# Patient Record
Sex: FEMALE | Race: WHITE | Employment: OTHER | URBAN - METROPOLITAN AREA
[De-identification: names, ages, dates, MRNs, and addresses within clinical notes are randomized per-mention and may not be internally consistent; named-entity substitution may affect disease eponyms.]

---

## 2017-01-14 ENCOUNTER — HOSPITAL ENCOUNTER (EMERGENCY)
Age: 67
Discharge: HOME | End: 2017-01-14
Attending: EMERGENCY MEDICINE
Payer: MEDICAID

## 2017-01-14 VITALS
HEART RATE: 75 BPM | DIASTOLIC BLOOD PRESSURE: 101 MMHG | BODY MASS INDEX: 25.91 KG/M2 | WEIGHT: 132 LBS | TEMPERATURE: 98.2 F | RESPIRATION RATE: 16 BRPM | OXYGEN SATURATION: 98 % | SYSTOLIC BLOOD PRESSURE: 200 MMHG | HEIGHT: 60 IN

## 2017-01-14 DIAGNOSIS — Z76.0 MEDICATION REFILL: Primary | ICD-10-CM

## 2017-01-14 PROCEDURE — 99282 EMERGENCY DEPT VISIT SF MDM: CPT

## 2017-01-14 RX ORDER — QUETIAPINE FUMARATE 300 MG/1
300 TABLET, FILM COATED ORAL 3 TIMES DAILY
COMMUNITY
End: 2017-01-14

## 2017-01-14 RX ORDER — VENLAFAXINE HYDROCHLORIDE 75 MG/1
1 TABLET, EXTENDED RELEASE ORAL DAILY
Qty: 30 TAB | Refills: 0 | Status: SHIPPED | OUTPATIENT
Start: 2017-01-14 | End: 2017-01-19 | Stop reason: ALTCHOICE

## 2017-01-14 RX ORDER — QUETIAPINE FUMARATE 100 MG/1
100 TABLET, FILM COATED ORAL 3 TIMES DAILY
COMMUNITY
End: 2017-01-14

## 2017-01-14 RX ORDER — METOPROLOL TARTRATE 50 MG/1
50 TABLET ORAL 2 TIMES DAILY
Qty: 60 TAB | Refills: 0 | Status: SHIPPED | OUTPATIENT
Start: 2017-01-14 | End: 2017-01-19 | Stop reason: SDUPTHER

## 2017-01-14 RX ORDER — QUETIAPINE FUMARATE 50 MG/1
TABLET, FILM COATED ORAL
Qty: 90 TAB | Refills: 0 | Status: SHIPPED | OUTPATIENT
Start: 2017-01-14 | End: 2017-01-19 | Stop reason: ALTCHOICE

## 2017-01-14 RX ORDER — QUETIAPINE FUMARATE 300 MG/1
300 TABLET, FILM COATED ORAL 2 TIMES DAILY
Qty: 60 TAB | Refills: 0 | Status: SHIPPED | OUTPATIENT
Start: 2017-01-14 | End: 2017-01-19 | Stop reason: DRUGHIGH

## 2017-01-14 NOTE — ED PROVIDER NOTES
HPI Comments: 61 y.o. female with past medical history significant for anxiety and depression who presents from home with chief complaint of medication refill. Patient admits she is here for a medication refill until February 7th, when she has an appointment with a new PCP provider. Patient reports she takes Seroquel 300 mg 3 times daily with another 100 mg, which is needed in order to sleep. Patient mentions taking Effexor and Metoprolol. Patient mentions she is attempting to quit alcohol. Patient denies fever, cough, nausea, vomiting, diarrhea, and constipation. There are no other acute medical concerns at this time. Old Chart Review: Per note, patient was here on 12/17/16 for atypical chest pain and was given 300 mg Seroquel XR. Patient was also here on 10/26/16 for hypertension and alcohol abuse and was given refills for Metoprolol, Seroquel XR, and Effexor. Social hx: Tobacco Use: Yes (1/2 pack a day), Alcohol Use: No, Drug Use: Yes (marijuana)    Note written by Harjeet Salinas, as dictated by Teodora Howard MD 1:34 PM                      The history is provided by the patient. Past Medical History:   Diagnosis Date    Anxiety     Hx of hepatitis C      pt states she no longer    Psychiatric disorder        Past Surgical History:   Procedure Laterality Date    Hx gastric bypass      Hx cholecystectomy      Hx other surgical       PEG tube         No family history on file. Social History     Social History    Marital status: SINGLE     Spouse name: N/A    Number of children: N/A    Years of education: N/A     Occupational History    Not on file.      Social History Main Topics    Smoking status: Heavy Tobacco Smoker     Packs/day: 0.50    Smokeless tobacco: Not on file    Alcohol use No      Comment: quit drinking one month prior    Drug use: Yes     Special: Marijuana    Sexual activity: Not on file     Other Topics Concern    Not on file     Social History Narrative ALLERGIES: Review of patient's allergies indicates no known allergies. Review of Systems   Constitutional: Negative for chills and fever. Respiratory: Negative for cough. Gastrointestinal: Negative for constipation, diarrhea, nausea and vomiting. All other systems reviewed and are negative. Vitals:    01/14/17 1315   BP: (!) 200/101   Pulse: 75   Resp: 16   Temp: 98.2 °F (36.8 °C)   SpO2: 98%   Weight: 59.9 kg (132 lb)   Height: 5' (1.524 m)            Physical Exam   Constitutional: She is oriented to person, place, and time. She appears well-developed and well-nourished. No distress. HENT:   Head: Normocephalic and atraumatic. Nose: Nose normal.   Mouth/Throat: Oropharynx is clear and moist.   Eyes: Conjunctivae and EOM are normal. Pupils are equal, round, and reactive to light. Right eye exhibits no discharge. Left eye exhibits no discharge. No scleral icterus. Neck: Normal range of motion. Neck supple. No JVD present. No tracheal deviation present. Cardiovascular: Normal rate, regular rhythm, normal heart sounds and intact distal pulses. Exam reveals no gallop and no friction rub. No murmur heard. Pulmonary/Chest: Effort normal and breath sounds normal. No respiratory distress. She has no wheezes. She has no rales. She exhibits no tenderness. Abdominal: Soft. Bowel sounds are normal. There is no tenderness. There is no rebound and no guarding. Genitourinary: No vaginal discharge found. Musculoskeletal: Normal range of motion. She exhibits no edema or tenderness. Neurological: She is alert and oriented to person, place, and time. No cranial nerve deficit. She exhibits normal muscle tone. Coordination normal.   Skin: Skin is warm and dry. No rash noted. No erythema. No pallor. Psychiatric: She has a normal mood and affect. Her behavior is normal. Thought content normal.   No SI/ HI   Nursing note and vitals reviewed.        MDM  ED Course       Procedures  Given Rx's;

## 2017-01-14 NOTE — ED NOTES
Pt asking for cab voucher but has Medicaid. Pt c/o that sometimes it takes 5 hours. Adv pt we can call [facility] [place]. Pt also has alternative who might come pick her up. Pt discharged with teaching given to pt by provider including not to drive while taking narcotics. Pt given opportunity to ask questions. Pt expressed understanding. Pt discharged to waiting room to wait for ride with no signs of distress.

## 2017-01-14 NOTE — DISCHARGE INSTRUCTIONS
Medication Refill: Care Instructions  Your Care Instructions  Medicines are a big part of treatment for many health problems. So it can be upsetting to run out of your medicine. It may even be dangerous to stop a medicine suddenly. The doctor may have given you enough medicine to help you until you can see your regular doctor. The doctor has checked you carefully, but problems can develop later. If you notice any problems or new symptoms, get medical treatment right away. Follow-up care is a key part of your treatment and safety. Be sure to make and go to all appointments, and call your doctor if you are having problems. It's also a good idea to know your test results and keep a list of the medicines you take. How can you care for yourself at home? · Be safe with medicines. Take your medicines exactly as prescribed. · Know when you will run out of your medicine. Use a calendar to remind you to get a refill. Don't wait until you have only a few pills left. · Talk with your doctor or pharmacist about your medicine. Find out what to do if you miss a dose. When should you call for help? Call your doctor now or seek immediate medical care if:  · You have problems with your medicine. Watch closely for changes in your health, and be sure to contact your doctor if you have any problems. Where can you learn more? Go to http://betsy-adair.info/. Enter K326 in the search box to learn more about \"Medication Refill: Care Instructions. \"  Current as of: August 14, 2016  Content Version: 11.1  © 5708-6925 [employer]. Care instructions adapted under license by [employer] (which disclaims liability or warranty for this information). If you have questions about a medical condition or this instruction, always ask your healthcare professional. [address] any warranty or liability for your use of this information.

## 2017-01-19 ENCOUNTER — TELEPHONE (OUTPATIENT)
Dept: FAMILY MEDICINE CLINIC | Age: 67
End: 2017-01-19

## 2017-01-19 ENCOUNTER — OFFICE VISIT (OUTPATIENT)
Dept: FAMILY MEDICINE CLINIC | Age: 67
End: 2017-01-19

## 2017-01-19 VITALS
SYSTOLIC BLOOD PRESSURE: 115 MMHG | RESPIRATION RATE: 20 BRPM | DIASTOLIC BLOOD PRESSURE: 69 MMHG | TEMPERATURE: 97.8 F | WEIGHT: 134 LBS | HEIGHT: 60 IN | HEART RATE: 66 BPM | BODY MASS INDEX: 26.31 KG/M2

## 2017-01-19 DIAGNOSIS — F32.A DEPRESSION, UNSPECIFIED DEPRESSION TYPE: ICD-10-CM

## 2017-01-19 DIAGNOSIS — F29 PSYCHOSIS, UNSPECIFIED PSYCHOSIS TYPE (HCC): ICD-10-CM

## 2017-01-19 DIAGNOSIS — Z98.890 HISTORY OF COLONOSCOPY: ICD-10-CM

## 2017-01-19 DIAGNOSIS — Z92.89 HISTORY OF MAMMOGRAM: ICD-10-CM

## 2017-01-19 DIAGNOSIS — Z92.89 HISTORY OF PAPANICOLAOU SMEAR OF CERVIX: ICD-10-CM

## 2017-01-19 DIAGNOSIS — K21.9 GASTROESOPHAGEAL REFLUX DISEASE WITHOUT ESOPHAGITIS: ICD-10-CM

## 2017-01-19 DIAGNOSIS — I10 ESSENTIAL HYPERTENSION: ICD-10-CM

## 2017-01-19 DIAGNOSIS — Z00.00 WELL ADULT EXAM: Primary | ICD-10-CM

## 2017-01-19 RX ORDER — METOPROLOL TARTRATE 50 MG/1
50 TABLET ORAL 2 TIMES DAILY
Qty: 60 TAB | Refills: 5 | Status: SHIPPED | OUTPATIENT
Start: 2017-01-19 | End: 2017-02-28 | Stop reason: SDUPTHER

## 2017-01-19 RX ORDER — ESOMEPRAZOLE MAGNESIUM 20 MG/1
1 TABLET, DELAYED RELEASE ORAL DAILY
Qty: 30 TAB | Refills: 5 | Status: SHIPPED | OUTPATIENT
Start: 2017-01-19 | End: 2017-06-24

## 2017-01-19 RX ORDER — VENLAFAXINE HYDROCHLORIDE 75 MG/1
1 CAPSULE, EXTENDED RELEASE ORAL DAILY
COMMUNITY
Start: 2016-12-18 | End: 2017-01-19 | Stop reason: ALTCHOICE

## 2017-01-19 RX ORDER — VENLAFAXINE HYDROCHLORIDE 75 MG/1
75 CAPSULE, EXTENDED RELEASE ORAL DAILY
Qty: 30 CAP | Refills: 1 | Status: SHIPPED | OUTPATIENT
Start: 2017-01-19 | End: 2017-03-13 | Stop reason: SDUPTHER

## 2017-01-19 RX ORDER — QUETIAPINE 300 MG/1
300 TABLET, FILM COATED, EXTENDED RELEASE ORAL
Qty: 30 TAB | Refills: 1 | Status: SHIPPED | OUTPATIENT
Start: 2017-01-19 | End: 2017-05-31

## 2017-01-19 RX ORDER — QUETIAPINE 300 MG/1
1 TABLET, FILM COATED, EXTENDED RELEASE ORAL 2 TIMES DAILY
COMMUNITY
Start: 2016-12-22 | End: 2017-01-19 | Stop reason: ALTCHOICE

## 2017-01-19 NOTE — PATIENT INSTRUCTIONS
Call 911 if you are concerned with visual or auditory hallucinations, or if you experience worsening depression, or have  feelings that you may be a danger to yourself or others. A Healthy Lifestyle: Care Instructions  Your Care Instructions  A healthy lifestyle can help you feel good, stay at a healthy weight, and have plenty of energy for both work and play. A healthy lifestyle is something you can share with your whole family. A healthy lifestyle also can lower your risk for serious health problems, such as high blood pressure, heart disease, and diabetes. You can follow a few steps listed below to improve your health and the health of your family. Follow-up care is a key part of your treatment and safety. Be sure to make and go to all appointments, and call your doctor if you are having problems. Its also a good idea to know your test results and keep a list of the medicines you take. How can you care for yourself at home? · Do not eat too much sugar, fat, or fast foods. You can still have dessert and treats now and then. The goal is moderation. · Start small to improve your eating habits. Pay attention to portion sizes, drink less juice and soda pop, and eat more fruits and vegetables. ¨ Eat a healthy amount of food. A 3-ounce serving of meat, for example, is about the size of a deck of cards. Fill the rest of your plate with vegetables and whole grains. ¨ Limit the amount of soda and sports drinks you have every day. Drink more water when you are thirsty. ¨ Eat at least 5 servings of fruits and vegetables every day. It may seem like a lot, but it is not hard to reach this goal. A serving or helping is 1 piece of fruit, 1 cup of vegetables, or 2 cups of leafy, raw vegetables. Have an apple or some carrot sticks as an afternoon snack instead of a candy bar. Try to have fruits and/or vegetables at every meal.  · Make exercise part of your daily routine.  You may want to start with simple activities, such as walking, bicycling, or slow swimming. Try to be active 30 to 60 minutes every day. You do not need to do all 30 to 60 minutes all at once. For example, you can exercise 3 times a day for 10 or 20 minutes. Moderate exercise is safe for most people, but it is always a good idea to talk to your doctor before starting an exercise program.  · Keep moving. [place] Leap the lawn, work in the garden, or Liquid. Take the stairs instead of the elevator at work. · If you smoke, quit. People who smoke have an increased risk for heart attack, stroke, cancer, and other lung illnesses. Quitting is hard, but there are ways to boost your chance of quitting tobacco for good. ¨ Use nicotine gum, patches, or lozenges. ¨ Ask your doctor about stop-smoking programs and medicines. ¨ Keep trying. In addition to reducing your risk of diseases in the future, you will notice some benefits soon after you stop using tobacco. If you have shortness of breath or asthma symptoms, they will likely get better within a few weeks after you quit. · Limit how much alcohol you drink. Moderate amounts of alcohol (up to 2 drinks a day for men, 1 drink a day for women) are okay. But drinking too much can lead to liver problems, high blood pressure, and other health problems. Family health  If you have a family, there are many things you can do together to improve your health. · Eat meals together as a family as often as possible. · Eat healthy foods. This includes fruits, vegetables, lean meats and dairy, and whole grains. · Include your family in your fitness plan. Most people think of activities such as jogging or tennis as the way to fitness, but there are many ways you and your family can be more active. Anything that makes you breathe hard and gets your heart pumping is exercise. Here are some tips:  ¨ Walk to do errands or to take your child to school or the bus.   ¨ Go for a family bike ride after dinner instead of watching TV. Where can you learn more? Go to http://betsy-adair.info/. Enter Q237 in the search box to learn more about \"A Healthy Lifestyle: Care Instructions. \"  Current as of: July 26, 2016  Content Version: 11.1  © 2280-6222 [employer]. Care instructions adapted under license by [employer] (which disclaims liability or warranty for this information). If you have questions about a medical condition or this instruction, always ask your healthcare professional. [address] any warranty or liability for your use of this information.

## 2017-01-19 NOTE — PROGRESS NOTES
Chief Complaint   Patient presents with   [address]     Patient is here to establish care, just moved here recently from [facility] because her son hung himself (his grandmother did the same) is now here taking care of her granddaughter because her daughter in-law in in [facility] for 90 days. She herself deals with psych issues and has been to the ER for these issues recently.

## 2017-01-19 NOTE — MR AVS SNAPSHOT
Visit Information Date & Time Provider Department Dept. Phone Encounter #  
 1/19/2017  1:00 PM Manuel [id number] Follow-up Instructions Return in about 6 months (around 7/19/2017) for HTN follow up. Your Appointments 2/6/2017 10:00 AM  
New Patient with Isabella Miller MD  
[address]) Appt Note: NP est PCP/meds, refills [address]  
[phone]  
  
   
 [address] [place] [address] [phone] Upcoming Health Maintenance Date Due Hepatitis C Screening 1950 Pneumococcal 19-64 Medium Risk (1 of 1 - PPSV23) 8/3/1971 DTaP/Tdap/Td series (1 - Tdap) 8/3/1973 PAP AKA CERVICAL CYTOLOGY 8/3/1973 BREAST CANCER SCRN MAMMOGRAM 8/3/2002 FOBT Q 1 YEAR AGE 50-75 8/3/2002 ZOSTER VACCINE AGE 60> 8/3/2012 INFLUENZA AGE 9 TO ADULT 8/1/2016 Allergies as of 1/19/2017  Review Complete On: 1/19/2017 By: Karissa Maldonado MD  
 No Known Allergies Current Immunizations  Reviewed on 9/15/2016 No immunizations on file. Not reviewed this visit You Were Diagnosed With   
  
 Codes Comments Well adult exam    -  Primary ICD-10-CM: Z00.00 ICD-9-CM: V70.0 Psychosis, unspecified psychosis type     ICD-10-CM: F29 
ICD-9-CM: 298.9 Depression, unspecified depression type     ICD-10-CM: F32.9 ICD-9-CM: 198 Essential hypertension     ICD-10-CM: I10 
ICD-9-CM: 401.9 Gastroesophageal reflux disease without esophagitis     ICD-10-CM: K21.9 ICD-9-CM: 530.81 History of mammogram     ICD-10-CM: Z92.89 ICD-9-CM: V15.89 History of Papanicolaou smear of cervix     ICD-10-CM: Z98.890 ICD-9-CM: V45.89 History of colonoscopy     ICD-10-CM: Z98.890 ICD-9-CM: V45.89 Vitals BP Pulse Temp Resp Height(growth percentile) Weight(growth percentile) 115/69 (BP 1 Location: Left arm, BP Patient Position: Sitting) 66 97.8 °F (36.6 °C) (Oral) 20 5' (1.524 m) 134 lb (60.8 kg) LMP BMI OB Status Smoking Status (Exact Date) 26.17 kg/m2 Menopause Heavy Tobacco Smoker BMI and BSA Data Body Mass Index Body Surface Area  
 26.17 kg/m 2 1.6 m 2 Preferred Pharmacy Pharmacy Name Phone [facility] [address] [place] [address] Drive, [address] [phone] Your Updated Medication List  
  
   
This list is accurate as of: 1/19/17  1:37 PM.  Always use your most recent med list.  
  
  
  
  
 esomeprazole magnesium 20 mg Tbec Commonly known as:  NexIUM 24HR Take 1 Tab by mouth daily. For heartburn  
  
 metoprolol tartrate 50 mg tablet Commonly known as:  LOPRESSOR Take 1 Tab by mouth two (2) times a day. For hypertension QUEtiapine  mg sr tablet Commonly known as:  SEROquel XR Take 1 Tab by mouth nightly. For sleep  
  
 venlafaxine-SR 75 mg capsule Commonly known as:  EFFEXOR-XR Take 1 Cap by mouth daily. In the morning, For depression Prescriptions Sent to Pharmacy Refills QUEtiapine SR (SEROQUEL XR) 300 mg sr tablet 1 Sig: Take 1 Tab by mouth nightly. For sleep Class: Normal  
 Pharmacy: [address], [facility] [facility] [address] Ph #: [phone] Route: Oral  
 venlafaxine-SR (EFFEXOR-XR) 75 mg capsule 1 Sig: Take 1 Cap by mouth daily. In the morning, For depression Class: Normal  
 Pharmacy: [address], [address] [place] [address] Rd. [address] [facility] [address] Ph #: [phone] Route: Oral  
 metoprolol tartrate (LOPRESSOR) 50 mg tablet 5 Sig: Take 1 Tab by mouth two (2) times a day. For hypertension Class: Normal  
 Pharmacy: [address], [facility] [facility] [address] [facility] [address] Ph #: [phone]  Route: Oral  
 esomeprazole magnesium (NEXIUM 24HR) 20 mg TbEC 5  
 Sig: Take 1 Tab by mouth daily. For heartburn Class: Normal  
 Pharmacy: [address], [address]  #: [phone] Route: Oral  
  
We Performed the Following REFERRAL TO PSYCHIATRY [REF91 Custom] Comments:  
 Please evaluate patient for history of psychosis, no local psych care. Follow-up Instructions Return in about 6 months (around 7/19/2017) for HTN follow up. Referral Information Referral ID Referred By Referred To  
  
 [id number] Val Sever, MD   
   [address] [place] [address], [address] Phone: [phone] Fax: [phone] Visits Status Start Date End Date 1 New Request 1/19/17 1/19/18 If your referral has a status of pending review or denied, additional information will be sent to support the outcome of this decision. Patient Instructions Call 911 if you are concerned with visual or auditory hallucinations, or if you experience worsening depression, or have  feelings that you may be a danger to yourself or others. A Healthy Lifestyle: Care Instructions Your Care Instructions A healthy lifestyle can help you feel good, stay at a healthy weight, and have plenty of energy for both work and play. A healthy lifestyle is something you can share with your whole family. A healthy lifestyle also can lower your risk for serious health problems, such as high blood pressure, heart disease, and diabetes. You can follow a few steps listed below to improve your health and the health of your family. Follow-up care is a key part of your treatment and safety. Be sure to make and go to all appointments, and call your doctor if you are having problems. Its also a good idea to know your test results and keep a list of the medicines you take. How can you care for yourself at home? · Do not eat too much sugar, fat, or fast foods.  You can still have dessert and treats now and then. The goal is moderation. · Start small to improve your eating habits. Pay attention to portion sizes, drink less juice and soda pop, and eat more fruits and vegetables. ¨ Eat a healthy amount of food. A 3-ounce serving of meat, for example, is about the size of a deck of cards. Fill the rest of your plate with vegetables and whole grains. ¨ Limit the amount of soda and sports drinks you have every day. Drink more water when you are thirsty. ¨ Eat at least 5 servings of fruits and vegetables every day. It may seem like a lot, but it is not hard to reach this goal. A serving or helping is 1 piece of fruit, 1 cup of vegetables, or 2 cups of leafy, raw vegetables. Have an apple or some carrot sticks as an afternoon snack instead of a candy bar. Try to have fruits and/or vegetables at every meal. 
· Make exercise part of your daily routine. You may want to start with simple activities, such as walking, bicycling, or slow swimming. Try to be active 30 to 60 minutes every day. You do not need to do all 30 to 60 minutes all at once. For example, you can exercise 3 times a day for 10 or 20 minutes. Moderate exercise is safe for most people, but it is always a good idea to talk to your doctor before starting an exercise program. 
· Keep moving. [place] the lawn, work in the garden, or [place] Ride. Take the stairs instead of the elevator at work. · If you smoke, quit. People who smoke have an increased risk for heart attack, stroke, cancer, and other lung illnesses. Quitting is hard, but there are ways to boost your chance of quitting tobacco for good. ¨ Use nicotine gum, patches, or lozenges. ¨ Ask your doctor about stop-smoking programs and medicines. ¨ Keep trying.  
In addition to reducing your risk of diseases in the future, you will notice some benefits soon after you stop using tobacco. If you have shortness of breath or asthma symptoms, they will likely get better within a few weeks after you quit. · Limit how much alcohol you drink. Moderate amounts of alcohol (up to 2 drinks a day for men, 1 drink a day for women) are okay. But drinking too much can lead to liver problems, high blood pressure, and other health problems. Family health If you have a family, there are many things you can do together to improve your health. · Eat meals together as a family as often as possible. · Eat healthy foods. This includes fruits, vegetables, lean meats and dairy, and whole grains. · Include your family in your fitness plan. Most people think of activities such as jogging or tennis as the way to fitness, but there are many ways you and your family can be more active. Anything that makes you breathe hard and gets your heart pumping is exercise. Here are some tips: 
¨ Walk to do errands or to take your child to school or the bus. ¨ Go for a family bike ride after dinner instead of watching TV. Where can you learn more? Go to http://betsy-adair.info/. Enter P481 in the search box to learn more about \"A Healthy Lifestyle: Care Instructions. \" Current as of: July 26, 2016 Content Version: 11.1 © 9335-6096 [employer] [place]. Care instructions adapted under license by [employer] (which disclaims liability or warranty for this information). If you have questions about a medical condition or this instruction, always ask your healthcare professional. [place] [address] any warranty or liability for your use of this information. Introducing [facility]! Dena Broderick introduces [facility] [facility] patient portal. Now you can access parts of your medical record, email your doctor's office, and request medication refills online. 1. In your internet browser, go to [email or web address] 2. Click on the First Time User? Click Here link in the Sign In box. You will see the New Member Sign Up page. 3. Enter your meets Access Code exactly as it appears below. You will not need to use this code after youve completed the sign-up process. If you do not sign up before the expiration date, you must request a new code. · meets Access Code: [email or web address] Expires: 3/17/2017 10:44 AM 
 
4. Enter the last four digits of your Social Security Number (xxxx) and Date of Birth (mm/dd/yyyy) as indicated and click Submit. You will be taken to the next sign-up page. 5. Create a meets ID. This will be your meets login ID and cannot be changed, so think of one that is secure and easy to remember. 6. Create a meets password. You can change your password at any time. 7. Enter your Password Reset Question and Answer. This can be used at a later time if you forget your password. 8. Enter your e-mail address. You will receive e-mail notification when new information is available in [address] 9. Click Sign Up. You can now view and download portions of your medical record. 10. Click the Download Summary menu link to download a portable copy of your medical information. If you have questions, please visit the Frequently Asked Questions section of the meets website. Remember, meets is NOT to be used for urgent needs. For medical emergencies, dial 911. Now available from your iPhone and Android! Please provide this summary of care documentation to your next provider. Your primary care clinician is listed as PROVIDER UNKNOWN. If you have any questions after today's visit, please call [phone].

## 2017-01-19 NOTE — PROGRESS NOTES
Subjective:   61 y.o. female for Well Woman Check. Her gyne and breast care is done elsewhere by her Ob-Gyne physician. Last pap 2 yrs ago, always normal.  Last mammogram 2016. Normal  Last colonoscopy 2016, normal.     Has referral from Dr Di Rojas for breast reduction. Has history psych care, with hx of ER visits for psychosis per pt. Needs psych referral  Last visit was 9/2016 psych admission per pt  The patient denies any thoughts of danger to self or others, and denies auditory or visual hallucinations. Patient Active Problem List   Diagnosis Code    Perforated ulcer ([address] K27.5    Malnutrition ([address] E46    Alcohol abuse F10.10    Protein-calorie malnutrition, severe ([address] E43    Peritonitis ([address] K65.9    Essential hypertension I10    SIRS (systemic inflammatory response syndrome) (HCC) R65.10    History of mammogram Z92.89    History of Papanicolaou smear of cervix Z98.890          ROS: Feeling generally well. No TIA's or unusual headaches, no dysphagia. No prolonged cough. No dyspnea or chest pain on exertion. No abdominal pain, change in bowel habits, black or bloody stools. No urinary tract symptoms. No new or unusual musculoskeletal symptoms. Specific concerns today: as above. Objective: The patient appears well, alert, oriented x 3, in no distress. Visit Vitals    /69 (BP 1 Location: Left arm, BP Patient Position: Sitting)    Pulse 66    Temp 97.8 °F (36.6 °C) (Oral)    Resp 20    Ht 5' (1.524 m)    Wt 134 lb (60.8 kg)    LMP  (Exact Date)    BMI 26.17 kg/m2     ENT normal.  Neck supple. No adenopathy or thyromegaly. SUNSHINE. Lungs are clear, good air entry, no wheezes, rhonchi or rales. S1 and S2 normal, no murmurs, regular rate and rhythm. Abdomen soft without tenderness, guarding, mass or organomegaly. Extremities show no edema, normal peripheral pulses. Neurological is normal, no focal findings.   Breast and Pelvic exams are deferred. Assessment/Plan:   Well Woman to establish care. 1. Psychosis, unspecified psychosis type     - REFERRAL TO PSYCHIATRY  - QUEtiapine SR (SEROQUEL XR) 300 mg sr tablet; Take 1 Tab by mouth nightly. For sleep  Dispense: 30 Tab; Refill: 1  - venlafaxine-SR (EFFEXOR-XR) 75 mg capsule; Take 1 Cap by mouth daily. In the morning, For depression  Dispense: 30 Cap; Refill: 1    2. Depression, unspecified depression type     - REFERRAL TO PSYCHIATRY  - QUEtiapine SR (SEROQUEL XR) 300 mg sr tablet; Take 1 Tab by mouth nightly. For sleep  Dispense: 30 Tab; Refill: 1  - venlafaxine-SR (EFFEXOR-XR) 75 mg capsule; Take 1 Cap by mouth daily. In the morning, For depression  Dispense: 30 Cap; Refill: 1    3. Well adult exam       4. Essential hypertension     - metoprolol tartrate (LOPRESSOR) 50 mg tablet; Take 1 Tab by mouth two (2) times a day. For hypertension  Dispense: 60 Tab; Refill: 5    5. Gastroesophageal reflux disease without esophagitis     - esomeprazole magnesium (NEXIUM 24HR) 20 mg TbEC; Take 1 Tab by mouth daily. For heartburn  Dispense: 30 Tab; Refill: 5    6. History of mammogram     Normal 2016    7. History of Papanicolaou smear of cervix   normal 2016    8. History of colonoscopy   Normal 2016 per pt    continue present plan, call if any problems  Follow-up Disposition:  Return in about 6 months (around 7/19/2017) for HTN follow up. I  have discussed the diagnosis with the patient and the intended treatment plan as seen in the above orders. Patient has provided input and agrees with goals. The patient has received an after-visit summary and questions were answered concerning future plans. I have discussed medication side effects and warnings with the patient as well.

## 2017-01-20 ENCOUNTER — DOCUMENTATION ONLY (OUTPATIENT)
Dept: FAMILY MEDICINE CLINIC | Age: 67
End: 2017-01-20

## 2017-01-23 ENCOUNTER — TELEPHONE (OUTPATIENT)
Dept: FAMILY MEDICINE CLINIC | Age: 67
End: 2017-01-23

## 2017-01-23 NOTE — TELEPHONE ENCOUNTER
Called patient's insurance company. No authorization required for service. Faxed referral to [facility] and noted this information.

## 2017-01-26 ENCOUNTER — TELEPHONE (OUTPATIENT)
Dept: FAMILY MEDICINE CLINIC | Age: 67
End: 2017-01-26

## 2017-01-26 NOTE — TELEPHONE ENCOUNTER
Pt called stating she's been trying to reach Dr. Ambrocio Galloway nurse regarding prior auth needed for extending release Seroquel she takes twice daily, noting she gets severe cramps if she doesn't take the extended release prescription. Pt also states [facility] has faxed this 3 times.

## 2017-02-03 ENCOUNTER — TELEPHONE (OUTPATIENT)
Dept: FAMILY MEDICINE CLINIC | Age: 67
End: 2017-02-03

## 2017-02-03 PROBLEM — R41.89 IMPAIRED COGNITION: Status: ACTIVE | Noted: 2017-02-03

## 2017-02-03 PROBLEM — F41.9 ANXIETY: Status: ACTIVE | Noted: 2017-02-03

## 2017-02-03 PROBLEM — F31.9 BIPOLAR DISORDER (HCC): Status: ACTIVE | Noted: 2017-02-03

## 2017-02-03 NOTE — TELEPHONE ENCOUNTER
Have tried to call Rafita Santamariarosas several times but her number is no longer valid. Received a need for clarification fax from [phone] concerning Seroquel XR. Called and spoke with them, they stated that the medication is not covered (600 dollars) and that I would have to call [facility]. Called [facility] and they advised me to call [facility] to get a Prior Authorization ([phone]). Called them and they stated that they would be faxing me a form to fill out and fax back to them. They stated that the form that they would be sending me should be used for all future antipsychotic drug prior authorizations. Received the form and it is a form for prior authorization for antipsychotics in children [de-identified]17 years of age. Called them back and they have faxed the correct form. It will be filled out and sent today.

## 2017-02-28 ENCOUNTER — TELEPHONE (OUTPATIENT)
Dept: FAMILY MEDICINE CLINIC | Age: 67
End: 2017-02-28

## 2017-02-28 DIAGNOSIS — I10 ESSENTIAL HYPERTENSION: ICD-10-CM

## 2017-03-02 RX ORDER — METOPROLOL TARTRATE 50 MG/1
50 TABLET ORAL 2 TIMES DAILY
Qty: 60 TAB | Refills: 1 | Status: SHIPPED | OUTPATIENT
Start: 2017-03-02 | End: 2017-06-08 | Stop reason: SDUPTHER

## 2017-03-13 DIAGNOSIS — F32.A DEPRESSION, UNSPECIFIED DEPRESSION TYPE: ICD-10-CM

## 2017-03-13 DIAGNOSIS — F29 PSYCHOSIS, UNSPECIFIED PSYCHOSIS TYPE (HCC): ICD-10-CM

## 2017-03-13 RX ORDER — VENLAFAXINE HYDROCHLORIDE 75 MG/1
75 CAPSULE, EXTENDED RELEASE ORAL DAILY
Qty: 30 CAP | Refills: 0 | Status: SHIPPED | OUTPATIENT
Start: 2017-03-13 | End: 2017-05-16 | Stop reason: SDUPTHER

## 2017-03-13 NOTE — TELEPHONE ENCOUNTER
Pt out of her medication and states she can't get in with psychiatrist until the end of the yessi, so needs refill sent to [facility] today please.

## 2017-04-19 ENCOUNTER — TELEPHONE (OUTPATIENT)
Dept: SURGERY | Age: 67
End: 2017-04-19

## 2017-04-19 NOTE — TELEPHONE ENCOUNTER
Patient wants to discuss before her apt. What is going on abdominal discomfort. Patient had to re/henri. Due to her ins. And needs transportation.

## 2017-05-16 ENCOUNTER — OFFICE VISIT (OUTPATIENT)
Dept: BEHAVIORAL/MENTAL HEALTH CLINIC | Age: 67
End: 2017-05-16

## 2017-05-16 VITALS
WEIGHT: 131 LBS | DIASTOLIC BLOOD PRESSURE: 74 MMHG | HEIGHT: 60 IN | HEART RATE: 63 BPM | BODY MASS INDEX: 25.72 KG/M2 | OXYGEN SATURATION: 98 % | SYSTOLIC BLOOD PRESSURE: 110 MMHG

## 2017-05-16 DIAGNOSIS — F43.10 PTSD (POST-TRAUMATIC STRESS DISORDER): Primary | ICD-10-CM

## 2017-05-16 DIAGNOSIS — F32.A DEPRESSION, UNSPECIFIED DEPRESSION TYPE: ICD-10-CM

## 2017-05-16 DIAGNOSIS — F10.10 ALCOHOL ABUSE: ICD-10-CM

## 2017-05-16 DIAGNOSIS — F12.10 CANNABIS ABUSE: ICD-10-CM

## 2017-05-16 RX ORDER — QUETIAPINE 400 MG/1
400 TABLET, FILM COATED, EXTENDED RELEASE ORAL
Qty: 30 TAB | Refills: 1 | Status: SHIPPED | OUTPATIENT
Start: 2017-05-16 | End: 2017-08-01 | Stop reason: DRUGHIGH

## 2017-05-16 RX ORDER — VENLAFAXINE HYDROCHLORIDE 150 MG/1
CAPSULE, EXTENDED RELEASE ORAL
Qty: 30 CAP | Refills: 1 | Status: SHIPPED | OUTPATIENT
Start: 2017-05-16 | End: 2017-07-31 | Stop reason: SDUPTHER
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
## 2017-05-23 ENCOUNTER — TELEPHONE (OUTPATIENT)
Dept: SURGERY | Age: 67
End: 2017-05-23

## 2017-05-31 ENCOUNTER — OFFICE VISIT (OUTPATIENT)
Dept: BEHAVIORAL/MENTAL HEALTH CLINIC | Age: 67
End: 2017-05-31

## 2017-05-31 VITALS
OXYGEN SATURATION: 98 % | WEIGHT: 127 LBS | HEART RATE: 65 BPM | DIASTOLIC BLOOD PRESSURE: 87 MMHG | SYSTOLIC BLOOD PRESSURE: 154 MMHG | HEIGHT: 60 IN | BODY MASS INDEX: 24.94 KG/M2

## 2017-05-31 DIAGNOSIS — F12.10 CANNABIS ABUSE: ICD-10-CM

## 2017-05-31 DIAGNOSIS — F10.10 ALCOHOL ABUSE: ICD-10-CM

## 2017-05-31 DIAGNOSIS — F43.10 PTSD (POST-TRAUMATIC STRESS DISORDER): Primary | ICD-10-CM

## 2017-05-31 RX ORDER — SERTRALINE HYDROCHLORIDE 50 MG/1
TABLET, FILM COATED ORAL
Qty: 30 TAB | Refills: 0 | Status: SHIPPED | OUTPATIENT
Start: 2017-05-31 | End: 2017-06-24
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
## 2017-06-08 DIAGNOSIS — I10 ESSENTIAL HYPERTENSION: ICD-10-CM

## 2017-06-08 RX ORDER — METOPROLOL TARTRATE 50 MG/1
50 TABLET ORAL 2 TIMES DAILY
Qty: 60 TAB | Refills: 0 | Status: SHIPPED | OUTPATIENT
Start: 2017-06-08 | End: 2017-06-26 | Stop reason: SDUPTHER

## 2017-06-13 ENCOUNTER — TELEPHONE (OUTPATIENT)
Dept: BEHAVIORAL/MENTAL HEALTH CLINIC | Age: 67
End: 2017-06-13

## 2017-06-20 ENCOUNTER — TELEPHONE (OUTPATIENT)
Dept: BEHAVIORAL/MENTAL HEALTH CLINIC | Age: 67
End: 2017-06-20

## 2017-06-20 NOTE — TELEPHONE ENCOUNTER
Pt called back returning your call from awhile ago I believe. Please call pt back when you have a moment.

## 2017-06-21 ENCOUNTER — TELEPHONE (OUTPATIENT)
Dept: BEHAVIORAL/MENTAL HEALTH CLINIC | Age: 67
End: 2017-06-21

## 2017-06-21 NOTE — TELEPHONE ENCOUNTER
She went to [facility] and was kept overnight for GI upset. She was put on acid reflux and ulcer medication. She is feeling better and says that she has to stop drinking alcohol. She is looking into treatment programs and Is planning on attending an [place] [id number] meeting this Saturday evening in her area. She is no longer taking Zoloft. She was reminded of her med management appt on 6/30.

## 2017-06-21 NOTE — TELEPHONE ENCOUNTER
Pt called this morning and wanted to let you know that she is currently in the hospital for her bypass surgery and that she is only taking the seroquel; she says that zoloft makes her do crazy things. She would like for you to give her a call back when you have a moment.

## 2017-06-24 ENCOUNTER — HOSPITAL ENCOUNTER (EMERGENCY)
Age: 67
Discharge: HOME | End: 2017-06-24
Attending: EMERGENCY MEDICINE
Payer: MEDICAID

## 2017-06-24 ENCOUNTER — APPOINTMENT (OUTPATIENT)
Dept: GENERAL RADIOLOGY | Age: 67
End: 2017-06-24
Attending: EMERGENCY MEDICINE
Payer: MEDICAID

## 2017-06-24 VITALS
OXYGEN SATURATION: 100 % | TEMPERATURE: 98.5 F | SYSTOLIC BLOOD PRESSURE: 148 MMHG | DIASTOLIC BLOOD PRESSURE: 89 MMHG | HEIGHT: 60 IN | BODY MASS INDEX: 24.94 KG/M2 | WEIGHT: 127 LBS | HEART RATE: 71 BPM | RESPIRATION RATE: 16 BRPM

## 2017-06-24 DIAGNOSIS — R10.13 ABDOMINAL PAIN, EPIGASTRIC: Primary | ICD-10-CM

## 2017-06-24 LAB
ALBUMIN SERPL BCP-MCNC: 3.4 G/DL (ref 3.5–5)
ALBUMIN/GLOB SERPL: 0.9 {RATIO} (ref 1.1–2.2)
ALP SERPL-CCNC: 79 U/L (ref 45–117)
ALT SERPL-CCNC: 19 U/L (ref 12–78)
ANION GAP BLD CALC-SCNC: 8 MMOL/L (ref 5–15)
APPEARANCE UR: ABNORMAL
AST SERPL W P-5'-P-CCNC: 22 U/L (ref 15–37)
BASOPHILS # BLD AUTO: 0 K/UL (ref 0–0.1)
BASOPHILS # BLD: 0 % (ref 0–1)
BILIRUB SERPL-MCNC: 0.2 MG/DL (ref 0.2–1)
BILIRUB UR QL: NEGATIVE
BUN SERPL-MCNC: 18 MG/DL (ref 6–20)
BUN/CREAT SERPL: 21 (ref 12–20)
CALCIUM SERPL-MCNC: 9 MG/DL (ref 8.5–10.1)
CHLORIDE SERPL-SCNC: 104 MMOL/L (ref 97–108)
CO2 SERPL-SCNC: 29 MMOL/L (ref 21–32)
COLOR UR: ABNORMAL
CREAT SERPL-MCNC: 0.85 MG/DL (ref 0.55–1.02)
EOSINOPHIL # BLD: 0.1 K/UL (ref 0–0.4)
EOSINOPHIL NFR BLD: 3 % (ref 0–7)
ERYTHROCYTE [DISTWIDTH] IN BLOOD BY AUTOMATED COUNT: 16.3 % (ref 11.5–14.5)
GLOBULIN SER CALC-MCNC: 4 G/DL (ref 2–4)
GLUCOSE SERPL-MCNC: 93 MG/DL (ref 65–100)
GLUCOSE UR STRIP.AUTO-MCNC: NEGATIVE MG/DL
HCT VFR BLD AUTO: 32.5 % (ref 35–47)
HGB BLD-MCNC: 10.4 G/DL (ref 11.5–16)
HGB UR QL STRIP: NEGATIVE
KETONES UR QL STRIP.AUTO: NEGATIVE MG/DL
LEUKOCYTE ESTERASE UR QL STRIP.AUTO: NEGATIVE
LIPASE SERPL-CCNC: 167 U/L (ref 73–393)
LYMPHOCYTES # BLD AUTO: 38 % (ref 12–49)
LYMPHOCYTES # BLD: 2.2 K/UL (ref 0.8–3.5)
MCH RBC QN AUTO: 26.4 PG (ref 26–34)
MCHC RBC AUTO-ENTMCNC: 32 G/DL (ref 30–36.5)
MCV RBC AUTO: 82.5 FL (ref 80–99)
MONOCYTES # BLD: 0.5 K/UL (ref 0–1)
MONOCYTES NFR BLD AUTO: 9 % (ref 5–13)
NEUTS SEG # BLD: 2.8 K/UL (ref 1.8–8)
NEUTS SEG NFR BLD AUTO: 50 % (ref 32–75)
NITRITE UR QL STRIP.AUTO: NEGATIVE
PH UR STRIP: 6.5 [PH] (ref 5–8)
PLATELET # BLD AUTO: 302 K/UL (ref 150–400)
POTASSIUM SERPL-SCNC: 3.4 MMOL/L (ref 3.5–5.1)
PROT SERPL-MCNC: 7.4 G/DL (ref 6.4–8.2)
PROT UR STRIP-MCNC: NEGATIVE MG/DL
RBC # BLD AUTO: 3.94 M/UL (ref 3.8–5.2)
SODIUM SERPL-SCNC: 141 MMOL/L (ref 136–145)
SP GR UR REFRACTOMETRY: 1.01 (ref 1–1.03)
UROBILINOGEN UR QL STRIP.AUTO: 0.2 EU/DL (ref 0.2–1)
WBC # BLD AUTO: 5.6 K/UL (ref 3.6–11)

## 2017-06-24 PROCEDURE — 74020 XR ABD FLAT/ ERECT: CPT

## 2017-06-24 PROCEDURE — 83690 ASSAY OF LIPASE: CPT | Performed by: EMERGENCY MEDICINE

## 2017-06-24 PROCEDURE — 36415 COLL VENOUS BLD VENIPUNCTURE: CPT | Performed by: EMERGENCY MEDICINE

## 2017-06-24 PROCEDURE — 99284 EMERGENCY DEPT VISIT MOD MDM: CPT

## 2017-06-24 PROCEDURE — 74011250636 HC RX REV CODE- 250/636: Performed by: EMERGENCY MEDICINE

## 2017-06-24 PROCEDURE — 85025 COMPLETE CBC W/AUTO DIFF WBC: CPT | Performed by: EMERGENCY MEDICINE

## 2017-06-24 PROCEDURE — 80053 COMPREHEN METABOLIC PANEL: CPT | Performed by: EMERGENCY MEDICINE

## 2017-06-24 PROCEDURE — 96361 HYDRATE IV INFUSION ADD-ON: CPT

## 2017-06-24 PROCEDURE — 81003 URINALYSIS AUTO W/O SCOPE: CPT | Performed by: EMERGENCY MEDICINE

## 2017-06-24 PROCEDURE — 74011250637 HC RX REV CODE- 250/637: Performed by: EMERGENCY MEDICINE

## 2017-06-24 PROCEDURE — 96360 HYDRATION IV INFUSION INIT: CPT

## 2017-06-24 RX ORDER — SUCRALFATE 1 G/1
1 TABLET ORAL 3 TIMES DAILY
COMMUNITY
End: 2017-08-22 | Stop reason: SDUPTHER

## 2017-06-24 RX ORDER — ESOMEPRAZOLE MAGNESIUM 20 MG/1
40 TABLET, DELAYED RELEASE ORAL DAILY
Qty: 30 TAB | Refills: 0 | Status: SHIPPED | OUTPATIENT
Start: 2017-06-24 | End: 2017-08-22 | Stop reason: ALTCHOICE

## 2017-06-24 RX ORDER — DICYCLOMINE HYDROCHLORIDE 10 MG/1
10 CAPSULE ORAL
Status: COMPLETED | OUTPATIENT
Start: 2017-06-24 | End: 2017-06-24

## 2017-06-24 RX ADMIN — SODIUM CHLORIDE 1000 ML: 900 INJECTION, SOLUTION INTRAVENOUS at 15:11

## 2017-06-24 RX ADMIN — DICYCLOMINE HYDROCHLORIDE 10 MG: 10 CAPSULE ORAL at 15:50

## 2017-06-24 NOTE — ED NOTES
Patient given discharge instruction by Lilian Mountain. Verbalized understanding, pt discharge home.

## 2017-06-24 NOTE — ED TRIAGE NOTES
Pt present via EMS for abdominal pain that radiates to her back x3 months. Seen at [facility] on Monday.  Pt reports she had a repaired perforated ulcer in September of 2016 following gastric bypass surgery done 7 years ago

## 2017-06-24 NOTE — ED PROVIDER NOTES
HPI Comments: 61 y.o. female with past medical history significant for anxiety, hepatitis C, psychiatric disorder, blurred vision, hemorrhoids, stress, anxiety, depression, gastric bypass, cholecystectomy, and PEG tube who presents from home with chief complaint of abd pain. Pt c/o of intermittent abd pain that feels like pressure and cramping that began 3 months ago with her current episode starting 1.5 hours ago. Pt states she was admitted to [facility] 5 days ago for one night after 2 CT scans revealed \"something with her bowel\" and was discharged 4 days ago with the understanding with the physician there that she'd be under better care at [address] [place] [address] with her surgeon, Dr. Linda Maciel. Pt claims she's talked to Dr. Linda Maciel and has an appointment with him 5 days from now. Pt reports she ate this morning, and had normal BM this morning as well. Pt was d/c with Carafate from [facility] - she is taking this and has it with her. Pt claims she's tried zantac without much help and took 4 motrin last night for her pain. Pt denies any fever, bloody stool, black stool, vomiting, HI, SI, or dysuria. Pt was operated on by Dr. Linda Maciel for a perforated ulcer and claims it's tough to interpret her body since then. Pt reports that her family is non-supportive. There are no other acute medical concerns at this time. Pain 4/10. \"sore\"    Social hx: No ETOH in 10 months. Occasional tobacco use. No drug use  PCP: PROVIDER UNKNOWN  Surgery:  Laron Gonzalez MD    Chart review: 1/14/17 for medication refill on 9/8/16 had abd CT showed free intraperitoneal air was admitted until 9/21/16 for peritonitis and perforated ulcer complicated by delirium tremens and psychiatric admission    Note written by Harjeet Meade, as dictated by Charito Childs DO 2:39 PM       The history is provided by the patient. No  was used.         Past Medical History:   Diagnosis Date    Anxiety     Anxiety     Blurred vision     Depression     Hemorrhoids     Hx of hepatitis C     pt states she no longer    Psychiatric disorder     Stress        Past Surgical History:   Procedure Laterality Date    HX CHOLECYSTECTOMY      HX GASTRIC BYPASS      HX OTHER SURGICAL      PEG tube         No family history on file. Social History     Social History    Marital status: SINGLE     Spouse name: N/A    Number of children: N/A    Years of education: N/A     Occupational History    Not on file. Social History Main Topics    Smoking status: Current Every Day Smoker     Packs/day: 0.25    Smokeless tobacco: Not on file    Alcohol use No      Comment: quit drinking one month prior    Drug use: No    Sexual activity: Not on file     Other Topics Concern    Not on file     Social History Narrative         ALLERGIES: Review of patient's allergies indicates no known allergies. Review of Systems   Constitutional: Negative for fever. Gastrointestinal: Positive for abdominal pain (cramping/pressure). Negative for blood in stool and vomiting. Genitourinary: Negative for dysuria. Psychiatric/Behavioral: Negative for suicidal ideas. All other systems reviewed and are negative. There were no vitals filed for this visit. Physical Exam   Nursing note and vitals reviewed. Constitutional: Pt is awake and alert. Pt appears well-developed and well-nourished. NAD. HENT:   Head: Normocephalic and atraumatic. Nose: Nose normal.   Mouth/Throat: Oropharynx is clear and moist. No oropharyngeal exudate. Eyes: Conjunctivae and extraocular motions are normal. Pupils are equal, round, and reactive to light. Right eye exhibits no discharge. Left eye exhibits no discharge. No scleral icterus. Neck: No tracheal deviation present. Supple neck. Cardiovascular: Normal rate, regular rhythm, normal heart sounds and intact distal pulses. Exam reveals no gallop and no friction rub. No murmur heard.   Pulmonary/Chest: Effort normal and breath sounds normal.  Pt  has no wheezes. Pt  has no rales. Abdominal: Soft. Pt  exhibits no distension and no mass. Seems to be tender everywhere. Pt  has no rebound and no guarding. Musculoskeletal:  Pt  exhibits no edema and no tenderness. Ext: Normal ROM in all four extremities; not tender to palpation; distal pulses are normal, no edema. Neurological:  Pt is alert. nonfocal neuro exam.  Skin: Skin is warm and dry. Pt  is not diaphoretic. Psychiatric:  Pt  has a normal mood and affect. Behavior is normal.   Note written by Harjeet Mike, as dictated by Jeremy Kraus DO 2:41 PM               [facility]  ED Course       Procedures    PROGRESS NOTE:  3:26 PM  Reviewing the patient's x-rays      Labs Reviewed   CBC WITH AUTOMATED DIFF - Abnormal; Notable for the following:        Result Value    HGB 10.4 (*)     HCT 32.5 (*)     RDW 16.3 (*)     All other components within normal limits   METABOLIC PANEL, COMPREHENSIVE - Abnormal; Notable for the following:     Potassium 3.4 (*)     BUN/Creatinine ratio 21 (*)     Albumin 3.4 (*)     A-G Ratio 0.9 (*)     All other components within normal limits   URINALYSIS W/ RFLX MICROSCOPIC - Abnormal; Notable for the following:     Appearance CLOUDY (*)     All other components within normal limits   LIPASE   SAMPLES BEING HELD     Soft abd  Had 2 CTs this past Monday - awaiting the records to be faxed from [facility] - don't feel she needs another CT today. Looks quite comfortable here. 4:20 PM - discussed with [facility] resident, Dr Kaila Nunez - he will contact  to help follow this pt up. She has had at least 2 ED visits for abd pain in the past week. Referring to [facility] as well though maybe the pt needs EGD. Continue carafate. Will add bentyl prn.          5:32 PM - d/w Dr Magali Yñaez - will scope as outpt or have his partner do it. Pt does not need emergent endo today or tomorrow.   Pt will also have difficulty getting a ride to her appts and procedures. We decided on adding a PPI to her carafate today. She looks comfortable. Reading her bible. Doesn't look to be in pain. He thinks she has an ulcer. nonsurgical abd exam - doubt perforation today.     Return if worse    PROGRESS NOTE:  5:35 PM  Pt already has GI and general surgery appointments scheduled    Labs Reviewed   CBC WITH AUTOMATED DIFF - Abnormal; Notable for the following:        Result Value    HGB 10.4 (*)     HCT 32.5 (*)     RDW 16.3 (*)     All other components within normal limits   METABOLIC PANEL, COMPREHENSIVE - Abnormal; Notable for the following:     Potassium 3.4 (*)     BUN/Creatinine ratio 21 (*)     Albumin 3.4 (*)     A-G Ratio 0.9 (*)     All other components within normal limits   URINALYSIS W/ RFLX MICROSCOPIC - Abnormal; Notable for the following:     Appearance CLOUDY (*)     All other components within normal limits   LIPASE   SAMPLES BEING HELD

## 2017-06-24 NOTE — DISCHARGE INSTRUCTIONS
We hope that we have addressed all of your medical concerns. The examination and treatment you received in the Emergency Department were for an emergent problem and were not intended as complete care. It is important that you follow up with your healthcare provider(s) for ongoing care. If your symptoms worsen or do not improve as expected, and you are unable to reach your usual health care provider(s), you should return to the Emergency Department. Today's healthcare is undergoing tremendous change, and patient satisfaction surveys are one of the many tools to assess the quality of medical care. You may receive a survey from the [employer] regarding your experience in the Emergency Department. I hope that your experience has been completely positive, particularly the medical care that I provided. As such, please participate in the survey; anything less than excellent does not meet my expectations or intentions. [facility] and [address] [place] [address] participate in nationally recognized quality of care measures. If your blood pressure is greater than 120/80, as reported below, we urge that you seek medical care to address the potential of high blood pressure, commonly known as hypertension. Hypertension can be hereditary or can be caused by certain medical conditions, pain, stress, or \"white coat syndrome. \"       Please make an appointment with your health care provider(s) for follow up of your Emergency Department visit. VITALS:   Patient Vitals for the past 8 hrs:   Temp Pulse Resp BP SpO2   06/24/17 1413 98.5 °F (36.9 °C) 71 16 148/89 98 %          Thank you for allowing us to provide you with medical care today. We realize that you have many choices for your emergency care needs. Please choose us in the future for any continued health care needs.       Regards,           Andrzej Dela Cruz, DO    [employer]   Office: [phone]            Recent Results (from the past 24 hour(s))   CBC WITH AUTOMATED DIFF    Collection Time: 06/24/17  2:58 PM   Result Value Ref Range    WBC 5.6 3.6 - 11.0 K/uL    RBC 3.94 3.80 - 5.20 M/uL    HGB 10.4 (L) 11.5 - 16.0 g/dL    HCT 32.5 (L) 35.0 - 47.0 %    MCV 82.5 80.0 - 99.0 FL    MCH 26.4 26.0 - 34.0 PG    MCHC 32.0 30.0 - 36.5 g/dL    RDW 16.3 (H) 11.5 - 14.5 %    PLATELET 756 311 - 796 K/uL    NEUTROPHILS 50 32 - 75 %    LYMPHOCYTES 38 12 - 49 %    MONOCYTES 9 5 - 13 %    EOSINOPHILS 3 0 - 7 %    BASOPHILS 0 0 - 1 %    ABS. NEUTROPHILS 2.8 1.8 - 8.0 K/UL    ABS. LYMPHOCYTES 2.2 0.8 - 3.5 K/UL    ABS. MONOCYTES 0.5 0.0 - 1.0 K/UL    ABS. EOSINOPHILS 0.1 0.0 - 0.4 K/UL    ABS. BASOPHILS 0.0 0.0 - 0.1 K/UL   METABOLIC PANEL, COMPREHENSIVE    Collection Time: 06/24/17  2:58 PM   Result Value Ref Range    Sodium 141 136 - 145 mmol/L    Potassium 3.4 (L) 3.5 - 5.1 mmol/L    Chloride 104 97 - 108 mmol/L    CO2 29 21 - 32 mmol/L    Anion gap 8 5 - 15 mmol/L    Glucose 93 65 - 100 mg/dL    BUN 18 6 - 20 MG/DL    Creatinine 0.85 0.55 - 1.02 MG/DL    BUN/Creatinine ratio 21 (H) 12 - 20      GFR est AA >60 >60 ml/min/1.73m2    GFR est non-AA >60 >60 ml/min/1.73m2    Calcium 9.0 8.5 - 10.1 MG/DL    Bilirubin, total 0.2 0.2 - 1.0 MG/DL    ALT (SGPT) 19 12 - 78 U/L    AST (SGOT) 22 15 - 37 U/L    Alk.  phosphatase 79 45 - 117 U/L    Protein, total 7.4 6.4 - 8.2 g/dL    Albumin 3.4 (L) 3.5 - 5.0 g/dL    Globulin 4.0 2.0 - 4.0 g/dL    A-G Ratio 0.9 (L) 1.1 - 2.2     LIPASE    Collection Time: 06/24/17  2:58 PM   Result Value Ref Range    Lipase 167 73 - 393 U/L   URINALYSIS W/ RFLX MICROSCOPIC    Collection Time: 06/24/17  3:42 PM   Result Value Ref Range    Color YELLOW/STRAW      Appearance CLOUDY (A) CLEAR      Specific gravity 1.013 1.003 - 1.030      pH (UA) 6.5 5.0 - 8.0      Protein NEGATIVE  NEG mg/dL    Glucose NEGATIVE  NEG mg/dL    Ketone NEGATIVE  NEG mg/dL    Bilirubin NEGATIVE  NEG      Blood NEGATIVE  NEG      Urobilinogen 0.2 0.2 - 1.0 EU/dL    Nitrites NEGATIVE  NEG      Leukocyte Esterase NEGATIVE  NEG         Xr Abd Flat/ Erect    Result Date: 6/24/2017  INDICATION:   abd pain COMPARISON: 9/19/2016 FINDINGS: Supine and upright views of the abdomen demonstrate a nonobstructive bowel gas pattern. There is no free air. Pelvic calcifications are noted, likely uterine fibroids. The osseous structures are normal.     IMPRESSION: No acute process. Abdominal Pain: After Your Visit to the Emergency Room  Your Care Instructions  Many abdominal problems can cause belly pain. Some are not serious and get better on their own in a few days. Other abdominal problems need more testing and emergency treatment. Your doctor may have recommended a follow-up visit in the next 8 to 12 hours. If you are not getting better, you may need more tests or treatment. Even though you have been released from the emergency room, you still need to watch for any problems. The doctor carefully checked you. But sometimes problems can develop later. If you have new symptoms, or if your symptoms do not get better, return to the emergency room or call your doctor right away. A visit to the emergency room is only one step in your treatment. Even if you feel better, you still need to do what your doctor recommends, such as going to all suggested follow-up appointments and taking medicines exactly as directed. This will help you recover and help prevent future problems. How can you care for yourself at home? · Rest until you feel better. · Drink plenty of fluids to prevent dehydration. Choose water and other caffeine-free clear liquids until you feel better. If you have kidney, heart, or liver disease and have to limit fluids, talk with your doctor before you increase the amount of fluids you drink. · Take your medicines exactly as directed. Call your doctor if you think you are having a problem with your medicine.   · Do not drive after taking a prescription pain medicine. When should you call for help? Call 911 if:  · You passed out (lost consciousness). · You have sudden chest pain and shortness of breath, or you cough up blood. · You pass maroon or very bloody stools. · You vomit blood or what looks like coffee grounds. · You have new, severe belly pain. · You have other symptoms that you think are a medical emergency. Return to the emergency room now if:  · You feel weak and lightheaded. · Your pain becomes focused in one area of your belly. · Your belly pain is worse after you cough or move. · You have a new or higher fever. Call your doctor today if:  · Your stools are black and tarlike or have streaks of blood. · You have new, unusual bleeding or discharge from the vagina. · You have blood in your urine, it hurts when you urinate, or you have to urinate more often than usual.  · Your belly pain has not improved after 1 day. Where can you learn more? Go to [employer]  Enter D163 in the search box to learn more about \"Abdominal Pain: After Your Visit to the Emergency Room. \"   © 8144-9967 [employer]. Care instructions adapted under license by [facility] (which disclaims liability or warranty for this information). This care instruction is for use with your licensed healthcare professional. If you have questions about a medical condition or this instruction, always ask your healthcare professional. [place] [address] any warranty or liability for your use of this information.   Content Version: 9.3.88193; Last Revised: January 14, 2011

## 2017-06-26 ENCOUNTER — TELEPHONE (OUTPATIENT)
Dept: SURGERY | Age: 67
End: 2017-06-26

## 2017-06-26 DIAGNOSIS — I10 ESSENTIAL HYPERTENSION: ICD-10-CM

## 2017-06-26 NOTE — TELEPHONE ENCOUNTER
Patient just wants something to help her feel better. Dinesh Prater she will come in Thursday. Certain medication she can not take.   Said she will  anything at the [address]

## 2017-06-26 NOTE — TELEPHONE ENCOUNTER
Pt called requesting refill for her blood pressure medication be sent to [address] [place] [address] and notes she's scheduled for follow up with Dr. Tiffanie Benitez for recent ED visit for abdominal pain and possible ulcer, but will see Dr. Caty Antony for HTN follow up on 7/12/17.  Porfirio

## 2017-06-26 NOTE — TELEPHONE ENCOUNTER
Informed patient she has a appointment set up already with Dr Fco Vera he will assess her at that time.  Patient understands and did not voice and further concerns

## 2017-06-27 ENCOUNTER — APPOINTMENT (OUTPATIENT)
Dept: GENERAL RADIOLOGY | Age: 67
End: 2017-06-27
Attending: STUDENT IN AN ORGANIZED HEALTH CARE EDUCATION/TRAINING PROGRAM
Payer: MEDICAID

## 2017-06-27 ENCOUNTER — HOSPITAL ENCOUNTER (EMERGENCY)
Age: 67
Discharge: HOME | End: 2017-06-27
Attending: STUDENT IN AN ORGANIZED HEALTH CARE EDUCATION/TRAINING PROGRAM
Payer: MEDICAID

## 2017-06-27 VITALS
HEART RATE: 81 BPM | BODY MASS INDEX: 24.94 KG/M2 | OXYGEN SATURATION: 97 % | DIASTOLIC BLOOD PRESSURE: 89 MMHG | WEIGHT: 127 LBS | TEMPERATURE: 98.2 F | RESPIRATION RATE: 18 BRPM | SYSTOLIC BLOOD PRESSURE: 150 MMHG | HEIGHT: 60 IN

## 2017-06-27 DIAGNOSIS — R10.84 ABDOMINAL PAIN, GENERALIZED: Primary | ICD-10-CM

## 2017-06-27 LAB
ALBUMIN SERPL BCP-MCNC: 3.4 G/DL (ref 3.5–5)
ALBUMIN/GLOB SERPL: 0.8 {RATIO} (ref 1.1–2.2)
ALP SERPL-CCNC: 81 U/L (ref 45–117)
ALT SERPL-CCNC: 18 U/L (ref 12–78)
ANION GAP BLD CALC-SCNC: 8 MMOL/L (ref 5–15)
APPEARANCE UR: CLEAR
AST SERPL W P-5'-P-CCNC: 24 U/L (ref 15–37)
BACTERIA URNS QL MICRO: NEGATIVE /HPF
BASOPHILS # BLD AUTO: 0 K/UL (ref 0–0.1)
BASOPHILS # BLD: 0 % (ref 0–1)
BILIRUB SERPL-MCNC: 0.2 MG/DL (ref 0.2–1)
BILIRUB UR QL: NEGATIVE
BUN SERPL-MCNC: 12 MG/DL (ref 6–20)
BUN/CREAT SERPL: 14 (ref 12–20)
CALCIUM SERPL-MCNC: 9.3 MG/DL (ref 8.5–10.1)
CHLORIDE SERPL-SCNC: 104 MMOL/L (ref 97–108)
CO2 SERPL-SCNC: 29 MMOL/L (ref 21–32)
COLOR UR: ABNORMAL
CREAT SERPL-MCNC: 0.83 MG/DL (ref 0.55–1.02)
EOSINOPHIL # BLD: 0.2 K/UL (ref 0–0.4)
EOSINOPHIL NFR BLD: 3 % (ref 0–7)
EPITH CASTS URNS QL MICRO: ABNORMAL /LPF
ERYTHROCYTE [DISTWIDTH] IN BLOOD BY AUTOMATED COUNT: 16 % (ref 11.5–14.5)
GLOBULIN SER CALC-MCNC: 4.4 G/DL (ref 2–4)
GLUCOSE SERPL-MCNC: 106 MG/DL (ref 65–100)
GLUCOSE UR STRIP.AUTO-MCNC: NEGATIVE MG/DL
HCT VFR BLD AUTO: 34.7 % (ref 35–47)
HGB BLD-MCNC: 11.2 G/DL (ref 11.5–16)
HGB UR QL STRIP: ABNORMAL
HYALINE CASTS URNS QL MICRO: ABNORMAL /LPF (ref 0–5)
KETONES UR QL STRIP.AUTO: NEGATIVE MG/DL
LEUKOCYTE ESTERASE UR QL STRIP.AUTO: NEGATIVE
LIPASE SERPL-CCNC: 183 U/L (ref 73–393)
LYMPHOCYTES # BLD AUTO: 31 % (ref 12–49)
LYMPHOCYTES # BLD: 2.4 K/UL (ref 0.8–3.5)
MCH RBC QN AUTO: 26.5 PG (ref 26–34)
MCHC RBC AUTO-ENTMCNC: 32.3 G/DL (ref 30–36.5)
MCV RBC AUTO: 82.2 FL (ref 80–99)
MONOCYTES # BLD: 0.6 K/UL (ref 0–1)
MONOCYTES NFR BLD AUTO: 7 % (ref 5–13)
NEUTS SEG # BLD: 4.7 K/UL (ref 1.8–8)
NEUTS SEG NFR BLD AUTO: 59 % (ref 32–75)
NITRITE UR QL STRIP.AUTO: NEGATIVE
PH UR STRIP: 7 [PH] (ref 5–8)
PLATELET # BLD AUTO: 340 K/UL (ref 150–400)
POTASSIUM SERPL-SCNC: 3.5 MMOL/L (ref 3.5–5.1)
PROT SERPL-MCNC: 7.8 G/DL (ref 6.4–8.2)
PROT UR STRIP-MCNC: NEGATIVE MG/DL
RBC # BLD AUTO: 4.22 M/UL (ref 3.8–5.2)
RBC #/AREA URNS HPF: ABNORMAL /HPF (ref 0–5)
SODIUM SERPL-SCNC: 141 MMOL/L (ref 136–145)
SP GR UR REFRACTOMETRY: 1.01 (ref 1–1.03)
UROBILINOGEN UR QL STRIP.AUTO: 0.2 EU/DL (ref 0.2–1)
WBC # BLD AUTO: 7.9 K/UL (ref 3.6–11)
WBC URNS QL MICRO: ABNORMAL /HPF (ref 0–4)

## 2017-06-27 PROCEDURE — 74011000250 HC RX REV CODE- 250: Performed by: STUDENT IN AN ORGANIZED HEALTH CARE EDUCATION/TRAINING PROGRAM

## 2017-06-27 PROCEDURE — 36415 COLL VENOUS BLD VENIPUNCTURE: CPT | Performed by: EMERGENCY MEDICINE

## 2017-06-27 PROCEDURE — 81001 URINALYSIS AUTO W/SCOPE: CPT | Performed by: EMERGENCY MEDICINE

## 2017-06-27 PROCEDURE — 74011250637 HC RX REV CODE- 250/637: Performed by: STUDENT IN AN ORGANIZED HEALTH CARE EDUCATION/TRAINING PROGRAM

## 2017-06-27 PROCEDURE — 83690 ASSAY OF LIPASE: CPT | Performed by: EMERGENCY MEDICINE

## 2017-06-27 PROCEDURE — 74011250636 HC RX REV CODE- 250/636: Performed by: STUDENT IN AN ORGANIZED HEALTH CARE EDUCATION/TRAINING PROGRAM

## 2017-06-27 PROCEDURE — 80053 COMPREHEN METABOLIC PANEL: CPT | Performed by: EMERGENCY MEDICINE

## 2017-06-27 PROCEDURE — 85025 COMPLETE CBC W/AUTO DIFF WBC: CPT | Performed by: EMERGENCY MEDICINE

## 2017-06-27 PROCEDURE — 99284 EMERGENCY DEPT VISIT MOD MDM: CPT

## 2017-06-27 PROCEDURE — 74022 RADEX COMPL AQT ABD SERIES: CPT

## 2017-06-27 PROCEDURE — 96374 THER/PROPH/DIAG INJ IV PUSH: CPT

## 2017-06-27 PROCEDURE — 96361 HYDRATE IV INFUSION ADD-ON: CPT

## 2017-06-27 RX ORDER — ONDANSETRON 4 MG/1
4 TABLET, ORALLY DISINTEGRATING ORAL
Qty: 9 TAB | Refills: 0 | Status: SHIPPED | OUTPATIENT
Start: 2017-06-27 | End: 2017-08-22 | Stop reason: ALTCHOICE

## 2017-06-27 RX ORDER — ONDANSETRON 2 MG/ML
4 INJECTION INTRAMUSCULAR; INTRAVENOUS
Status: COMPLETED | OUTPATIENT
Start: 2017-06-27 | End: 2017-06-27

## 2017-06-27 RX ORDER — DICYCLOMINE HYDROCHLORIDE 20 MG/1
20 TABLET ORAL EVERY 6 HOURS
Qty: 20 TAB | Refills: 0 | Status: SHIPPED | OUTPATIENT
Start: 2017-06-27 | End: 2017-07-02

## 2017-06-27 RX ADMIN — LIDOCAINE HYDROCHLORIDE 40 ML: 20 SOLUTION ORAL; TOPICAL at 07:43

## 2017-06-27 RX ADMIN — SODIUM CHLORIDE 1000 ML: 900 INJECTION, SOLUTION INTRAVENOUS at 07:43

## 2017-06-27 RX ADMIN — ONDANSETRON 4 MG: 2 INJECTION INTRAMUSCULAR; INTRAVENOUS at 07:43

## 2017-06-27 NOTE — ED NOTES
Dr. Mary Jean-Baptiste reviewed discharge instructions with the patient. The patient verbalized understanding. All questions and concerns were addressed. The patient declined a wheelchair and is discharged ambulatory in the care of family members with instructions and prescriptions in hand. Pt is alert and oriented x 4. Respirations are clear and unlabored.

## 2017-06-27 NOTE — ED PROVIDER NOTES
HPI Comments: 61 y.o. female with past medical history significant for ulcers, hemorrhoids, hepatitis C, anxiety, depression, gastric bypass, cholecystectomy, and PEG tube who presents from home with chief complaint of nausea. Pt complains of nausea with associated generalized abdominal pain for the past week. Pt has been seen for same sxs at [address] [place] [address], [facility], and [facility]. Pt reports compliance with Carafate prescribed by provider at [facility], but pt reports increased nausea this morning when she woke up. Pt reports hx of ulcers; pt was operated on by Dr. Sujata Jimenes for a perforated ulcer 9 months ago. Pt denies fever, vomiting, or diarrhea. Pt is a poor historian. There are no other acute medical concerns at this time. Old Chart Review: 6/24/17 (3 days PTA) Pt was seen at [facility] and d/c with continued Carafate and Bentyl PRN. Pt has f/u with GI and general surgery scheduled. 1/14/17 for medication refill on 9/8/16 had abd CT showed free intraperitoneal air, was admitted until 9/21/16 for peritonitis and perforated ulcer complicated by delirium tremens and psychiatric admission    Social hx: Former tobacco smoker; No EtOH (reportedly quit). PCP: Rina Haro MD  General Surgeon: Trudi Rust MD      Note written by Dm. Shane Miller, as dictated by Ollie Hightower MD 7:13 AM      The history is provided by the patient. No  was used. Past Medical History:   Diagnosis Date    Anxiety     Anxiety     Blurred vision     Depression     Gastrointestinal disorder     ulcers    Hemorrhoids     Hx of hepatitis C     pt states she no longer    Psychiatric disorder     Stress        Past Surgical History:   Procedure Laterality Date    HX CHOLECYSTECTOMY      HX GASTRIC BYPASS      HX OTHER SURGICAL      PEG tube         History reviewed. No pertinent family history.     Social History     Social History    Marital status: SINGLE     Spouse name: N/A    Number of children: N/A    Years of education: N/A     Occupational History    Not on file. Social History Main Topics    Smoking status: Former Smoker     Packs/day: 0.25    Smokeless tobacco: Never Used      Comment: quit 3 months ago    Alcohol use No      Comment: quit drinking one month prior    Drug use: No    Sexual activity: Not on file     Other Topics Concern    Not on file     Social History Narrative         ALLERGIES: Review of patient's allergies indicates no known allergies. Review of Systems   Constitutional: Negative for chills and fever. HENT: Negative for ear pain and sore throat. Eyes: Negative for pain. Respiratory: Negative for chest tightness and shortness of breath. Cardiovascular: Negative for chest pain and leg swelling. Gastrointestinal: Positive for abdominal pain and nausea. Negative for diarrhea and vomiting. Genitourinary: Negative for dysuria and flank pain. Musculoskeletal: Negative for back pain and neck pain. Skin: Negative for rash and wound. Neurological: Negative for dizziness, light-headedness and headaches. All other systems reviewed and are negative. Vitals:    06/27/17 0647   BP: (!) 137/96   Pulse: 81   Resp: 18   Temp: 98.2 °F (36.8 °C)   SpO2: 98%   Weight: 57.6 kg (127 lb)   Height: 5' (1.524 m)            Physical Exam   Constitutional: She is oriented to person, place, and time. She appears well-developed. No distress. HENT:   Head: Normocephalic and atraumatic. Eyes: Conjunctivae and EOM are normal. Pupils are equal, round, and reactive to light. Neck: Normal range of motion. Neck supple. Cardiovascular: Normal rate, regular rhythm and normal heart sounds. No murmur heard. Pulmonary/Chest: Effort normal and breath sounds normal. No respiratory distress. Abdominal: Soft. Bowel sounds are normal. She exhibits no distension. There is tenderness (diffuse non-focal mild tenderness to palpation). There is no rebound. Musculoskeletal: Normal range of motion. She exhibits no edema. Neurological: She is alert and oriented to person, place, and time. No cranial nerve deficit. She exhibits normal muscle tone. Coordination normal.   Skin: Skin is warm and dry. No rash noted. Psychiatric: She has a normal mood and affect. Her behavior is normal.   Nursing note and vitals reviewed. Note written by Dm. Luis Cruz, as dictated by Anderson Wiggins MD 7:14 AM        [facility]  ED Course       Procedures  PROGRESS NOTE:  9:02 AM  Re-evaluation: Pt is feeling better. Tolerated glucerna shake w/o vomiting has f/u with Dr. Xuan Maldonado on 6/29/2017. Discussed results and plan of care with pt. The patient is resting comfortably and feels better, is alert and in no distress. The repeat examination is unremarkable and benign; in particular, there is no discomfort at McBurney's point and there is no pulsatile mass. The history, exam, diagnostic testing, and current condition do not suggest acute appendicitis, bowel obstruction, acute cholecystitis, bowel perforation, major gastrointestinal bleeding, severe diverticulitis, abdominal aortic aneurysm, mesenteric ischemia, volvulus, sepsis, or other significant pathology to warrant further testing, continued ED treatment, admission, or surgical evaluation at this point. The vital signs have been stable. The patient does not have uncontrollable pain, intractable vomiting, or other significant symptoms. The patient's condition is stable and appropriate for discharge from the emergency department. The patient will pursue further outpatient evaluation with the primary care physician or other designated or consulting physician as indicated in the discharge instructions.

## 2017-06-27 NOTE — DISCHARGE INSTRUCTIONS
We hope that we have addressed all of your medical concerns. The examination and treatment you received in the Emergency Department were for an emergent problem and were not intended as complete care. It is important that you follow up with your healthcare provider(s) for ongoing care. If your symptoms worsen or do not improve as expected, and you are unable to reach your usual health care provider(s), you should return to the Emergency Department. Today's healthcare is undergoing tremendous change, and patient satisfaction surveys are one of the many tools to assess the quality of medical care. You may receive a survey from the [employer] regarding your experience in the Emergency Department. I hope that your experience has been completely positive, particularly the medical care that I provided. As such, please participate in the survey; anything less than excellent does not meet my expectations or intentions. [facility] and [address] [place] [address] participate in nationally recognized quality of care measures. If your blood pressure is greater than 120/80, as reported below, we urge that you seek medical care to address the potential of high blood pressure, commonly known as hypertension. Hypertension can be hereditary or can be caused by certain medical conditions, pain, stress, or \"white coat syndrome. \"       Please make an appointment with your health care provider(s) for follow up of your Emergency Department visit. VITALS:   Patient Vitals for the past 8 hrs:   Temp Pulse Resp BP SpO2   06/27/17 0647 98.2 °F (36.8 °C) 81 18 (!) 137/96 98 %          Thank you for allowing us to provide you with medical care today. We realize that you have many choices for your emergency care needs. Please choose us in the future for any continued health care needs. Elroy Anaya, 16 [facility]. Office: [phone]            Recent Results (from the past 24 hour(s))   CBC WITH AUTOMATED DIFF    Collection Time: 06/27/17  6:53 AM   Result Value Ref Range    WBC 7.9 3.6 - 11.0 K/uL    RBC 4.22 3.80 - 5.20 M/uL    HGB 11.2 (L) 11.5 - 16.0 g/dL    HCT 34.7 (L) 35.0 - 47.0 %    MCV 82.2 80.0 - 99.0 FL    MCH 26.5 26.0 - 34.0 PG    MCHC 32.3 30.0 - 36.5 g/dL    RDW 16.0 (H) 11.5 - 14.5 %    PLATELET 178 690 - 041 K/uL    NEUTROPHILS 59 32 - 75 %    LYMPHOCYTES 31 12 - 49 %    MONOCYTES 7 5 - 13 %    EOSINOPHILS 3 0 - 7 %    BASOPHILS 0 0 - 1 %    ABS. NEUTROPHILS 4.7 1.8 - 8.0 K/UL    ABS. LYMPHOCYTES 2.4 0.8 - 3.5 K/UL    ABS. MONOCYTES 0.6 0.0 - 1.0 K/UL    ABS. EOSINOPHILS 0.2 0.0 - 0.4 K/UL    ABS. BASOPHILS 0.0 0.0 - 0.1 K/UL   METABOLIC PANEL, COMPREHENSIVE    Collection Time: 06/27/17  6:53 AM   Result Value Ref Range    Sodium 141 136 - 145 mmol/L    Potassium 3.5 3.5 - 5.1 mmol/L    Chloride 104 97 - 108 mmol/L    CO2 29 21 - 32 mmol/L    Anion gap 8 5 - 15 mmol/L    Glucose 106 (H) 65 - 100 mg/dL    BUN 12 6 - 20 MG/DL    Creatinine 0.83 0.55 - 1.02 MG/DL    BUN/Creatinine ratio 14 12 - 20      GFR est AA >60 >60 ml/min/1.73m2    GFR est non-AA >60 >60 ml/min/1.73m2    Calcium 9.3 8.5 - 10.1 MG/DL    Bilirubin, total 0.2 0.2 - 1.0 MG/DL    ALT (SGPT) 18 12 - 78 U/L    AST (SGOT) 24 15 - 37 U/L    Alk.  phosphatase 81 45 - 117 U/L    Protein, total 7.8 6.4 - 8.2 g/dL    Albumin 3.4 (L) 3.5 - 5.0 g/dL    Globulin 4.4 (H) 2.0 - 4.0 g/dL    A-G Ratio 0.8 (L) 1.1 - 2.2     LIPASE    Collection Time: 06/27/17  6:53 AM   Result Value Ref Range    Lipase 183 73 - 393 U/L   URINALYSIS W/ RFLX MICROSCOPIC    Collection Time: 06/27/17  7:47 AM   Result Value Ref Range    Color YELLOW/STRAW      Appearance CLEAR CLEAR      Specific gravity 1.011 1.003 - 1.030      pH (UA) 7.0 5.0 - 8.0      Protein NEGATIVE  NEG mg/dL    Glucose NEGATIVE  NEG mg/dL    Ketone NEGATIVE  NEG mg/dL    Bilirubin NEGATIVE  NEG Blood TRACE (A) NEG      Urobilinogen 0.2 0.2 - 1.0 EU/dL    Nitrites NEGATIVE  NEG      Leukocyte Esterase NEGATIVE  NEG      WBC 0-4 0 - 4 /hpf    RBC 0-5 0 - 5 /hpf    Epithelial cells FEW FEW /lpf    Bacteria NEGATIVE  NEG /hpf    Hyaline cast 0-2 0 - 5 /lpf       Xr Abd Acute W 1 V Chest    Result Date: 6/27/2017  EXAM:  XR ABD ACUTE W 1 V CHEST INDICATION:  abd pain COMPARISON: 2017. FINDINGS: Supine chest radiograph demonstrates clear lungs and normal cardiac and mediastinal contours. No pleural effusion is identified. . Supine and decubitus views of the abdomen demonstrate a few air-fluid levels in minimally distended small bowel midabdomen nonspecific without definite evidence of obstruction There is no free intraperitoneal air. There are a few scattered air-fluid levels. Calcifications in the pelvis are consistent with calcified leiomyomata. . The bones are within normal limits. IMPRESSION: No free air. Gas pattern is nonobstructed. Abdominal Pain: Care Instructions  Your Care Instructions    Abdominal pain has many possible causes. Some aren't serious and get better on their own in a few days. Others need more testing and treatment. If your pain continues or gets worse, you need to be rechecked and may need more tests to find out what is wrong. You may need surgery to correct the problem. Don't ignore new symptoms, such as fever, nausea and vomiting, urination problems, pain that gets worse, and dizziness. These may be signs of a more serious problem. Your doctor may have recommended a follow-up visit in the next 8 to 12 hours. If you are not getting better, you may need more tests or treatment. The doctor has checked you carefully, but problems can develop later. If you notice any problems or new symptoms, get medical treatment right away. Follow-up care is a key part of your treatment and safety.  Be sure to make and go to all appointments, and call your doctor if you are having problems. It's also a good idea to know your test results and keep a list of the medicines you take. How can you care for yourself at home? · Rest until you feel better. · To prevent dehydration, drink plenty of fluids, enough so that your urine is light yellow or clear like water. Choose water and other caffeine-free clear liquids until you feel better. If you have kidney, heart, or liver disease and have to limit fluids, talk with your doctor before you increase the amount of fluids you drink. · If your stomach is upset, eat mild foods, such as rice, dry toast or crackers, bananas, and applesauce. Try eating several small meals instead of two or three large ones. · Wait until 48 hours after all symptoms have gone away before you have spicy foods, alcohol, and drinks that contain caffeine. · Do not eat foods that are high in fat. · Avoid anti-inflammatory medicines such as aspirin, ibuprofen (Advil, Motrin), and naproxen (Aleve). These can cause stomach upset. Talk to your doctor if you take daily aspirin for another health problem. When should you call for help? Call 911 anytime you think you may need emergency care. For example, call if:  · You passed out (lost consciousness). · You pass maroon or very bloody stools. · You vomit blood or what looks like coffee grounds. · You have new, severe belly pain. Call your doctor now or seek immediate medical care if:  · Your pain gets worse, especially if it becomes focused in one area of your belly. · You have a new or higher fever. · Your stools are black and look like tar, or they have streaks of blood. · You have unexpected vaginal bleeding. · You have symptoms of a urinary tract infection. These may include:  ¨ Pain when you urinate. ¨ Urinating more often than usual.  ¨ Blood in your urine. · You are dizzy or lightheaded, or you feel like you may faint.   Watch closely for changes in your health, and be sure to contact your doctor if:  · You are not getting better after 1 day (24 hours). Where can you learn more? Go to http://betsy-adair.info/. Enter C419 in the search box to learn more about \"Abdominal Pain: Care Instructions. \"  Current as of: March 20, 2017  Content Version: 11.3  © 5042-9701 [employer]. Care instructions adapted under license by [employer] (which disclaims liability or warranty for this information). If you have questions about a medical condition or this instruction, always ask your healthcare professional. [address] any warranty or liability for your use of this information.

## 2017-06-27 NOTE — ED TRIAGE NOTES
Pt arrives today by EMS with c/o of abdominal pain for the last couple days, was seen previously this week for the same problem . Pt states has nausea, denies any vomiting or diarhea.

## 2017-06-28 RX ORDER — METOPROLOL TARTRATE 50 MG/1
50 TABLET ORAL 2 TIMES DAILY
Qty: 60 TAB | Refills: 1 | Status: SHIPPED | OUTPATIENT
Start: 2017-06-28 | End: 2017-07-14 | Stop reason: SDUPTHER

## 2017-06-29 ENCOUNTER — OFFICE VISIT (OUTPATIENT)
Dept: SURGERY | Age: 67
End: 2017-06-29

## 2017-06-29 VITALS
TEMPERATURE: 98.4 F | DIASTOLIC BLOOD PRESSURE: 69 MMHG | HEART RATE: 66 BPM | HEIGHT: 60 IN | SYSTOLIC BLOOD PRESSURE: 107 MMHG | RESPIRATION RATE: 14 BRPM | BODY MASS INDEX: 25.32 KG/M2 | WEIGHT: 129 LBS | OXYGEN SATURATION: 98 %

## 2017-06-29 DIAGNOSIS — F10.10 ALCOHOL ABUSE: ICD-10-CM

## 2017-06-29 DIAGNOSIS — K28.9 MARGINAL ULCER: ICD-10-CM

## 2017-06-29 DIAGNOSIS — R10.10 PAIN OF UPPER ABDOMEN: Primary | ICD-10-CM

## 2017-06-29 DIAGNOSIS — Z72.0 TOBACCO ABUSE: ICD-10-CM

## 2017-06-29 RX ORDER — PHENOL/SODIUM PHENOLATE
20 AEROSOL, SPRAY (ML) MUCOUS MEMBRANE DAILY
Qty: 84 TAB | Refills: 0 | Status: SHIPPED | OUTPATIENT
Start: 2017-06-29 | End: 2017-08-22 | Stop reason: SDUPTHER

## 2017-06-29 NOTE — PROGRESS NOTES
1. Have you been to the ER, urgent care clinic since your last visit? Hospitalized since your last visit? yes [facility]    2. Have you seen or consulted any other health care providers outside of the [address] [place] [address] since your last visit? Include any pap smears or colon screening.  no

## 2017-06-29 NOTE — PROGRESS NOTES
Subjective:      Leno Wang is a 61 y.o. female presents with complaint of epigastric pain. Pain is constant and rated as severe. She denies nausea   Appetite is good. Eating a regular diet without difficulty. Pain has motivated her to stop drinking. She states she has no had alcohol in a 'few' weeks  She claims she stopped smoking 29 days ago except for one cigarette a day. Bowel movements are regular. She stop taking her PPI because it ran out. She is taking 3 gummy vitamins a day as well as sublingual B12     Objective:     Visit Vitals    /69 (BP 1 Location: Left arm, BP Patient Position: Sitting)    Pulse 66    Temp 98.4 °F (36.9 °C) (Oral)    Resp 14    Ht 5' (1.524 m)    Wt 129 lb (58.5 kg)    SpO2 98%    BMI 25.19 kg/m2       General:  alert, cooperative, no distress, appears much older than stated age   Abdomen: soft, bowel sounds active, non-tender     CT report for OSH -  Stranding and thickening of anastomosis     Assessment:     61year old female with history of gastric bypass. Patient smokes and up until recently drank in excess. She admits to smoking Jeneal Furlough as well. These behaviors are high risk for marginal ulcers. Patient has a history of marginal ulcers in the past including an acute perforation. Given her symptoms, behavior and CT appearance I suspect she has a significant recurrent marginal ulcer. I don't think endoscopy is needed to assess for an ulcer based on this and make cause a perforation give the appearance on CT scan. I counseled the patient on smoking cessation. I explained that the smoking is directly linked to her pain and is putting her at risk for another perforation. I recommend she stop immediately. She states her daughter never smoked and had an ulcer so, she is not sure what I'm say is completely true. But, she know she needs to quit for her health. Plan:     1. Stop smoking  2. I order her PPI   3.   Follow up in 3 months or sooner as needed

## 2017-07-14 ENCOUNTER — TELEPHONE (OUTPATIENT)
Dept: SURGERY | Age: 67
End: 2017-07-14

## 2017-07-14 DIAGNOSIS — I10 ESSENTIAL HYPERTENSION: ICD-10-CM

## 2017-07-14 NOTE — TELEPHONE ENCOUNTER
----- Message from Cierra Moreno sent at 7/14/2017 10:09 AM EDT -----  Regarding: Denise/ casey   Pt is requesting  A refill on her Metropolol  50 mg twice  a day BP medictation to be [facility] and best contact number is [phone].

## 2017-07-15 RX ORDER — METOPROLOL TARTRATE 50 MG/1
50 TABLET ORAL 2 TIMES DAILY
Qty: 60 TAB | Refills: 11 | Status: SHIPPED | OUTPATIENT
Start: 2017-07-15 | End: 2017-08-22 | Stop reason: DRUGHIGH

## 2017-07-25 RX ORDER — SERTRALINE HYDROCHLORIDE 50 MG/1
TABLET, FILM COATED ORAL
Qty: 30 TAB | Refills: 0 | OUTPATIENT
Start: 2017-07-25

## 2017-07-27 ENCOUNTER — TELEPHONE (OUTPATIENT)
Dept: BEHAVIORAL/MENTAL HEALTH CLINIC | Age: 67
End: 2017-07-27

## 2017-07-31 ENCOUNTER — TELEPHONE (OUTPATIENT)
Dept: SURGERY | Age: 67
End: 2017-07-31

## 2017-07-31 DIAGNOSIS — F43.10 PTSD (POST-TRAUMATIC STRESS DISORDER): ICD-10-CM

## 2017-07-31 DIAGNOSIS — F32.A DEPRESSION, UNSPECIFIED DEPRESSION TYPE: ICD-10-CM

## 2017-07-31 RX ORDER — VENLAFAXINE HYDROCHLORIDE 150 MG/1
CAPSULE, EXTENDED RELEASE ORAL
Qty: 30 CAP | Refills: 11 | Status: SHIPPED | OUTPATIENT
Start: 2017-07-31 | End: 2017-08-01

## 2017-07-31 NOTE — TELEPHONE ENCOUNTER
Patient needs lab request to be sent to [facility] for H-Pylori. And Hep. C. Call patient at [phone]. Rommel Mulligan

## 2017-08-01 ENCOUNTER — OFFICE VISIT (OUTPATIENT)
Dept: BEHAVIORAL/MENTAL HEALTH CLINIC | Age: 67
End: 2017-08-01

## 2017-08-01 VITALS
OXYGEN SATURATION: 98 % | HEART RATE: 62 BPM | SYSTOLIC BLOOD PRESSURE: 155 MMHG | WEIGHT: 125 LBS | DIASTOLIC BLOOD PRESSURE: 102 MMHG | HEIGHT: 60 IN | BODY MASS INDEX: 24.54 KG/M2

## 2017-08-01 DIAGNOSIS — F12.10 CANNABIS ABUSE: ICD-10-CM

## 2017-08-01 DIAGNOSIS — F32.A DEPRESSION, UNSPECIFIED DEPRESSION TYPE: ICD-10-CM

## 2017-08-01 DIAGNOSIS — Z91.199 NON-COMPLIANCE WITH TREATMENT: ICD-10-CM

## 2017-08-01 DIAGNOSIS — F10.10 ALCOHOL ABUSE: ICD-10-CM

## 2017-08-01 DIAGNOSIS — F43.10 PTSD (POST-TRAUMATIC STRESS DISORDER): Primary | ICD-10-CM

## 2017-08-01 DIAGNOSIS — F60.9 PERSONALITY DISORDER (HCC): ICD-10-CM

## 2017-08-01 DIAGNOSIS — F43.10 PTSD (POST-TRAUMATIC STRESS DISORDER): ICD-10-CM

## 2017-08-01 RX ORDER — QUETIAPINE 150 MG/1
150 TABLET, FILM COATED, EXTENDED RELEASE ORAL
Qty: 30 TAB | Refills: 0 | Status: SHIPPED | OUTPATIENT
Start: 2017-08-01 | End: 2017-08-17 | Stop reason: SDUPTHER

## 2017-08-01 RX ORDER — DULOXETIN HYDROCHLORIDE 30 MG/1
30 CAPSULE, DELAYED RELEASE ORAL DAILY
Qty: 30 CAP | Refills: 0 | Status: SHIPPED | OUTPATIENT
Start: 2017-08-01 | End: 2017-08-17 | Stop reason: SDUPTHER

## 2017-08-01 NOTE — TELEPHONE ENCOUNTER
Spoke with patient informed her Dr Kasey Perera did not order any labs for her. She was last seen in our office 6/29/17. She said she is at [facility] now and wanted Dr Kasey Perera to put in lab orders for her regarding her hep c. Informed patient Dr Kasey Perera is not in the office today and she should contact her PCP.  Patent agrees and did not voice any further concerns
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
## 2017-08-02 RX ORDER — VENLAFAXINE HYDROCHLORIDE 150 MG/1
CAPSULE, EXTENDED RELEASE ORAL
Qty: 30 CAP | Refills: 11 | OUTPATIENT
Start: 2017-08-02

## 2017-08-17 ENCOUNTER — OFFICE VISIT (OUTPATIENT)
Dept: BEHAVIORAL/MENTAL HEALTH CLINIC | Age: 67
End: 2017-08-17

## 2017-08-17 VITALS
OXYGEN SATURATION: 98 % | HEIGHT: 60 IN | DIASTOLIC BLOOD PRESSURE: 97 MMHG | BODY MASS INDEX: 24.74 KG/M2 | WEIGHT: 126 LBS | HEART RATE: 58 BPM | SYSTOLIC BLOOD PRESSURE: 167 MMHG

## 2017-08-17 DIAGNOSIS — F33.1 MODERATE EPISODE OF RECURRENT MAJOR DEPRESSIVE DISORDER (HCC): ICD-10-CM

## 2017-08-17 DIAGNOSIS — Z86.59 HISTORY OF POSTTRAUMATIC STRESS DISORDER (PTSD): ICD-10-CM

## 2017-08-17 DIAGNOSIS — F60.3 BORDERLINE PERSONALITY DISORDER (HCC): Primary | ICD-10-CM

## 2017-08-17 DIAGNOSIS — F39 MOOD DISORDER (HCC): ICD-10-CM

## 2017-08-17 RX ORDER — DULOXETIN HYDROCHLORIDE 30 MG/1
30 CAPSULE, DELAYED RELEASE ORAL DAILY
Qty: 30 CAP | Refills: 0 | Status: SHIPPED | OUTPATIENT
Start: 2017-08-17 | End: 2017-12-22 | Stop reason: SDUPTHER

## 2017-08-17 RX ORDER — QUETIAPINE 150 MG/1
150 TABLET, FILM COATED, EXTENDED RELEASE ORAL
Qty: 30 TAB | Refills: 0 | Status: SHIPPED | OUTPATIENT
Start: 2017-08-17 | End: 2017-11-29 | Stop reason: SDUPTHER
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
## 2017-08-18 ENCOUNTER — OFFICE VISIT (OUTPATIENT)
Dept: FAMILY MEDICINE CLINIC | Age: 67
End: 2017-08-18

## 2017-08-18 NOTE — MR AVS SNAPSHOT
Visit Information Date & Time Provider Department Dept. Phone Encounter #  
 8/18/2017  4:05 PM Jaylyn Maciel MD [address] [place] [address] [phone] [id number] Your Appointments 8/18/2017  4:05 PM  
New Patient with Jaylyn Maciel MD  
[address] [place] [address]) Appt Note: NP est PCP/meds, refills; NP est PCP/meds, refills; r/s [facility] patient/elevated bp  
 [address] [place], [place] [address]t [facility]. [address] [place] [address]  
[phone]  
  
   
 [address] [facility] [address]  
  
    
 8/22/2017 11:00 AM  
New Patient with Jarett Gongora MD  
[address] [place] [address]) Appt Note: NP est PCP/meds, refills; NP est PCP/meds, refills; r/s [facility] patient/elevated bp; r/s from Friday. Did not want to wait for apt as was dropped off early [address] [employer] [facility] [address] [facility]  
[phone]  
  
   
 [address] [facility] [address] Upcoming Health Maintenance Date Due Hepatitis C Screening 1950 DTaP/Tdap/Td series (1 - Tdap) 8/3/1973 PAP AKA CERVICAL CYTOLOGY 8/3/1973 BREAST CANCER SCRN MAMMOGRAM 8/3/2002 FOBT Q 1 YEAR AGE 50-75 8/3/2002 ZOSTER VACCINE AGE 60> 6/3/2012 INFLUENZA AGE 9 TO ADULT 8/1/2017 GLAUCOMA SCREENING Q2Y 8/3/2017 OSTEOPOROSIS SCREENING (DEXA) 8/3/2017 Pneumococcal 65+ Low/Medium Risk (1 of 2 - PCV13) 8/3/2017 MEDICARE YEARLY EXAM 8/3/2017 Allergies as of 8/18/2017  Review Complete On: 8/17/2017 By: Yocasta Hoskins CNA No Known Allergies Current Immunizations  Reviewed on 9/15/2016 No immunizations on file. Not reviewed this visit Vitals OB Status Smoking Status Menopause Former Smoker Your Updated Medication List  
  
   
This list is accurate as of: 8/18/17  2:21 PM.  Always use your most recent med list.  
  
  
  
  
 DULoxetine 30 mg capsule Commonly known as:  CYMBALTA Take 1 Cap by mouth daily. esomeprazole magnesium 20 mg Tbec Commonly known as:  NexIUM 24HR Take 40 mg by mouth daily. metoprolol tartrate 50 mg tablet Commonly known as:  LOPRESSOR Take 1 Tab by mouth two (2) times a day. For hypertension Omeprazole delayed release 20 mg tablet Commonly known as:  PRILOSEC D/R Take 1 Tab by mouth daily for 84 days. Indications: marginal ulcer, history of perforated ulcer in the past  
  
 ondansetron 4 mg disintegrating tablet Commonly known as:  ZOFRAN ODT Take 1 Tab by mouth every eight (8) hours as needed for Nausea. QUEtiapine  mg sr tablet Commonly known as:  SEROquel XR Take 1 Tab by mouth nightly. sucralfate 1 gram tablet Commonly known as:  Leitha Satinder Take 1 g by mouth three (3) times daily. Introducing [facility] & [facility]! Jaime Mcdonnell introduces [employer] patient portal. Now you can access parts of your medical record, email your doctor's office, and request medication refills online. 1. In your internet browser, go to [email or web address] 2. Click on the First Time User? Click Here link in the Sign In box. You will see the New Member Sign Up page. 3. Enter your [employer] Access Code exactly as it appears below. You will not need to use this code after youve completed the sign-up process. If you do not sign up before the expiration date, you must request a new code. · [employer] Access Code: [facility] Expires: 9/22/2017  3:59 PM 
 
4. Enter the last four digits of your Social Security Number (xxxx) and Date of Birth (mm/dd/yyyy) as indicated and click Submit. You will be taken to the next sign-up page. 5. Create a Tocagen ID. This will be your [employer] login ID and cannot be changed, so think of one that is secure and easy to remember. 6. Create a Ntiretyt password. You can change your password at any time. 7. Enter your Password Reset Question and Answer. This can be used at a later time if you forget your password. 8. Enter your e-mail address. You will receive e-mail notification when new information is available in [address] 9. Click Sign Up. You can now view and download portions of your medical record. 10. Click the Download Summary menu link to download a portable copy of your medical information. If you have questions, please visit the Frequently Asked Questions section of the [employer] website. Remember, [employer] is NOT to be used for urgent needs. For medical emergencies, dial 911. Now available from your iPhone and Android! Please provide this summary of care documentation to your next provider. Your primary care clinician is listed as Naomy Cantu. If you have any questions after today's visit, please call [phone].

## 2017-08-22 ENCOUNTER — OFFICE VISIT (OUTPATIENT)
Dept: FAMILY MEDICINE CLINIC | Age: 67
End: 2017-08-22

## 2017-08-22 VITALS
SYSTOLIC BLOOD PRESSURE: 146 MMHG | HEART RATE: 58 BPM | TEMPERATURE: 99.3 F | DIASTOLIC BLOOD PRESSURE: 88 MMHG | BODY MASS INDEX: 24.54 KG/M2 | WEIGHT: 125 LBS | HEIGHT: 60 IN | RESPIRATION RATE: 17 BRPM | OXYGEN SATURATION: 98 %

## 2017-08-22 DIAGNOSIS — F32.A DEPRESSION, UNSPECIFIED DEPRESSION TYPE: ICD-10-CM

## 2017-08-22 DIAGNOSIS — Z76.0 MEDICATION REFILL: ICD-10-CM

## 2017-08-22 DIAGNOSIS — G62.9 NEUROPATHY: ICD-10-CM

## 2017-08-22 DIAGNOSIS — F41.9 ANXIETY: ICD-10-CM

## 2017-08-22 DIAGNOSIS — F43.10 PTSD (POST-TRAUMATIC STRESS DISORDER): ICD-10-CM

## 2017-08-22 DIAGNOSIS — M79.7 FIBROMYALGIA: ICD-10-CM

## 2017-08-22 DIAGNOSIS — I10 ESSENTIAL HYPERTENSION: Primary | ICD-10-CM

## 2017-08-22 RX ORDER — GABAPENTIN 400 MG/1
400 CAPSULE ORAL 3 TIMES DAILY
Qty: 90 CAP | Refills: 0 | Status: SHIPPED | OUTPATIENT
Start: 2017-08-22 | End: 2017-11-09 | Stop reason: SDUPTHER

## 2017-08-22 RX ORDER — PHENOL/SODIUM PHENOLATE
20 AEROSOL, SPRAY (ML) MUCOUS MEMBRANE DAILY
Qty: 28 TAB | Refills: 12 | Status: SHIPPED | OUTPATIENT
Start: 2017-08-22 | End: 2017-09-14

## 2017-08-22 RX ORDER — SUCRALFATE 1 G/1
1 TABLET ORAL 3 TIMES DAILY
Qty: 30 TAB | Refills: 2 | Status: SHIPPED | OUTPATIENT
Start: 2017-08-22 | End: 2018-06-27

## 2017-08-22 RX ORDER — METOPROLOL TARTRATE 75 MG/1
75 TABLET, FILM COATED ORAL 2 TIMES DAILY
Qty: 30 TAB | Refills: 0 | Status: SHIPPED | OUTPATIENT
Start: 2017-08-22 | End: 2017-10-27 | Stop reason: SDUPTHER

## 2017-08-22 NOTE — PROGRESS NOTES
HPI     CC: medication refill    Srinivasa Jaeger is a 74 y.o. female with hx of HTN, Depression, Anxiety, Fibromyalgia, PTSD, who presents as a new patient for hypertension and medication refill. Patient is transferring care to this practice. Recently moved here last year after her son passed away. Hypertension   - Has been on metoprolol 50 BID, but states that SBP has been ranging 160s for about 1 year. - yesterday, pt increasing her dosage to 75 BID with improvement in SBP to 117-120. Has not done this again, because wanted to verify with physician if this was acceptable first.     Perforated ulcer   - s/p surgery in 9/2016. 2 months ago, was seen to have a second ulcer but without bleed or perforation. Is taking omeprazole and occasional carafate for ulcer prevention. Quit smoking 2 months ago. Depression, anxiety, PTSD  - Is currently in between psychiatrists. Previously seen in [place], but was removed from the practice because she started arguing with provider. new appointment with psychiatry scheduled for September 13, 2017 with Dr. Carlos Merino. - stable, on Duloxetine with some effect. Fibromyalgia, non-diabetic neuropathy - stable, on gabapentin. PMHx:  Past Medical History:   Diagnosis Date    Anxiety     Anxiety     Blurred vision     Depression     Fibromyalgia     Gastrointestinal disorder     Hemorrhoids     History of bleeding peptic ulcer     with perforation. s/p repair 9/2016. 2nd bleed 6/2017    History of smoking     Hx of hepatitis C     pt states she no longer    Psychiatric disorder     PTSD (post-traumatic stress disorder)     Stress        Meds:   Current Outpatient Prescriptions   Medication Sig Dispense Refill    sucralfate (CARAFATE) 1 gram tablet Take 1 Tab by mouth three (3) times daily. 30 Tab 2    Omeprazole delayed release (PRILOSEC D/R) 20 mg tablet Take 1 Tab by mouth daily.  Indications: marginal ulcer, history of perforated ulcer in the past 28 Tab 12    metoprolol tartrate 75 mg tab Take 75 mg by mouth two (2) times a day. 30 Tab 0    gabapentin (NEURONTIN) 400 mg capsule Take 1 Cap by mouth three (3) times daily for 30 days. Indications: fibromyalgia 90 Cap 0    DULoxetine (CYMBALTA) 30 mg capsule Take 1 Cap by mouth daily. 30 Cap 0    QUEtiapine SR (SEROQUEL XR) 150 mg sr tablet Take 1 Tab by mouth nightly. 30 Tab 0       Allergies:   No Known Allergies    Smoker:  History   Smoking Status    Former Smoker    Packs/day: 0.25   Smokeless Tobacco    Never Used     Comment: quit 3 months ago       ETOH:   History   Alcohol Use No     Comment: quit drinking one month prior       FH:   History reviewed. No pertinent family history. ROS:  Review of Systems   Constitutional: Negative for chills, fatigue, fever and unexpected weight change. Eyes: Negative for visual disturbance. Respiratory: Negative for chest tightness, shortness of breath and wheezing. Cardiovascular: Negative for chest pain and leg swelling. Gastrointestinal: Negative for abdominal pain, anal bleeding, blood in stool, constipation, diarrhea, nausea and vomiting. Genitourinary: Negative for dysuria and hematuria. Musculoskeletal: Positive for myalgias. Neurological: Negative for weakness, light-headedness, numbness and headaches. Psychiatric/Behavioral: Negative. Negative for confusion, decreased concentration, dysphoric mood, self-injury and suicidal ideas. The patient is not nervous/anxious and is not hyperactive.         Physical Exam:  Visit Vitals    /88 (BP 1 Location: Left arm, BP Patient Position: Sitting)    Pulse (!) 58    Temp 99.3 °F (37.4 °C) (Oral)    Resp 17    Ht 5' (1.524 m)    Wt 125 lb (56.7 kg)    SpO2 98%    BMI 24.41 kg/m2       Wt Readings from Last 3 Encounters:   08/22/17 125 lb (56.7 kg)   08/17/17 126 lb (57.2 kg)   08/01/17 125 lb (56.7 kg)     BP Readings from Last 3 Encounters:   08/22/17 146/88   08/17/17 (!) 167/97   08/01/17 (!) 155/102        Physical Exam   Constitutional: She appears well-developed and well-nourished. No distress. HENT:   Head: Normocephalic and atraumatic. Eyes: EOM are normal. No scleral icterus. Neck: Normal range of motion. Cardiovascular: Normal rate and regular rhythm. No murmur heard. Pulmonary/Chest: Effort normal and breath sounds normal. No respiratory distress. Scattered wheezes   Musculoskeletal: Normal range of motion. She exhibits no edema or tenderness. Neurological:   AOx3. Normal tone. Normal sensation of BLE   Skin: Skin is warm and dry. No rash noted. She is not diaphoretic. No erythema. Psychiatric: She has a normal mood and affect. Her behavior is normal. Thought content normal.   Nursing note and vitals reviewed. Assessment     74 y.o. female with hx of HTN, anxiety, depression, PTSD, and perforated peptic ulcer presents for hypertension and medication refill:  Patient Active Problem List   Diagnosis Code    Perforated ulcer ([facility] K27.5    Malnutrition ([facility] E46    Alcohol abuse F10.10    Protein-calorie malnutrition, severe ([address] E43    Essential hypertension I10    History of mammogram Z92.89    History of Papanicolaou smear of cervix Z98.890    Impaired cognition R41.89    Anxiety F41.9    PTSD (post-traumatic stress disorder) F43.10    Cannabis abuse F12.10    Non-compliance with treatment Z91.19    Personality disorder F60.9    Fibromyalgia M79.7    Neuropathy ([facility] G62.9       Today's diagnoses are:    ICD-10-CM ICD-9-CM    1. Essential hypertension I10 401.9    2. Fibromyalgia M79.7 729.1    3. Neuropathy (HCC) G62.9 355.9    4. Depression, unspecified depression type F32.9 311    5. Anxiety F41.9 300.00    6. PTSD (post-traumatic stress disorder) F43.10 309.81    7. Medication refill Z76.0 V68.1               Plan     1. HTN - BP of 146/88 today. Has been consistently SBP 160s on Metoprolol 50 BID at home.   - trial of Metoprolol 75 BID   - keep BP log and bring to next appointment for adjustment of BPs    2. Fibromyalgia, Neuropathy - stable, on gabapentin  - lowered dose of Gabapentin than previously prescribed. Gabapentin 400 TID     3. Depression, Anxiety, PTSD - stable on duloxetine and seroquel. PHQ2 score of 0. Currently in between psychiatrists; appointment scheduled for September with Dr. Lynn Sarmiento  - continue Duloxetine 30 mg daily   - continue Seroquel 150 mg QHS   - follow up with psychiatry     4. Hx of GIB from bleeding ulcer, Hx of perforated ulcer s/p repair 9/2016 - 2nd ulcer seen 2 months ago. Stable, on Prilosec and PRN carafate  - continue Prilosec 20 mg daily  - continue PRN Carafate 1g TID     5. Follow up in 1 month for BP reevaluation     Prior labs and imaging were reviewed. I have discussed the diagnosis with the patient and the intended plan as seen in the above orders. The patient has received an after-visit summary and questions were answered concerning future plans. I have discussed medication side effects and warnings with the patient as well. Patient discussed with Dr. Jada Hartley, Attending Physician.     Phyllis Ruiz MD, PGY2  Family Medicine Resident

## 2017-08-22 NOTE — MR AVS SNAPSHOT
Visit Information Date & Time Provider Department Dept. Phone Encounter #  
 8/22/2017 11:00 AM Jay Levy, [facility] [phone] [id number] Upcoming Health Maintenance Date Due Hepatitis C Screening 1950 DTaP/Tdap/Td series (1 - Tdap) 8/3/1973 PAP AKA CERVICAL CYTOLOGY 8/3/1973 BREAST CANCER SCRN MAMMOGRAM 8/3/2002 FOBT Q 1 YEAR AGE 50-75 8/3/2002 ZOSTER VACCINE AGE 60> 6/3/2012 INFLUENZA AGE 9 TO ADULT 8/1/2017 GLAUCOMA SCREENING Q2Y 8/3/2017 OSTEOPOROSIS SCREENING (DEXA) 8/3/2017 Pneumococcal 65+ Low/Medium Risk (1 of 2 - PCV13) 8/3/2017 MEDICARE YEARLY EXAM 8/3/2017 Allergies as of 8/22/2017  Review Complete On: 8/22/2017 By: [place] No Known Allergies Current Immunizations  Reviewed on 9/15/2016 No immunizations on file. Not reviewed this visit You Were Diagnosed With   
  
 Codes Comments Essential hypertension     ICD-10-CM: I10 
ICD-9-CM: 401.9 Vitals BP Pulse Temp Resp Height(growth percentile) Weight(growth percentile) 146/88 (BP 1 Location: Left arm, BP Patient Position: Sitting) (!) 58 99.3 °F (37.4 °C) (Oral) 17 5' (1.524 m) 125 lb (56.7 kg) SpO2 BMI OB Status Smoking Status 98% 24.41 kg/m2 Menopause Former Smoker BMI and BSA Data Body Mass Index Body Surface Area  
 24.41 kg/m 2 1.55 m 2 Preferred Pharmacy Pharmacy Name Phone [facility] [address], [address] [phone] Your Updated Medication List  
  
   
This list is accurate as of: 8/22/17 12:12 PM.  Always use your most recent med list.  
  
  
  
  
 DULoxetine 30 mg capsule Commonly known as:  CYMBALTA Take 1 Cap by mouth daily. gabapentin 400 mg capsule Commonly known as:  NEURONTIN Take 1 Cap by mouth three (3) times daily for 30 days. Indications: fibromyalgia  
  
 metoprolol tartrate 75 mg Tab Take 75 mg by mouth two (2) times a day. Omeprazole delayed release 20 mg tablet Commonly known as:  PRILOSEC D/R Take 1 Tab by mouth daily. Indications: marginal ulcer, history of perforated ulcer in the past  
  
 QUEtiapine  mg sr tablet Commonly known as:  SEROquel XR Take 1 Tab by mouth nightly. sucralfate 1 gram tablet Commonly known as:  Fredda Yuni Take 1 Tab by mouth three (3) times daily. Prescriptions Sent to Pharmacy Refills  
 sucralfate (CARAFATE) 1 gram tablet 2 Sig: Take 1 Tab by mouth three (3) times daily. Class: Normal  
 Pharmacy: [address], [address] [place] [address] [address]. [address] [place] [address] Ph #: [phone] Route: Oral  
 Omeprazole delayed release (PRILOSEC D/R) 20 mg tablet 12 Sig: Take 1 Tab by mouth daily. Indications: marginal ulcer, history of perforated ulcer in the past  
 Class: Normal  
 Pharmacy: [address], [address] [place] [address] Rd. [address] [place] [address] Ph #: [phone] Route: Oral  
 metoprolol tartrate 75 mg tab 0 Sig: Take 75 mg by mouth two (2) times a day. Class: Normal  
 Pharmacy: [address], [address] [place] [address] [address]. [address] [place] [address] Ph #: [phone] Route: Oral  
 gabapentin (NEURONTIN) 400 mg capsule 0 Sig: Take 1 Cap by mouth three (3) times daily for 30 days. Indications: fibromyalgia Class: Normal  
 Pharmacy: [address], [address] [place] [address] Rd. [address] [facility] Ph #: [phone] Route: Oral  
  
Introducing [facility]! [address] introduces [employer] patient portal. Now you can access parts of your medical record, email your doctor's office, and request medication refills online. 1. In your internet browser, go to [email or web address] 2. Click on the First Time User? Click Here link in the Sign In box. You will see the New Member Sign Up page. 3. Enter your [employer] Access Code exactly as it appears below. You will not need to use this code after youve completed the sign-up process. If you do not sign up before the expiration date, you must request a new code. · [employer] Access Code: [facility] Expires: 9/22/2017  3:59 PM 
 
4. Enter the last four digits of your Social Security Number (xxxx) and Date of Birth (mm/dd/yyyy) as indicated and click Submit. You will be taken to the next sign-up page. 5. Create a [employer] ID. This will be your [employer] login ID and cannot be changed, so think of one that is secure and easy to remember. 6. Create a [employer] password. You can change your password at any time. 7. Enter your Password Reset Question and Answer. This can be used at a later time if you forget your password. 8. Enter your e-mail address. You will receive e-mail notification when new information is available in [facility] [address] 9. Click Sign Up. You can now view and download portions of your medical record. 10. Click the Download Summary menu link to download a portable copy of your medical information. If you have questions, please visit the Frequently Asked Questions section of the [employer] website. Remember, [employer] is NOT to be used for urgent needs. For medical emergencies, dial 911. Now available from your iPhone and Android! Please provide this summary of care documentation to your next provider. Your primary care clinician is listed as Ernie Nguyen. If you have any questions after today's visit, please call [phone].

## 2017-08-22 NOTE — PROGRESS NOTES
Chief Complaint   Patient presents with   [employer] Establish Care    Medication Refill     1. Have you been to the ER, urgent care clinic since your last visit? Hospitalized since your last visit? No    2. Have you seen or consulted any other health care providers outside of the [address] [place] [address] since your last visit? Include any pap smears or colon screening.  No

## 2017-09-11 ENCOUNTER — TELEPHONE (OUTPATIENT)
Dept: FAMILY MEDICINE CLINIC | Age: 67
End: 2017-09-11

## 2017-09-13 NOTE — TELEPHONE ENCOUNTER
Patient called to say she must have an acid reducer today and this has to be addressed. She has no money and cannot buy any OTC. Offered patient an appointment for today and it was accepted.

## 2017-09-14 RX ORDER — RANITIDINE 300 MG/1
300 TABLET ORAL
Qty: 30 TAB | Refills: 3 | Status: SHIPPED | OUTPATIENT
Start: 2017-09-14 | End: 2018-06-27

## 2017-09-21 RX ORDER — DULOXETIN HYDROCHLORIDE 30 MG/1
CAPSULE, DELAYED RELEASE ORAL
Qty: 30 CAP | Refills: 0 | Status: SHIPPED | OUTPATIENT
Start: 2017-09-21 | End: 2017-10-04 | Stop reason: SDUPTHER

## 2017-10-04 RX ORDER — DULOXETIN HYDROCHLORIDE 30 MG/1
CAPSULE, DELAYED RELEASE ORAL
Qty: 30 CAP | Refills: 0 | Status: SHIPPED | OUTPATIENT
Start: 2017-10-04 | End: 2017-12-22 | Stop reason: SDUPTHER

## 2017-10-27 ENCOUNTER — OFFICE VISIT (OUTPATIENT)
Dept: FAMILY MEDICINE CLINIC | Age: 67
End: 2017-10-27

## 2017-10-27 VITALS
WEIGHT: 132 LBS | SYSTOLIC BLOOD PRESSURE: 126 MMHG | BODY MASS INDEX: 25.91 KG/M2 | HEIGHT: 60 IN | HEART RATE: 71 BPM | TEMPERATURE: 99.1 F | RESPIRATION RATE: 18 BRPM | DIASTOLIC BLOOD PRESSURE: 80 MMHG | OXYGEN SATURATION: 99 %

## 2017-10-27 DIAGNOSIS — Z23 ENCOUNTER FOR IMMUNIZATION: ICD-10-CM

## 2017-10-27 DIAGNOSIS — F41.9 ANXIETY: ICD-10-CM

## 2017-10-27 DIAGNOSIS — I10 ESSENTIAL HYPERTENSION: Primary | ICD-10-CM

## 2017-10-27 RX ORDER — METOPROLOL TARTRATE 50 MG/1
75 TABLET ORAL 2 TIMES DAILY
Qty: 90 TAB | Refills: 1 | Status: SHIPPED | OUTPATIENT
Start: 2017-10-27 | End: 2018-01-14 | Stop reason: SDUPTHER

## 2017-10-27 NOTE — PATIENT INSTRUCTIONS
Learning About Alcohol Misuse  What is alcohol misuse? Alcohol misuse means drinking so much that it causes problems for you or others. Early problems with alcohol can start at home. You may argue with loved ones about how much you're drinking. Your job may be affected because of drinking. You may drink when it's dangerous or illegal, such as when you drive. Drinking too much for a long time can lead to health conditions like high blood pressure and liver problems. What are the symptoms? Symptoms of alcohol misuse may include:  · Drinking much more than you planned. · Drinking even though it's causing problems for you or others. · Putting yourself in situations where you might get hurt. · Wanting to cut down or stop drinking, but not being able to. · Feeling guilty about how much you're drinking. How is alcohol misuse treated? Getting help for problems with alcohol is up to you. But you don't have to do it alone. There are many people and kinds of treatments to help with alcohol problems. Talking to your doctor is the first step. When you get a doctor's help, treatment for alcohol problems can be safer and quicker. Treatment options can include:  · Treatment programs. Examples are group therapy, one or more types of counseling, and alcohol education. · Medicines. A doctor or counselor can help you know what kinds of medicines might help with cravings. · Free social support groups. These groups include AA (Alcoholics Anonymous) and SMART (Self-Management and Recovery Training). Your doctor can help you decide which type of program is best for you. Follow-up care is a key part of your treatment and safety. Be sure to make and go to all appointments, and call your doctor if you are having problems. It's also a good idea to know your test results and keep a list of the medicines you take. Where can you learn more? Go to http://betsy-adair.info/.   Enter [id number] in the search box to learn more about \"Learning About Alcohol Misuse. \"  Current as of: November 3, 2016  Content Version: 11.4  © 8772-4649 Healthwise, Incorporated. Care instructions adapted under license by [employer] (which disclaims liability or warranty for this information). If you have questions about a medical condition or this instruction, always ask your healthcare professional. [address] any warranty or liability for your use of this information.

## 2017-10-27 NOTE — MR AVS SNAPSHOT
Visit Information Date & Time Provider Department Dept. Phone Encounter #  
 10/27/2017  3:50 PM Joey Gardiner, [address] [facility] [phone] [id number] Upcoming Health Maintenance Date Due Hepatitis C Screening 1950 DTaP/Tdap/Td series (1 - Tdap) 8/3/1973 BREAST CANCER SCRN MAMMOGRAM 8/3/2002 FOBT Q 1 YEAR AGE 50-75 8/3/2002 ZOSTER VACCINE AGE 60> 6/3/2012 INFLUENZA AGE 9 TO ADULT 8/1/2017 GLAUCOMA SCREENING Q2Y 8/3/2017 OSTEOPOROSIS SCREENING (DEXA) 8/3/2017 Pneumococcal 65+ Low/Medium Risk (1 of 2 - PCV13) 8/3/2017 MEDICARE YEARLY EXAM 8/3/2017 Allergies as of 10/27/2017  Review Complete On: 8/22/2017 By: [place] No Known Allergies Current Immunizations  Reviewed on 9/15/2016 Name Date Influenza High Dose Vaccine PF 10/27/2017 Not reviewed this visit You Were Diagnosed With   
  
 Codes Comments Encounter for immunization    -  Primary ICD-10-CM: E87 ICD-9-CM: V03.89 Essential hypertension     ICD-10-CM: I10 
ICD-9-CM: 401.9 Vitals BP Pulse Temp Resp Height(growth percentile) Weight(growth percentile) 126/80 (BP 1 Location: Left arm, BP Patient Position: Sitting) 71 99.1 °F (37.3 °C) (Oral) 18 5' (1.524 m) 132 lb (59.9 kg) SpO2 BMI OB Status Smoking Status 99% 25.78 kg/m2 Menopause Former Smoker Vitals History BMI and BSA Data Body Mass Index Body Surface Area 25.78 kg/m 2 1.59 m 2 Preferred Pharmacy Pharmacy Name Phone [facility] [address], [address] [phone] Your Updated Medication List  
  
   
This list is accurate as of: 10/27/17  4:52 PM.  Always use your most recent med list.  
  
  
  
  
 * DULoxetine 30 mg capsule Commonly known as:  CYMBALTA Take 1 Cap by mouth daily. * DULoxetine 30 mg capsule Commonly known as:  CYMBALTA TAKE ONE CAPSULE BY MOUTH ONCE DAILY metoprolol tartrate 50 mg tablet Commonly known as:  LOPRESSOR Take 1.5 Tabs by mouth two (2) times a day. QUEtiapine  mg sr tablet Commonly known as:  SEROquel XR Take 1 Tab by mouth nightly. raNITIdine 300 mg tablet Commonly known as:  ZANTAC Take 1 Tab by mouth nightly. sucralfate 1 gram tablet Commonly known as:  Walker Cherrie Take 1 Tab by mouth three (3) times daily. * Notice: This list has 2 medication(s) that are the same as other medications prescribed for you. Read the directions carefully, and ask your doctor or other care provider to review them with you. Prescriptions Sent to Pharmacy Refills  
 metoprolol tartrate (LOPRESSOR) 50 mg tablet 1 Sig: Take 1.5 Tabs by mouth two (2) times a day. Class: Normal  
 Pharmacy: [address], [address] [facility] Ph #: [phone] Route: Oral  
  
We Performed the Following INFLUENZA VIRUS VACCINE, HIGH DOSE SEASONAL, PRESERVATIVE FREE [85115 CPT(R)] Patient Instructions Learning About Alcohol Misuse What is alcohol misuse? Alcohol misuse means drinking so much that it causes problems for you or others. Early problems with alcohol can start at home. You may argue with loved ones about how much you're drinking. Your job may be affected because of drinking. You may drink when it's dangerous or illegal, such as when you drive. Drinking too much for a long time can lead to health conditions like high blood pressure and liver problems. What are the symptoms? Symptoms of alcohol misuse may include: · Drinking much more than you planned. · Drinking even though it's causing problems for you or others. · Putting yourself in situations where you might get hurt. · Wanting to cut down or stop drinking, but not being able to. · Feeling guilty about how much you're drinking. How is alcohol misuse treated? Getting help for problems with alcohol is up to you. But you don't have to do it alone. There are many people and kinds of treatments to help with alcohol problems. Talking to your doctor is the first step. When you get a doctor's help, treatment for alcohol problems can be safer and quicker. Treatment options can include: · Treatment programs. Examples are group therapy, one or more types of counseling, and alcohol education. · Medicines. A doctor or counselor can help you know what kinds of medicines might help with cravings. · Free social support groups. These groups include AA (Alcoholics Anonymous) and SMART (Self-Management and Recovery Training). Your doctor can help you decide which type of program is best for you. Follow-up care is a key part of your treatment and safety. Be sure to make and go to all appointments, and call your doctor if you are having problems. It's also a good idea to know your test results and keep a list of the medicines you take. Where can you learn more? Go to http://betsy-adair.info/. Enter [phone] in the search box to learn more about \"Learning About Alcohol Misuse. \" Current as of: November 3, 2016 Content Version: 11.4 © 6928-5878 Healthwise, [employer]. Care instructions adapted under license by [employer] (which disclaims liability or warranty for this information). If you have questions about a medical condition or this instruction, always ask your healthcare professional. [place] [address] any warranty or liability for your use of this information. Introducing [facility]! Lashawn Eugene introduces [employer] patient portal. Now you can access parts of your medical record, email your doctor's office, and request medication refills online. 1. In your internet browser, go to [email or web address] 2. Click on the First Time User? Click Here link in the Sign In box.  You will see the New Member Sign Up page. 3. Enter your Browsarity Access Code exactly as it appears below. You will not need to use this code after youve completed the sign-up process. If you do not sign up before the expiration date, you must request a new code. · Browsarity Access Code: [email or web address] Expires: 1/25/2018  3:50 PM 
 
4. Enter the last four digits of your Social Security Number (xxxx) and Date of Birth (mm/dd/yyyy) as indicated and click Submit. You will be taken to the next sign-up page. 5. Create a Simplesurancet ID. This will be your Browsarity login ID and cannot be changed, so think of one that is secure and easy to remember. 6. Create a Browsarity password. You can change your password at any time. 7. Enter your Password Reset Question and Answer. This can be used at a later time if you forget your password. 8. Enter your e-mail address. You will receive e-mail notification when new information is available in [address] 9. Click Sign Up. You can now view and download portions of your medical record. 10. Click the Download Summary menu link to download a portable copy of your medical information. If you have questions, please visit the Frequently Asked Questions section of the Browsarity website. Remember, Browsarity is NOT to be used for urgent needs. For medical emergencies, dial 911. Now available from your iPhone and Android! Please provide this summary of care documentation to your next provider. Your primary care clinician is listed as [facility]. If you have any questions after today's visit, please call [phone].

## 2017-10-27 NOTE — PROGRESS NOTES
Here for two issues:  HTN -- 126/80 -- on metoprolol    Anxiety/depression/ETOH use    Follow up with psychiatry on 11/6    I reviewed with the resident the medical history and the resident's findings on the physical examination. I discussed with the resident the patient's diagnosis and concur with the plan.

## 2017-10-27 NOTE — PROGRESS NOTES
Chief Complaint   Patient presents with    Alcohol intoxication      states  told her to make sure she doesn't go into withdrawals     1. Have you been to the ER, urgent care clinic since your last visit? Hospitalized since your last visit? No    2. Have you seen or consulted any other health care providers outside of the [address] [place] [address] since your last visit? Include any pap smears or colon screening.  No

## 2017-10-27 NOTE — PROGRESS NOTES
Subjective:      Tai Rainey is a 74 y.o. female with history of hypertension, depression and anxiety  who presents for follow up. Issues discussed today       Hypertension   - Is currently on Metoprolol 150 mg/ daily   - Does measure BP - usually runs in the 281'R systolic. Does not recall the lower number. Does not bring in log. Diet - tries to eat a lot of vegetables   Exercise - Does not exercise regularly     Anxiety/Depression:   Is currently on Cymbalta 30 mg daily and Seroquel  mg   Have an appointment with Psychiatry on November 6th, NP Yasemin       Alcohol   - Drinks 40 oz of Bud Ice/day   - Has been drinking for 1.5 yrs. Reports she started drinking heavy after her son passed away. Patient would like to stop drinking. ROS:  General/Constitutional:   No headache, fever, fatigue,   Cardiac:    No chest pain      Respiratory:   No cough or shortness of breath     GI:   No nausea/vomiting, diarrhea   :   No dysuria or  hematuria    Neurological:   No loss of consciousness, dizziness, seizures,  Skin: No rash     PMHx:  Past Medical History:   Diagnosis Date    Anxiety     Anxiety     Blurred vision     Depression     Fibromyalgia     Gastrointestinal disorder     Hemorrhoids     History of bleeding peptic ulcer     with perforation. s/p repair 9/2016. 2nd bleed 6/2017    History of smoking     Hx of hepatitis C     pt states she no longer    Psychiatric disorder     PTSD (post-traumatic stress disorder)     Stress        Meds:   Current Outpatient Prescriptions   Medication Sig Dispense Refill    metoprolol tartrate (LOPRESSOR) 50 mg tablet Take 1.5 Tabs by mouth two (2) times a day. 90 Tab 1    DULoxetine (CYMBALTA) 30 mg capsule TAKE ONE CAPSULE BY MOUTH ONCE DAILY 30 Cap 0    raNITIdine (ZANTAC) 300 mg tablet Take 1 Tab by mouth nightly. 30 Tab 3    sucralfate (CARAFATE) 1 gram tablet Take 1 Tab by mouth three (3) times daily.  30 Tab 2    DULoxetine (CYMBALTA) 30 mg capsule Take 1 Cap by mouth daily. 30 Cap 0    QUEtiapine SR (SEROQUEL XR) 150 mg sr tablet Take 1 Tab by mouth nightly. 30 Tab 0       Allergies:   No Known Allergies    Smoker:  History   Smoking Status    Former Smoker    Packs/day: 0.25   Smokeless Tobacco    Never Used     Comment: quit 3 months ago       ETOH:   History   Alcohol Use No     Comment: quit drinking one month prior       FH: No family history on file. Objective:     Visit Vitals    /80 (BP 1 Location: Left arm, BP Patient Position: Sitting)    Pulse 71    Temp 99.1 °F (37.3 °C) (Oral)    Resp 18    Ht 5' (1.524 m)    Wt 132 lb (59.9 kg)    SpO2 99%    BMI 25.78 kg/m2       Physical Examination:   GEN: No apparent distress. Alert and oriented and responds to all questions appropriately. LUNGS: Respirations unlabored; clear to auscultation bilaterally  CARDIOVASCULAR: Regular, rate, and rhythm without murmurs, gallops or rubs   ABDOMEN: Soft; nontender; nondistended;  Psych:  Appropriate mood and affect. Dressed appropriately. EXT: Well perfused. No edema. SKIN: No obvious rashes. Assessment:     73 yo female who comes in for follow up of:     ICD-10-CM ICD-9-CM    1. Essential hypertension I10 401.9 metoprolol tartrate (LOPRESSOR) 50 mg tablet   2. Encounter for immunization Z23 V03.89 INFLUENZA VIRUS VACCINE, HIGH DOSE SEASONAL, PRESERVATIVE FREE   3.  Anxiety F41.9 300.00            Plan:         HTN   BP at goal today   Continue Metoprolol 75 mg BID  Counseled on DASH diet and exercise     Anxiety   Continue current regimen of Cymbalta and Seroquel   Follow up with Psychiatry as scheduled in November     Alcohol abuse   Counseled on risks of abuse   Instructed to attend AA meetings   Follow up with Psychiatry as scheduled        Signed By:  Alanna Valles MD    Family Medicine Resident

## 2017-11-09 RX ORDER — GABAPENTIN 400 MG/1
CAPSULE ORAL
Qty: 90 CAP | Refills: 0 | Status: SHIPPED | OUTPATIENT
Start: 2017-11-09 | End: 2017-12-19 | Stop reason: SDUPTHER

## 2017-11-29 ENCOUNTER — OFFICE VISIT (OUTPATIENT)
Dept: FAMILY MEDICINE CLINIC | Age: 67
End: 2017-11-29

## 2017-11-29 VITALS
HEART RATE: 74 BPM | SYSTOLIC BLOOD PRESSURE: 126 MMHG | DIASTOLIC BLOOD PRESSURE: 81 MMHG | WEIGHT: 133.2 LBS | TEMPERATURE: 98.1 F | HEIGHT: 60 IN | OXYGEN SATURATION: 98 % | RESPIRATION RATE: 20 BRPM | BODY MASS INDEX: 26.15 KG/M2

## 2017-11-29 DIAGNOSIS — R10.13 EPIGASTRIC PAIN: Primary | ICD-10-CM

## 2017-11-29 DIAGNOSIS — F60.3 BORDERLINE PERSONALITY DISORDER (HCC): ICD-10-CM

## 2017-11-29 DIAGNOSIS — F33.1 MODERATE EPISODE OF RECURRENT MAJOR DEPRESSIVE DISORDER (HCC): ICD-10-CM

## 2017-11-29 DIAGNOSIS — Z98.84 H/O GASTRIC BYPASS: ICD-10-CM

## 2017-11-29 DIAGNOSIS — Z87.11 HISTORY OF GASTRIC ULCER: ICD-10-CM

## 2017-11-29 DIAGNOSIS — F39 MOOD DISORDER (HCC): ICD-10-CM

## 2017-11-29 RX ORDER — QUETIAPINE 300 MG/1
300 TABLET, FILM COATED, EXTENDED RELEASE ORAL
Qty: 30 TAB | Refills: 0 | Status: SHIPPED | OUTPATIENT
Start: 2017-11-29 | End: 2018-01-14 | Stop reason: SDUPTHER

## 2017-11-29 NOTE — PROGRESS NOTES
Chief Complaint   Patient presents with    Abdominal Pain     x 4 days patient states ulcer have flared up     1. Have you been to the ER, urgent care clinic since your last visit? Hospitalized since your last visit? No    2. Have you seen or consulted any other health care providers outside of the [facility] since your last visit? Include any pap smears or colon screening.  No

## 2017-11-29 NOTE — PROGRESS NOTES
History of Present Illness:     Chief Complaint   Patient presents with    Abdominal Pain     x 4 days patient states ulcer have flared up     Pt is a 74y.o. year old female    She presents today with her [place] . Presents to clinic for upper abdominal pain that started 3 days ago. Associated nausea. Denies black stools, blood in stools, vomiting. She has been taking Ibuprofen 800mg BID for 3 days. Currently taking Nexium and Carafate daily as prescribed. Decreased PO intake but has been drinking liquids. Has not passed stool in 3 days but is passing gas. Hx of gastric bypass and gastric ulcers. She has never been hospitalized for bleeding ulcers. She does not have a GI doctor. Followed by Dr. Iram Harrell for surgical issues. Denies feeling pale, dizziness, light headed. No pallor or skin changes. Past Medical History:   Diagnosis Date    Anxiety     Anxiety     Blurred vision     Depression     Fibromyalgia     Gastrointestinal disorder     Hemorrhoids     History of bleeding peptic ulcer     with perforation. s/p repair 9/2016. 2nd bleed 6/2017    History of smoking     Hx of hepatitis C     pt states she no longer    Psychiatric disorder     PTSD (post-traumatic stress disorder)     Stress          Current Outpatient Prescriptions on File Prior to Visit   Medication Sig Dispense Refill    gabapentin (NEURONTIN) 400 mg capsule TAKE ONE CAPSULE BY MOUTH THREE TIMES DAILY FOR 30 DAYS. INDICATIONS:  FIBROMYALGIA 90 Cap 0    metoprolol tartrate (LOPRESSOR) 50 mg tablet Take 1.5 Tabs by mouth two (2) times a day. 90 Tab 1    DULoxetine (CYMBALTA) 30 mg capsule TAKE ONE CAPSULE BY MOUTH ONCE DAILY 30 Cap 0    raNITIdine (ZANTAC) 300 mg tablet Take 1 Tab by mouth nightly. 30 Tab 3    sucralfate (CARAFATE) 1 gram tablet Take 1 Tab by mouth three (3) times daily. 30 Tab 2    DULoxetine (CYMBALTA) 30 mg capsule Take 1 Cap by mouth daily.  30 Cap 0     No current facility-administered medications on file prior to visit. Allergies:  No Known Allergies      Review of Systems:  Denies fever, chills, sweats  Denies chest pain, MUSTAFA, palpitations, LE edema  Denies v/d, melena, blood in stool    Objective:     Vitals:    11/29/17 1512   BP: 126/81   Pulse: 74   Resp: 20   Temp: 98.1 °F (36.7 °C)   TempSrc: Oral   SpO2: 98%   Weight: 133 lb 3.2 oz (60.4 kg)   Height: 5' (1.524 m)       Physical Exam:  General appearance - alert, well appearing, and in no distress  Abdomen - Soft, non distended, normal bowel sounds present in all 4 quadrants, +scars c/w prior surgeries. +Epigastric and RUQ TTP with guarding on deep palpation. No rebound tenderness or rigidity. Recent Labs:  No results found for this or any previous visit (from the past 12 hour(s)). Assessment and Plan:   Pt is a 74y.o. year old female,      ICD-10-CM ICD-9-CM    1. Epigastric pain R10.13 789.06 CBC W/O DIFF      METABOLIC PANEL, COMPREHENSIVE      LIPASE      CANCELED: AMB POC HEMOGLOBIN (HGB)   2. H/O gastric bypass Z98.890 V45.86 CBC W/O DIFF   3. History of gastric ulcer Z87.19 V12.79 CBC W/O DIFF   4. Borderline personality disorder F60.3 301.83 QUEtiapine SR (SEROQUEL XR) 300 mg sr tablet   5. Moderate episode of recurrent major depressive disorder (HCC) F33.1 296.32 QUEtiapine SR (SEROQUEL XR) 300 mg sr tablet   6. Mood disorder (HCC) F39 296.90 QUEtiapine SR (SEROQUEL XR) 300 mg sr tablet     Check labs today     Increased Nexium to 40mg daily for next 2 weeks    Continue Carafate. NO NSAIDS!! Only Tylenol if needed for pain    Refused rectal exam for FOBT    Refilled med until she can see psychiatrist.     Follow up scheduled for 2 days from today with myself to re-evaluate abdominal pain. ED precautions: worsening abdominal pain, vomiting, diarrhea, blood in stool, dark/tarry stools, fever/ chills or other concerning symptoms go immediately to ED.      Best contact: [phone] Apurva Colvin MD      I have discussed the diagnosis with the patient and the intended plan as seen in the above orders. The patient has received an after-visit summary and questions were answered concerning future plans.

## 2017-11-29 NOTE — PATIENT INSTRUCTIONS
1. STOP ALL ANTI-INFLAMMATORY MEDICATIONS SUCH AS IBUPROFEN, NAPROXEN, ALEVE, MOTRIN. 2. Only use Tylenol if needed for the pain. 3. Take Nexium 40mg daily (take 2 of your 20 mg pills once daily) for the next 2 weeks. 4. Continue the Sucralfate    5. If you have vomiting, worsening belly pain, diarrhea, blood in stool or dark/ tarry stools.

## 2017-11-29 NOTE — MR AVS SNAPSHOT
Visit Information Date & Time Provider Department Dept. Phone Encounter #  
 11/29/2017  3:30 PM You Jeff, Latrell Rodriguez Faber [phone] [id number] Follow-up Instructions Return in about 2 days (around 12/1/2017) for Abdominal pain. Upcoming Health Maintenance Date Due Hepatitis C Screening 1950 DTaP/Tdap/Td series (1 - Tdap) 8/3/1973 BREAST CANCER SCRN MAMMOGRAM 8/3/2002 FOBT Q 1 YEAR AGE 50-75 8/3/2002 ZOSTER VACCINE AGE 60> 6/3/2012 GLAUCOMA SCREENING Q2Y 8/3/2017 OSTEOPOROSIS SCREENING (DEXA) 8/3/2017 Pneumococcal 65+ Low/Medium Risk (1 of 2 - PCV13) 8/3/2017 MEDICARE YEARLY EXAM 8/3/2017 Allergies as of 11/29/2017  Review Complete On: 11/29/2017 By: Aisha Getting No Known Allergies Current Immunizations  Reviewed on 9/15/2016 Name Date Influenza High Dose Vaccine PF 10/27/2017 Not reviewed this visit You Were Diagnosed With   
  
 Codes Comments Epigastric pain    -  Primary ICD-10-CM: R10.13 ICD-9-CM: 789.06   
 H/O gastric bypass     ICD-10-CM: Z98.890 ICD-9-CM: V45.86 History of gastric ulcer     ICD-10-CM: Z87.19 ICD-9-CM: V12.79 Borderline personality disorder     ICD-10-CM: F60.3 ICD-9-CM: 341.07 Moderate episode of recurrent major depressive disorder (HCC)     ICD-10-CM: F33.1 ICD-9-CM: 296.32 Mood disorder ([facility] [address]     ICD-10-CM: F39 
ICD-9-CM: 296.90 Vitals BP Pulse Temp Resp Height(growth percentile) Weight(growth percentile) 126/81 (BP 1 Location: Right arm, BP Patient Position: Sitting) 74 98.1 °F (36.7 °C) (Oral) 20 5' (1.524 m) 133 lb 3.2 oz (60.4 kg) SpO2 BMI OB Status Smoking Status 98% 26.01 kg/m2 Menopause Former Smoker Vitals History BMI and BSA Data Body Mass Index Body Surface Area 26.01 kg/m 2 1.6 m 2 Preferred Pharmacy Pharmacy Name Phone [facility] [address] [place] [address], [address] [facility] [address] [address] [phone] Your Updated Medication List  
  
   
This list is accurate as of: 11/29/17  4:08 PM.  Always use your most recent med list.  
  
  
  
  
 * DULoxetine 30 mg capsule Commonly known as:  CYMBALTA Take 1 Cap by mouth daily. * DULoxetine 30 mg capsule Commonly known as:  CYMBALTA TAKE ONE CAPSULE BY MOUTH ONCE DAILY  
  
 gabapentin 400 mg capsule Commonly known as:  NEURONTIN  
TAKE ONE CAPSULE BY MOUTH THREE TIMES DAILY FOR 30 DAYS. INDICATIONS:  FIBROMYALGIA  
  
 metoprolol tartrate 50 mg tablet Commonly known as:  LOPRESSOR Take 1.5 Tabs by mouth two (2) times a day. QUEtiapine  mg sr tablet Commonly known as:  SEROquel XR Take 1 Tab by mouth nightly. raNITIdine 300 mg tablet Commonly known as:  ZANTAC Take 1 Tab by mouth nightly. sucralfate 1 gram tablet Commonly known as:  Ressie Flavin Take 1 Tab by mouth three (3) times daily. * Notice: This list has 2 medication(s) that are the same as other medications prescribed for you. Read the directions carefully, and ask your doctor or other care provider to review them with you. Prescriptions Sent to Pharmacy Refills QUEtiapine SR (SEROQUEL XR) 300 mg sr tablet 0 Sig: Take 1 Tab by mouth nightly. Class: Normal  
 Pharmacy: [address], [address] [facility] Ph #: [phone] Route: Oral  
  
We Performed the Following CBC W/O DIFF [38107 CPT(R)] LIPASE L389053 CPT(R)] METABOLIC PANEL, COMPREHENSIVE [34794 CPT(R)] Follow-up Instructions Return in about 2 days (around 12/1/2017) for Abdominal pain. Patient Instructions 1. STOP ALL ANTI-INFLAMMATORY MEDICATIONS SUCH AS IBUPROFEN, NAPROXEN, ALEVE, MOTRIN. 2. Only use Tylenol if needed for the pain. 3. Take Nexium 40mg daily (take 2 of your 20 mg pills once daily) for the next 2 weeks. 4. Continue the Sucralfate 5. If you have vomiting, worsening belly pain, diarrhea, blood in stool or dark/ tarry stools. Introducing [facility]! [facility] introduces LOOKK patient portal. Now you can access parts of your medical record, email your doctor's office, and request medication refills online. 1. In your internet browser, go to [email or web address] 2. Click on the First Time User? Click Here link in the Sign In box. You will see the New Member Sign Up page. 3. Enter your LOOKK Access Code exactly as it appears below. You will not need to use this code after youve completed the sign-up process. If you do not sign up before the expiration date, you must request a new code. · LOOKK Access Code: [email or web address] Expires: 1/25/2018  2:50 PM 
 
4. Enter the last four digits of your Social Security Number (xxxx) and Date of Birth (mm/dd/yyyy) as indicated and click Submit. You will be taken to the next sign-up page. 5. Create a LOOKK ID. This will be your LOOKK login ID and cannot be changed, so think of one that is secure and easy to remember. 6. Create a LOOKK password. You can change your password at any time. 7. Enter your Password Reset Question and Answer. This can be used at a later time if you forget your password. 8. Enter your e-mail address. You will receive e-mail notification when new information is available in [address] 9. Click Sign Up. You can now view and download portions of your medical record. 10. Click the Download Summary menu link to download a portable copy of your medical information. If you have questions, please visit the Frequently Asked Questions section of the LOOKK website. Remember, LOOKK is NOT to be used for urgent needs. For medical emergencies, dial 911. Now available from your iPhone and Android! Please provide this summary of care documentation to your next provider. Your primary care clinician is listed as Lisa Saab. If you have any questions after today's visit, please call [phone].

## 2017-11-30 LAB
ALBUMIN SERPL-MCNC: 4.2 G/DL (ref 3.6–4.8)
ALBUMIN/GLOB SERPL: 1.6 {RATIO} (ref 1.2–2.2)
ALP SERPL-CCNC: 76 IU/L (ref 39–117)
ALT SERPL-CCNC: 9 IU/L (ref 0–32)
AST SERPL-CCNC: 20 IU/L (ref 0–40)
BILIRUB SERPL-MCNC: 0.2 MG/DL (ref 0–1.2)
BUN SERPL-MCNC: 18 MG/DL (ref 8–27)
BUN/CREAT SERPL: 21 (ref 12–28)
CALCIUM SERPL-MCNC: 9.6 MG/DL (ref 8.7–10.3)
CHLORIDE SERPL-SCNC: 100 MMOL/L (ref 96–106)
CO2 SERPL-SCNC: 27 MMOL/L (ref 18–29)
CREAT SERPL-MCNC: 0.87 MG/DL (ref 0.57–1)
ERYTHROCYTE [DISTWIDTH] IN BLOOD BY AUTOMATED COUNT: 16.2 % (ref 12.3–15.4)
GFR SERPLBLD CREATININE-BSD FMLA CKD-EPI: 70 ML/MIN/1.73
GFR SERPLBLD CREATININE-BSD FMLA CKD-EPI: 81 ML/MIN/1.73
GLOBULIN SER CALC-MCNC: 2.7 G/DL (ref 1.5–4.5)
GLUCOSE SERPL-MCNC: 95 MG/DL (ref 65–99)
HCT VFR BLD AUTO: 32.9 % (ref 34–46.6)
HGB BLD-MCNC: 10.3 G/DL (ref 11.1–15.9)
LIPASE SERPL-CCNC: 43 U/L (ref 14–72)
MCH RBC QN AUTO: 24.5 PG (ref 26.6–33)
MCHC RBC AUTO-ENTMCNC: 31.3 G/DL (ref 31.5–35.7)
MCV RBC AUTO: 78 FL (ref 79–97)
PLATELET # BLD AUTO: 373 X10E3/UL (ref 150–379)
POTASSIUM SERPL-SCNC: 4.3 MMOL/L (ref 3.5–5.2)
PROT SERPL-MCNC: 6.9 G/DL (ref 6–8.5)
RBC # BLD AUTO: 4.2 X10E6/UL (ref 3.77–5.28)
SODIUM SERPL-SCNC: 139 MMOL/L (ref 134–144)
WBC # BLD AUTO: 7.9 X10E3/UL (ref 3.4–10.8)

## 2017-11-30 NOTE — PROGRESS NOTES
Overall, labs unremarkable  Hgb is within her normal range of 10-11  LFTs and lipase normal  F/u scheduled for tomorrow

## 2017-12-01 ENCOUNTER — TELEPHONE (OUTPATIENT)
Dept: FAMILY MEDICINE CLINIC | Age: 67
End: 2017-12-01

## 2017-12-01 NOTE — TELEPHONE ENCOUNTER
Attempted to call patient to follow-up from office visit. LVM for patient to call the office if she is still having the same discomfort in her abdomen to schedule an appointment.

## 2017-12-19 DIAGNOSIS — F60.3 BORDERLINE PERSONALITY DISORDER (HCC): ICD-10-CM

## 2017-12-19 DIAGNOSIS — F33.1 MODERATE EPISODE OF RECURRENT MAJOR DEPRESSIVE DISORDER (HCC): ICD-10-CM

## 2017-12-22 RX ORDER — DULOXETIN HYDROCHLORIDE 30 MG/1
30 CAPSULE, DELAYED RELEASE ORAL DAILY
Qty: 30 CAP | Refills: 3 | Status: SHIPPED | OUTPATIENT
Start: 2017-12-22 | End: 2018-01-15 | Stop reason: SDUPTHER

## 2017-12-22 RX ORDER — GABAPENTIN 400 MG/1
400 CAPSULE ORAL
Qty: 60 CAP | Refills: 0 | Status: SHIPPED | OUTPATIENT
Start: 2017-12-22 | End: 2018-01-14 | Stop reason: SDUPTHER

## 2017-12-22 RX ORDER — GABAPENTIN 400 MG/1
400 CAPSULE ORAL
Qty: 60 CAP | Refills: 0 | Status: CANCELLED | OUTPATIENT
Start: 2017-12-22

## 2017-12-22 NOTE — TELEPHONE ENCOUNTER
Please respond to this request of 12/19. Also is requesting a 2nd medication now. Dr. Armas Prudent Refill  Received: Today       Monica RUTLEDGE [facility]                     Pt is requesting a refill on Cymbalta and \"Diapentin 400mg\".  Best Contact: [phone].

## 2018-01-14 DIAGNOSIS — I10 ESSENTIAL HYPERTENSION: ICD-10-CM

## 2018-01-14 DIAGNOSIS — F33.1 MODERATE EPISODE OF RECURRENT MAJOR DEPRESSIVE DISORDER (HCC): ICD-10-CM

## 2018-01-14 DIAGNOSIS — F60.3 BORDERLINE PERSONALITY DISORDER (HCC): ICD-10-CM

## 2018-01-14 DIAGNOSIS — F39 MOOD DISORDER (HCC): ICD-10-CM

## 2018-01-15 DIAGNOSIS — F33.1 MODERATE EPISODE OF RECURRENT MAJOR DEPRESSIVE DISORDER (HCC): ICD-10-CM

## 2018-01-15 DIAGNOSIS — F60.3 BORDERLINE PERSONALITY DISORDER (HCC): ICD-10-CM

## 2018-01-15 RX ORDER — METOPROLOL TARTRATE 50 MG/1
TABLET ORAL
Qty: 90 TAB | Refills: 1 | Status: SHIPPED | OUTPATIENT
Start: 2018-01-15 | End: 2018-03-01 | Stop reason: SDUPTHER

## 2018-01-15 RX ORDER — QUETIAPINE 300 MG/1
TABLET, FILM COATED, EXTENDED RELEASE ORAL
Qty: 30 TAB | Refills: 0 | Status: SHIPPED | OUTPATIENT
Start: 2018-01-15 | End: 2018-02-06 | Stop reason: SDUPTHER

## 2018-01-16 RX ORDER — DULOXETIN HYDROCHLORIDE 30 MG/1
30 CAPSULE, DELAYED RELEASE ORAL DAILY
Qty: 30 CAP | Refills: 3 | Status: SHIPPED | OUTPATIENT
Start: 2018-01-16 | End: 2018-06-27

## 2018-01-16 RX ORDER — GABAPENTIN 400 MG/1
CAPSULE ORAL
Qty: 60 CAP | Refills: 0 | Status: SHIPPED | OUTPATIENT
Start: 2018-01-16 | End: 2018-02-06 | Stop reason: SDUPTHER

## 2018-01-16 NOTE — TELEPHONE ENCOUNTER
Called and left voice mail. Thanks for the response Dr. Christopher Galvan. Patient Call  Received: Today       MD [address],     I refilled the medication as requested. Misael Beech you call and let the patient know? Alcira MANN

## 2018-01-16 NOTE — TELEPHONE ENCOUNTER
Dr. Martín Knutson  Received: Today       Kitty Rodriguez [facility] Front [facility]                     Pt is calling to check the status of the gabapentin. She stated she is using [facility] and if it's not approved today, it will be another week until she gets transportation. The best contact is [phone].

## 2018-02-06 DIAGNOSIS — F60.3 BORDERLINE PERSONALITY DISORDER (HCC): ICD-10-CM

## 2018-02-06 DIAGNOSIS — F33.1 MODERATE EPISODE OF RECURRENT MAJOR DEPRESSIVE DISORDER (HCC): ICD-10-CM

## 2018-02-06 DIAGNOSIS — F39 MOOD DISORDER (HCC): ICD-10-CM

## 2018-02-06 RX ORDER — GABAPENTIN 400 MG/1
CAPSULE ORAL
Qty: 60 CAP | Refills: 0 | Status: SHIPPED | OUTPATIENT
Start: 2018-02-06 | End: 2018-03-01 | Stop reason: SDUPTHER

## 2018-02-06 RX ORDER — QUETIAPINE 300 MG/1
TABLET, FILM COATED, EXTENDED RELEASE ORAL
Qty: 30 TAB | Refills: 0 | Status: SHIPPED | OUTPATIENT
Start: 2018-02-06 | End: 2018-06-27

## 2018-02-27 ENCOUNTER — TELEPHONE (OUTPATIENT)
Dept: FAMILY MEDICINE CLINIC | Age: 68
End: 2018-02-27

## 2018-02-27 ENCOUNTER — HOSPITAL ENCOUNTER (EMERGENCY)
Age: 68
Discharge: HOME | End: 2018-02-27
Attending: EMERGENCY MEDICINE
Payer: MEDICAID

## 2018-02-27 VITALS
SYSTOLIC BLOOD PRESSURE: 159 MMHG | OXYGEN SATURATION: 100 % | TEMPERATURE: 98.1 F | WEIGHT: 130.07 LBS | HEART RATE: 66 BPM | BODY MASS INDEX: 22.21 KG/M2 | DIASTOLIC BLOOD PRESSURE: 93 MMHG | HEIGHT: 64 IN | RESPIRATION RATE: 17 BRPM

## 2018-02-27 DIAGNOSIS — I10 ESSENTIAL HYPERTENSION: Primary | ICD-10-CM

## 2018-02-27 PROCEDURE — 99282 EMERGENCY DEPT VISIT SF MDM: CPT

## 2018-02-27 NOTE — DISCHARGE INSTRUCTIONS
Learning About High Blood Pressure  What is high blood pressure? Blood pressure is a measure of how hard the blood pushes against the walls of your arteries. It's normal for blood pressure to go up and down throughout the day, but if it stays up, you have high blood pressure. Another name for high blood pressure is hypertension. Two numbers tell you your blood pressure. The first number is the systolic pressure. It shows how hard the blood pushes when your heart is pumping. The second number is the diastolic pressure. It shows how hard the blood pushes between heartbeats, when your heart is relaxed and filling with blood. A blood pressure of less than 120/80 (say \"120 over 80\") is ideal for an adult. High blood pressure is 140/90 or higher. You have high blood pressure if your top number is 140 or higher or your bottom number is 90 or higher, or both. Many people fall into the category in between, called prehypertension. People with prehypertension need to make lifestyle changes to bring their blood pressure down and help prevent or delay high blood pressure. What happens when you have high blood pressure? · Blood flows through your arteries with too much force. Over time, this damages the walls of your arteries. But you can't feel it. High blood pressure usually doesn't cause symptoms. · Fat and calcium start to build up in your arteries. This buildup is called plaque. Plaque makes your arteries narrower and stiffer. Blood can't flow through them as easily. · This lack of good blood flow starts to damage some of the organs in your body. This can lead to problems such as coronary artery disease and heart attack, heart failure, stroke, kidney failure, and eye damage. How can you prevent high blood pressure? · Stay at a healthy weight. · Try to limit how much sodium you eat to less than 2,300 milligrams (mg) a day.  If you limit your sodium to 1,500 mg a day, you can lower your blood pressure even more.  ¨ Buy foods that are labeled \"unsalted,\" \"sodium-free,\" or \"low-sodium. \" Foods labeled \"reduced-sodium\" and \"light sodium\" may still have too much sodium. ¨ Flavor your food with garlic, lemon juice, onion, vinegar, herbs, and spices instead of salt. Do not use soy sauce, steak sauce, onion salt, garlic salt, mustard, or ketchup on your food. ¨ Use less salt (or none) when recipes call for it. You can often use half the salt a recipe calls for without losing flavor. · Be physically active. Get at least 30 minutes of exercise on most days of the week. Walking is a good choice. You also may want to do other activities, such as running, swimming, cycling, or playing tennis or team sports. · Limit alcohol to 2 drinks a day for men and 1 drink a day for women. · Eat plenty of fruits, vegetables, and low-fat dairy products. Eat less saturated and total fats. How is high blood pressure treated? · Your doctor will suggest making lifestyle changes. For example, your doctor may ask you to eat healthy foods, quit smoking, lose extra weight, and be more active. · If lifestyle changes don't help enough or your blood pressure is very high, you will have to take medicine every day. Follow-up care is a key part of your treatment and safety. Be sure to make and go to all appointments, and call your doctor if you are having problems. It's also a good idea to know your test results and keep a list of the medicines you take. Where can you learn more? Go to http://betsy-adair.info/. Enter P501 in the search box to learn more about \"Learning About High Blood Pressure. \"  Current as of: September 21, 2016  Content Version: 11.4  © 7002-7111 [employer]. Care instructions adapted under license by [employer] (which disclaims liability or warranty for this information).  If you have questions about a medical condition or this instruction, always ask your healthcare professional. [employer], [facility] disclaims any warranty or liability for your use of this information. DASH Diet: Care Instructions  Your Care Instructions    The DASH diet is an eating plan that can help lower your blood pressure. DASH stands for Dietary Approaches to Stop Hypertension. Hypertension is high blood pressure. The DASH diet focuses on eating foods that are high in calcium, potassium, and magnesium. These nutrients can lower blood pressure. The foods that are highest in these nutrients are fruits, vegetables, low-fat dairy products, nuts, seeds, and legumes. But taking calcium, potassium, and magnesium supplements instead of eating foods that are high in those nutrients does not have the same effect. The DASH diet also includes whole grains, fish, and poultry. The DASH diet is one of several lifestyle changes your doctor may recommend to lower your high blood pressure. Your doctor may also want you to decrease the amount of sodium in your diet. Lowering sodium while following the DASH diet can lower blood pressure even further than just the DASH diet alone. Follow-up care is a key part of your treatment and safety. Be sure to make and go to all appointments, and call your doctor if you are having problems. It's also a good idea to know your test results and keep a list of the medicines you take. How can you care for yourself at home? Following the DASH diet  · Eat 4 to 5 servings of fruit each day. A serving is 1 medium-sized piece of fruit, ½ cup chopped or canned fruit, 1/4 cup dried fruit, or 4 ounces (½ cup) of fruit juice. Choose fruit more often than fruit juice. · Eat 4 to 5 servings of vegetables each day. A serving is 1 cup of lettuce or raw leafy vegetables, ½ cup of chopped or cooked vegetables, or 4 ounces (½ cup) of vegetable juice. Choose vegetables more often than vegetable juice. · Get 2 to 3 servings of low-fat and fat-free dairy each day.  A serving is 8 ounces of milk, 1 cup of yogurt, or 1 ½ ounces of cheese. · Eat 6 to 8 servings of grains each day. A serving is 1 slice of bread, 1 ounce of dry cereal, or ½ cup of cooked rice, pasta, or cooked cereal. Try to choose whole-grain products as much as possible. · Limit lean meat, poultry, and fish to 2 servings each day. A serving is 3 ounces, about the size of a deck of cards. · Eat 4 to 5 servings of nuts, seeds, and legumes (cooked dried beans, lentils, and split peas) each week. A serving is 1/3 cup of nuts, 2 tablespoons of seeds, or ½ cup of cooked beans or peas. · Limit fats and oils to 2 to 3 servings each day. A serving is 1 teaspoon of vegetable oil or 2 tablespoons of salad dressing. · Limit sweets and added sugars to 5 servings or less a week. A serving is 1 tablespoon jelly or jam, ½ cup sorbet, or 1 cup of lemonade. · Eat less than 2,300 milligrams (mg) of sodium a day. If you limit your sodium to 1,500 mg a day, you can lower your blood pressure even more. Tips for success  · Start small. Do not try to make dramatic changes to your diet all at once. You might feel that you are missing out on your favorite foods and then be more likely to not follow the plan. Make small changes, and stick with them. Once those changes become habit, add a few more changes. · Try some of the following:  ¨ Make it a goal to eat a fruit or vegetable at every meal and at snacks. This will make it easy to get the recommended amount of fruits and vegetables each day. ¨ Try yogurt topped with fruit and nuts for a snack or healthy dessert. ¨ Add lettuce, tomato, cucumber, and onion to sandwiches. ¨ Combine a ready-made pizza crust with low-fat mozzarella cheese and lots of vegetable toppings. Try using tomatoes, squash, spinach, broccoli, carrots, cauliflower, and onions. ¨ Have a variety of cut-up vegetables with a low-fat dip as an appetizer instead of chips and dip. ¨ Sprinkle sunflower seeds or chopped almonds over salads.  Or try adding chopped walnuts or almonds to cooked vegetables. ¨ Try some vegetarian meals using beans and peas. Add garbanzo or kidney beans to salads. Make burritos and tacos with mashed waters beans or black beans. Where can you learn more? Go to http://betsy-adair.info/. Enter X974 in the search box to learn more about \"DASH Diet: Care Instructions. \"  Current as of: September 21, 2016  Content Version: 11.4  © 1169-9180 [employer]. Care instructions adapted under license by [employer] (which disclaims liability or warranty for this information). If you have questions about a medical condition or this instruction, always ask your healthcare professional. [address] any warranty or liability for your use of this information.

## 2018-02-27 NOTE — TELEPHONE ENCOUNTER
Luci called to make a lab appt. I informed her there were NO open lab orders. Said she had a lab order of her psychiatrist.  Cristian Bhtatiy her that provider is not at this office and no lab appt would be made. She then said now she wanted to be seen today and to cancel the appointment for Thursday. I cancelled the appt for Thursday for made an appt for today. She then said she could not come today and that appt was canceled and remade for Thursday per her.     JANEL

## 2018-02-27 NOTE — ED PROVIDER NOTES
HPI Comments: 74 y.o. female with past medical history significant for HTN and anxeity who presents from outpatient visit ambulatory with chief complaint of elevated BP. Pt reports that her BP has been elevated for the past few days despite her taking her Metoprolol TID. She had an outpatient psychiatric visit today and it was noted that her BP was elevated and she was advised to go to the ED for further evaluation and Tx. She denies any Sx, and specifically denies CP, SOB, HA, change in urination, vision changes. There are no other acute medical concerns at this time. PCP: Deborah Chin MD  Note written by claire Hinojosa, as dictated by Jose Jordan MD 2:03 PM        The history is provided by the patient. Past Medical History:   Diagnosis Date    Anxiety     Anxiety     Blurred vision     Depression     Fibromyalgia     Gastrointestinal disorder     Hemorrhoids     History of bleeding peptic ulcer     with perforation. s/p repair 9/2016. 2nd bleed 6/2017    History of smoking     Hx of hepatitis C     pt states she no longer    Psychiatric disorder     PTSD (post-traumatic stress disorder)     Stress        Past Surgical History:   Procedure Laterality Date    HX CHOLECYSTECTOMY      HX GASTRIC BYPASS      HX OTHER SURGICAL      PEG tube         Family History:   Problem Relation Age of Onset    No Known Problems Mother        Social History     Social History    Marital status: SINGLE     Spouse name: N/A    Number of children: N/A    Years of education: N/A     Occupational History    Not on file.      Social History Main Topics    Smoking status: Former Smoker     Packs/day: 0.25    Smokeless tobacco: Never Used      Comment: quit 3 months ago    Alcohol use No      Comment: quit drinking one month prior    Drug use: No    Sexual activity: Not on file     Other Topics Concern    Not on file     Social History Narrative         ALLERGIES: Review of patient's allergies indicates no known allergies. Review of Systems   Constitutional: Negative for chills and fever. HENT: Negative for ear pain and sore throat. Eyes: Negative for photophobia and pain. Respiratory: Negative for chest tightness and shortness of breath. Cardiovascular: Negative for chest pain and leg swelling. Gastrointestinal: Negative for abdominal pain, nausea and vomiting. Genitourinary: Negative for dysuria and flank pain. Musculoskeletal: Negative for back pain and neck pain. Skin: Negative for rash and wound. Neurological: Negative for dizziness, light-headedness and headaches. Vitals:    02/27/18 1352   BP: (!) 159/93   Pulse: 66   Resp: 17   Temp: 98.1 °F (36.7 °C)   SpO2: 100%   Weight: 59 kg (130 lb 1.1 oz)   Height: 5' 4\" (1.626 m)            Physical Exam   Constitutional: She is oriented to person, place, and time. She appears well-developed and well-nourished. HENT:   Head: Normocephalic and atraumatic. Cardiovascular: Normal rate, regular rhythm, normal heart sounds and intact distal pulses. No murmur heard. Pulmonary/Chest: Effort normal and breath sounds normal. No respiratory distress. She has no wheezes. Musculoskeletal: She exhibits no edema or deformity. Neurological: She is alert and oriented to person, place, and time. Nursing note and vitals reviewed. MDM  Number of Diagnoses or Management Options  Essential hypertension:   Diagnosis management comments: HTN - chronic - patient with no EMC at this time. Refer back to PCP for medication adjustments as needed.   Patient not taking her BP meds as directed, advised she may want to try that prior to seeing her PCP        ED Course       Procedures

## 2018-02-28 ENCOUNTER — TELEPHONE (OUTPATIENT)
Dept: SURGERY | Age: 68
End: 2018-02-28

## 2018-02-28 NOTE — TELEPHONE ENCOUNTER
Patient called office said she is is having nausea and pain in her abdomen. I asked patient when was the last time she smoked a cigarette, she replied she does not smoke anymore she just takes puffs off a cigarette. Also asked patient when was the last time she had a drink she replied last week. Informed patient just like Dr Jayda Caruso told her at her last visit from June 2017 she needs to stop drinking alcohol and stop smoking. Patient said she thinks she may be experiencing alcohol withdrawal. Patient has appointment tomorrow with her PCP.  Patient said she feels her stitches from her surgery back two years ago have not heeled, she said she will discuss this with her PCP during her visit tomorrow

## 2018-02-28 NOTE — TELEPHONE ENCOUNTER
I moved patient appointment. Patient thinks she is having small bowel problems again. Can not keep anything down. Only drinks water. And now has . That comes with her to her appointments,  [phone].

## 2018-03-01 ENCOUNTER — OFFICE VISIT (OUTPATIENT)
Dept: FAMILY MEDICINE CLINIC | Age: 68
End: 2018-03-01

## 2018-03-01 VITALS
OXYGEN SATURATION: 96 % | HEIGHT: 64 IN | SYSTOLIC BLOOD PRESSURE: 109 MMHG | HEART RATE: 65 BPM | TEMPERATURE: 98.3 F | WEIGHT: 130.07 LBS | DIASTOLIC BLOOD PRESSURE: 67 MMHG | RESPIRATION RATE: 18 BRPM | BODY MASS INDEX: 22.21 KG/M2

## 2018-03-01 DIAGNOSIS — I10 ESSENTIAL HYPERTENSION: Primary | ICD-10-CM

## 2018-03-01 DIAGNOSIS — F17.200 SMOKING: ICD-10-CM

## 2018-03-01 DIAGNOSIS — M79.7 FIBROMYALGIA: ICD-10-CM

## 2018-03-01 RX ORDER — METOPROLOL TARTRATE 75 MG/1
75 TABLET, FILM COATED ORAL 2 TIMES DAILY
Qty: 30 TAB | Refills: 1 | Status: SHIPPED | OUTPATIENT
Start: 2018-03-01 | End: 2018-05-07 | Stop reason: SDUPTHER

## 2018-03-01 RX ORDER — GABAPENTIN 400 MG/1
800 CAPSULE ORAL 3 TIMES DAILY
Qty: 180 CAP | Refills: 2 | Status: SHIPPED | OUTPATIENT
Start: 2018-03-01 | End: 2018-06-21 | Stop reason: SDUPTHER

## 2018-03-01 RX ORDER — NICOTINE 7MG/24HR
1 PATCH, TRANSDERMAL 24 HOURS TRANSDERMAL EVERY 24 HOURS
Qty: 30 PATCH | Refills: 0 | Status: SHIPPED | OUTPATIENT
Start: 2018-03-01 | End: 2018-03-31

## 2018-03-01 NOTE — PATIENT INSTRUCTIONS

## 2018-03-01 NOTE — PROGRESS NOTES
Chief Complaint   Patient presents with    Blood Pressure Check     1. Have you been to the ER, urgent care clinic since your last visit? Hospitalized since your last visit? Yes When: 2/27/18 Where: [facility] Reason for visit: blood pressure    2. Have you seen or consulted any other health care providers outside of the [address] [place] [address] since your last visit? Include any pap smears or colon screening. No      Patient feels she wants to stop smoking cigarettes but feels she will get depress and harm herself. She states she have stopped drinking 4 days ago.

## 2018-03-01 NOTE — MR AVS SNAPSHOT
[address] 
[phone] Patient: Bertram Arshad MRN: [id number] NMY:6/6/[birth year] Visit Information Date & Time Provider Department Dept. Phone Encounter #  
 3/1/2018  1:30 PM Jose Luis Puentes MD [address] [phone] [id number] Follow-up Instructions Return in about 4 weeks (around 3/29/2018) for Follow Up. Your Appointments 3/5/2018  2:45 PM  
ESTABLISHED PATIENT with Roberto Moreno MD  
[address] [facility] Surgery [address] [facility]) Appt Note: checkup. $0cp$0pb.fs; Not feeling well . will come. re/henri. appointment.$0cp$0pb.fs  
 [address] [facility] [address]  
[phone]  
  
   
 [address] Upcoming Health Maintenance Date Due Hepatitis C Screening 1950 DTaP/Tdap/Td series (1 - Tdap) 8/3/1973 BREAST CANCER SCRN MAMMOGRAM 8/3/2002 FOBT Q 1 YEAR AGE 50-75 8/3/2002 ZOSTER VACCINE AGE 60> 6/3/2012 GLAUCOMA SCREENING Q2Y 8/3/2017 OSTEOPOROSIS SCREENING (DEXA) 8/3/2017 MEDICARE YEARLY EXAM 8/3/2017 Pneumococcal 65+ Low/Medium Risk (1 of 2 - PCV13) 5/30/2018* *Topic was postponed. The date shown is not the original due date. Allergies as of 3/1/2018  Review Complete On: 3/1/2018 By: Gilda Sands No Known Allergies Current Immunizations  Reviewed on 9/15/2016 Name Date Influenza High Dose Vaccine PF 10/27/2017 Not reviewed this visit You Were Diagnosed With   
  
 Codes Comments Essential hypertension    -  Primary ICD-10-CM: I10 
ICD-9-CM: 401.9 Smoking     ICD-10-CM: F17.200 ICD-9-CM: 305.1 Fibromyalgia     ICD-10-CM: M79.7 ICD-9-CM: 729.1 Vitals BP Pulse Temp Resp Height(growth percentile) Weight(growth percentile)  109/67 (BP 1 Location: Right arm, BP Patient Position: Sitting) 65 98.3 °F (36.8 °C) (Oral) 18 5' 4\" (1.626 m) 130 lb 1.1 oz (59 kg) SpO2 BMI OB Status Smoking Status 96% 22.33 kg/m2 Menopause Former Smoker Vitals History BMI and BSA Data Body Mass Index Body Surface Area  
 22.33 kg/m 2 1.63 m 2 Preferred Pharmacy Pharmacy Name Phone [address], [address] [facility] [address] [phone] Your Updated Medication List  
  
   
This list is accurate as of 3/1/18  2:13 PM.  Always use your most recent med list.  
  
  
  
  
 DULoxetine 30 mg capsule Commonly known as:  CYMBALTA Take 1 Cap by mouth daily. gabapentin 400 mg capsule Commonly known as:  NEURONTIN Take 2 Caps by mouth three (3) times daily. metoprolol tartrate 75 mg Tab Take 75 mg by mouth two (2) times a day. nicotine 7 mg/24 hr  
Commonly known as:  NICODERM CQ  
1 Patch by TransDERmal route every twenty-four (24) hours for 30 days. QUEtiapine  mg sr tablet Commonly known as:  SEROquel XR  
TAKE ONE TABLET BY MOUTH ONCE DAILY AT  NIGHT  
  
 raNITIdine 300 mg tablet Commonly known as:  ZANTAC Take 1 Tab by mouth nightly. sucralfate 1 gram tablet Commonly known as:  [place] Carbon Take 1 Tab by mouth three (3) times daily. Prescriptions Sent to Pharmacy Refills  
 nicotine (NICODERM CQ) 7 mg/24 hr 0 Si Patch by TransDERmal route every twenty-four (24) hours for 30 days. Class: Normal  
 [facility] [address], [address] [facility] [address] Ph #: [phone] Route: TransDERmal  
 gabapentin (NEURONTIN) 400 mg capsule 2 Sig: Take 2 Caps by mouth three (3) times daily. Class: Normal  
 Pharmacy: [facility] [address] JT MATT [address], [facility] [facility] [address] [facility] [address] Ph #: [phone] Route: Oral  
 metoprolol tartrate 75 mg tab 1 Sig: Take 75 mg by mouth two (2) times a day.   
 Class: Normal  
 Pharmacy: [facility] DR JT MATT [address] [place] [address] Drive, [address]  #: [phone] Route: Oral  
  
Follow-up Instructions Return in about 4 weeks (around 3/29/2018) for Follow Up. Patient Instructions Low Sodium Diet (2,000 Milligram): Care Instructions Your Care Instructions Too much sodium causes your body to hold on to extra water. This can raise your blood pressure and force your heart and kidneys to work harder. In very serious cases, this could cause you to be put in the hospital. It might even be life-threatening. By limiting sodium, you will feel better and lower your risk of serious problems. The most common source of sodium is salt. People get most of the salt in their diet from canned, prepared, and packaged foods. Fast food and restaurant meals also are very high in sodium. Your doctor will probably limit your sodium to less than 2,000 milligrams (mg) a day. This limit counts all the sodium in prepared and packaged foods and any salt you add to your food. Follow-up care is a key part of your treatment and safety. Be sure to make and go to all appointments, and call your doctor if you are having problems. It's also a good idea to know your test results and keep a list of the medicines you take. How can you care for yourself at home? Read food labels · Read labels on cans and food packages. The labels tell you how much sodium is in each serving. Make sure that you look at the serving size. If you eat more than the serving size, you have eaten more sodium. · Food labels also tell you the Percent Daily Value for sodium. Choose products with low Percent Daily Values for sodium. · Be aware that sodium can come in forms other than salt, including monosodium glutamate (MSG), sodium citrate, and sodium bicarbonate (baking soda). MSG is often added to Asian food. When you eat out, you can sometimes ask for food without MSG or added salt. Buy low-sodium foods · Buy foods that are labeled \"unsalted\" (no salt added), \"sodium-free\" (less than 5 mg of sodium per serving), or \"low-sodium\" (less than 140 mg of sodium per serving). Foods labeled \"reduced-sodium\" and \"light sodium\" may still have too much sodium. Be sure to read the label to see how much sodium you are getting. · Buy fresh vegetables, or frozen vegetables without added sauces. Buy low-sodium versions of canned vegetables, soups, and other canned goods. Prepare low-sodium meals · Cut back on the amount of salt you use in cooking. This will help you adjust to the taste. Do not add salt after cooking. One teaspoon of salt has about 2,300 mg of sodium. · Take the salt shaker off the table. · Flavor your food with garlic, lemon juice, onion, vinegar, herbs, and spices. Do not use soy sauce, lite soy sauce, steak sauce, onion salt, garlic salt, celery salt, mustard, or ketchup on your food. · Use low-sodium salad dressings, sauces, and ketchup. Or make your own salad dressings and sauces without adding salt. · Use less salt (or none) when recipes call for it. You can often use half the salt a recipe calls for without losing flavor. Other foods such as rice, pasta, and grains do not need added salt. · Rinse canned vegetables, and cook them in fresh water. This removes some-but not all-of the salt. · Avoid water that is naturally high in sodium or that has been treated with water softeners, which add sodium. Call your local water company to find out the sodium content of your water supply. If you buy bottled water, read the label and choose a sodium-free brand. Avoid high-sodium foods · Avoid eating: ¨ Smoked, cured, salted, and canned meat, fish, and poultry. ¨ Ham, golden, hot dogs, and luncheon meats. ¨ Regular, hard, and processed cheese and regular peanut butter. ¨ Crackers with salted tops, and other salted snack foods such as pretzels, chips, and salted popcorn. ¨ Frozen prepared meals, unless labeled low-sodium. ¨ Canned and dried soups, broths, and bouillon, unless labeled sodium-free or low-sodium. ¨ Canned vegetables, unless labeled sodium-free or low-sodium. ¨ Western Kera fries, pizza, tacos, and other fast foods. ¨ Pickles, olives, ketchup, and other condiments, especially soy sauce, unless labeled sodium-free or low-sodium. Where can you learn more? Go to http://betsy-adair.info/. Enter J064 in the search box to learn more about \"Low Sodium Diet (2,000 Milligram): Care Instructions. \" Current as of: May 12, 2017 Content Version: 11.4 © 5012-7526 [employer]. Care instructions adapted under license by [employer] (which disclaims liability or warranty for this information). If you have questions about a medical condition or this instruction, always ask your healthcare professional. [place] [address] any warranty or liability for your use of this information. Introducing [facility]! David Simmons introduces [employer] patient portal. Now you can access parts of your medical record, email your doctor's office, and request medication refills online. 1. In your internet browser, go to [email or web address]/[email or web address] 2. Click on the First Time User? Click Here link in the Sign In box. You will see the New Member Sign Up page. 3. Enter your [employer] Access Code exactly as it appears below. You will not need to use this code after youve completed the sign-up process. If you do not sign up before the expiration date, you must request a new code. · [employer] Access Code: [email or web address] Expires: 5/28/2018  2:05 PM 
 
4. Enter the last four digits of your Social Security Number (xxxx) and Date of Birth (mm/dd/yyyy) as indicated and click Submit. You will be taken to the next sign-up page. 5. Create a [employer] ID.  This will be your [employer] login ID and cannot be changed, so think of one that is secure and easy to remember. 6. Create a [employer] password. You can change your password at any time. 7. Enter your Password Reset Question and Answer. This can be used at a later time if you forget your password. 8. Enter your e-mail address. You will receive e-mail notification when new information is available in [address] 9. Click Sign Up. You can now view and download portions of your medical record. 10. Click the Download Summary menu link to download a portable copy of your medical information. If you have questions, please visit the Frequently Asked Questions section of the [employer] website. Remember, [employer] is NOT to be used for urgent needs. For medical emergencies, dial 911. Now available from your iPhone and Android! Please provide this summary of care documentation to your next provider. Your primary care clinician is listed as Zaie Links. If you have any questions after today's visit, please call [phone].

## 2018-03-01 NOTE — PROGRESS NOTES
Assessment / Plan   Diagnoses and all orders for this visit:    1. Essential hypertension  Comments:  Right now BP on low side, would like pt to be consistent with taking meds and f/u in 4 weeks for possible decrease of BB. Orders:  -     metoprolol tartrate 75 mg tab; Take 75 mg by mouth two (2) times a day. 2. Smoking  Comments: While pt likely doesn't need a patch to quit from 2 cig / day, will support her desire to quit by prescribing small patch. Orders:  -     nicotine (NICODERM CQ) 7 mg/24 hr; 1 Patch by TransDERmal route every twenty-four (24) hours for 30 days. 3. Fibromyalgia  Comments:  Bumping up gabapentin. Orders:  -     gabapentin (NEURONTIN) 400 mg capsule; Take 2 Caps by mouth three (3) times daily. Follow-up Disposition:  Return in about 4 weeks (around 3/29/2018) for Follow Up. I have discussed the diagnosis with the patient and the intended plan as seen in the above orders. The patient has received an after-visit summary and questions were answered concerning future plans. I have discussed medication side effects and warnings with the patient as well. Samson Bernard MD  Family Medicine Resident       HPI     Chief Complaint   Patient presents with    Blood Pressure Check     She is a 74 y.o. female who presents for HTN, smoking cessation, and fibromyalgia. She states her HTN she had not been compliant and when her BP at home was 150/90 she felt alarmed and so went to the ER 2/27/18 then was told to f/u with her PCP. She does admit to taking it a bit irregularly, she is currently taking 50 mg Lopressor TID though. Denies dizziness upon standing, CP, palpitations. She notes her chronic pains aren't controlled at current gabapentin dose but she also notes it was better controlled in the past when she was taking 2 tabs TID. She reports smoking 2 cigarettes a day for the past several months and would like to quit but would like to use nicotine patches to help her quit. Review of Systems - No fevers, chills, nausea, vomiting, diarrhea, abdominal pain, SOB. Reviewed PmHx, RxHx, FmHx, SocHx, AllgHx and updated and dated in the chart. Physical Exam:  Visit Vitals    /67 (BP 1 Location: Right arm, BP Patient Position: Sitting)    Pulse 65    Temp 98.3 °F (36.8 °C) (Oral)    Resp 18    Ht 5' 4\" (1.626 m)    Wt 130 lb 1.1 oz (59 kg)    SpO2 96%    BMI 22.33 kg/m2     Physical Exam   Constitutional: She is oriented to person, place, and time. She appears well-developed and well-nourished. HENT:   Head: Normocephalic and atraumatic. Nose: Nose normal.   Eyes: Conjunctivae are normal. Right eye exhibits no discharge. Left eye exhibits no discharge. No scleral icterus. Cardiovascular: Normal rate, regular rhythm and normal heart sounds. Exam reveals no gallop and no friction rub. Pulmonary/Chest: Effort normal and breath sounds normal. No respiratory distress. Abdominal: Soft. Bowel sounds are normal. She exhibits no distension. There is no tenderness. Musculoskeletal: She exhibits no edema or tenderness. Neurological: She is alert and oriented to person, place, and time. Skin: Skin is warm and dry. No rash noted. She is not diaphoretic. No erythema. No pallor. Psychiatric: She has a normal mood and affect. Judgment normal.   Vitals reviewed.

## 2018-03-05 ENCOUNTER — TELEPHONE (OUTPATIENT)
Dept: FAMILY MEDICINE CLINIC | Age: 68
End: 2018-03-05

## 2018-03-05 NOTE — TELEPHONE ENCOUNTER
----- Message from Morgan Hudson sent at 3/5/2018 10:24 AM EST -----  Regarding: Dr. Hernadez/Telephone  Pt requesting for medication for the flu , headache and nauseas to be sent to [facility] DR JT MATT in pt's file. Pt stated she was just in Thursday 03/01/18 and now feels like she has the flu. Pt also stated she only take Tyenol for pain. Best contact number [phone].

## 2018-03-06 NOTE — PROGRESS NOTES
I reviewed with the resident the medical history and the resident's findings on the physical examination. I discussed with the resident the patient's diagnosis and concur with the plan.   Pt seen with Dr Franklin Holliday

## 2018-05-07 DIAGNOSIS — I10 ESSENTIAL HYPERTENSION: ICD-10-CM

## 2018-05-07 NOTE — TELEPHONE ENCOUNTER
Patient needs a refill of the following  Requested Prescriptions     Pending Prescriptions Disp Refills    metoprolol tartrate 75 mg tab 30 Tab 1     Sig: Take 75 mg by mouth two (2) times a day.

## 2018-05-08 RX ORDER — METOPROLOL TARTRATE 75 MG/1
75 TABLET, FILM COATED ORAL 2 TIMES DAILY
Qty: 60 TAB | Refills: 0 | Status: SHIPPED | OUTPATIENT
Start: 2018-05-08 | End: 2018-06-21 | Stop reason: SDUPTHER

## 2018-06-21 ENCOUNTER — TELEPHONE (OUTPATIENT)
Dept: FAMILY MEDICINE CLINIC | Age: 68
End: 2018-06-21

## 2018-06-21 NOTE — TELEPHONE ENCOUNTER
----- Message from Maryuri Navarro sent at 6/21/2018 11:50 AM EDT -----  Regarding: Dr. Maxx Walsh  Pt would like to be seen as soon as possible due to med f/up. Unable to schedule due to no appt available within pt's timeframe. Pt best contact number is [phone].

## 2018-06-21 NOTE — TELEPHONE ENCOUNTER
Called & talked with patient and scheduled her a June 27, 2018 appointment for [facility] & fibromyalgia follow up also will need medications refills.

## 2018-06-22 DIAGNOSIS — M79.7 FIBROMYALGIA: ICD-10-CM

## 2018-06-24 RX ORDER — GABAPENTIN 400 MG/1
800 CAPSULE ORAL 3 TIMES DAILY
Qty: 180 CAP | Refills: 0 | Status: SHIPPED | OUTPATIENT
Start: 2018-06-24 | End: 2018-06-27 | Stop reason: SDUPTHER

## 2018-06-27 ENCOUNTER — OFFICE VISIT (OUTPATIENT)
Dept: FAMILY MEDICINE CLINIC | Age: 68
End: 2018-06-27

## 2018-06-27 VITALS
SYSTOLIC BLOOD PRESSURE: 130 MMHG | BODY MASS INDEX: 22.36 KG/M2 | TEMPERATURE: 98.7 F | WEIGHT: 131 LBS | RESPIRATION RATE: 18 BRPM | HEIGHT: 64 IN | HEART RATE: 71 BPM | DIASTOLIC BLOOD PRESSURE: 87 MMHG

## 2018-06-27 DIAGNOSIS — Z12.39 BREAST CANCER SCREENING: ICD-10-CM

## 2018-06-27 DIAGNOSIS — F10.10 ALCOHOL ABUSE: ICD-10-CM

## 2018-06-27 DIAGNOSIS — M79.7 FIBROMYALGIA: ICD-10-CM

## 2018-06-27 DIAGNOSIS — I10 ESSENTIAL HYPERTENSION: Primary | ICD-10-CM

## 2018-06-27 DIAGNOSIS — Z13.220 SCREENING CHOLESTEROL LEVEL: ICD-10-CM

## 2018-06-27 DIAGNOSIS — Z78.0 MENOPAUSE: ICD-10-CM

## 2018-06-27 RX ORDER — GABAPENTIN 400 MG/1
800 CAPSULE ORAL 3 TIMES DAILY
Qty: 540 CAP | Refills: 3 | Status: SHIPPED | OUTPATIENT
Start: 2018-06-27 | End: 2018-09-14 | Stop reason: SDUPTHER

## 2018-06-27 RX ORDER — METOPROLOL TARTRATE 75 MG/1
75 TABLET, FILM COATED ORAL 2 TIMES DAILY
Qty: 180 TAB | Refills: 3 | Status: SHIPPED | OUTPATIENT
Start: 2018-06-27 | End: 2018-09-14 | Stop reason: SDUPTHER

## 2018-06-27 RX ORDER — DIVALPROEX SODIUM 250 MG/1
TABLET, EXTENDED RELEASE ORAL
COMMUNITY
End: 2018-11-01

## 2018-06-27 RX ORDER — HYDROGEN PEROXIDE 3 %
SOLUTION, NON-ORAL MISCELLANEOUS DAILY
COMMUNITY
End: 2018-07-20

## 2018-06-27 NOTE — MR AVS SNAPSHOT
[address] 
[phone] Patient: Paresh Gibson MRN: [id number] NPV:4/5/[birth year] Visit Information Date & Time Provider Department Dept. Phone Encounter #  
 6/27/2018 10:15 AM REE [place]/ Julián Huynh 77 [phone] Follow-up Instructions Return in about 6 months (around 12/27/2018) for blood pressure. Upcoming Health Maintenance Date Due Hepatitis C Screening 1950 DTaP/Tdap/Td series (1 - Tdap) 8/3/1973 BREAST CANCER SCRN MAMMOGRAM 8/3/2002 FOBT Q 1 YEAR AGE 50-75 8/3/2002 ZOSTER VACCINE AGE 60> 6/3/2012 GLAUCOMA SCREENING Q2Y 8/3/2017 Bone Densitometry (Dexa) Screening 8/3/2017 Pneumococcal 65+ Low/Medium Risk (1 of 2 - PCV13) 8/3/2017 MEDICARE YEARLY EXAM 3/14/2018 Influenza Age 5 to Adult 8/1/2018 Allergies as of 6/27/2018  Review Complete On: 6/27/2018 By: Julianne Wood MD  
 No Known Allergies Current Immunizations  Reviewed on 9/15/2016 Name Date Influenza High Dose Vaccine PF 10/27/2017 Not reviewed this visit You Were Diagnosed With   
  
 Codes Comments Essential hypertension    -  Primary ICD-10-CM: I10 
ICD-9-CM: 401.9 Right now BP on low side, would like pt to be consistent with taking meds and f/u in 4 weeks for possible decrease of BB. Fibromyalgia     ICD-10-CM: M79.7 ICD-9-CM: 729.1 Bumping up gabapentin. Alcohol abuse     ICD-10-CM: F10.10 ICD-9-CM: 305.00 Breast cancer screening     ICD-10-CM: Z12.31 
ICD-9-CM: V76.10 Screening cholesterol level     ICD-10-CM: B93.071 ICD-9-CM: V77.91 Menopause     ICD-10-CM: Z78.0 ICD-9-CM: 627.2 Vitals BP Pulse Temp Resp Height(growth percentile) Weight(growth percentile) 130/87 71 98.7 °F (37.1 °C) (Oral) 18 5' 4\" (1.626 m) 131 lb (59.4 kg) BMI OB Status Smoking Status 22.49 kg/m2 Menopause Former Smoker Vitals History BMI and BSA Data Body Mass Index Body Surface Area  
 22.49 kg/m 2 1.64 m 2 Preferred Pharmacy Pharmacy Name Phone [address], [address] [place] [address]. [address] [place] [address] [phone] Your Updated Medication List  
  
   
This list is accurate as of 6/27/18 11:38 AM.  Always use your most recent med list.  
  
  
  
  
 divalproex  mg ER tablet Commonly known as:  DEPAKOTE ER Take  by mouth.  
  
 gabapentin 400 mg capsule Commonly known as:  NEURONTIN Take 2 Caps by mouth three (3) times daily. metoprolol tartrate 75 mg Tab Take 75 mg by mouth two (2) times a day. NexIUM 20 mg capsule Generic drug:  esomeprazole Take  by mouth daily. senna leaf Tea Take  by mouth. Prescriptions Sent to Pharmacy Refills  
 metoprolol tartrate 75 mg tab 3 Sig: Take 75 mg by mouth two (2) times a day. Class: Normal  
 Pharmacy: [facility] [address], [address] [place] [address]. [address] [place] [address] Ph #: [phone] Route: Oral  
 gabapentin (NEURONTIN) 400 mg capsule 3 Sig: Take 2 Caps by mouth three (3) times daily. Class: Normal  
 Pharmacy: [address] [place] [address] [address], [address] [place] [address] Ph #: [phone] Route: Oral  
  
We Performed the Following CBC WITH AUTOMATED DIFF [25160 CPT(R)] LIPID PANEL [27836 CPT(R)] METABOLIC PANEL, COMPREHENSIVE [30415 CPT(R)] Follow-up Instructions Return in about 6 months (around 12/27/2018) for blood pressure. To-Do List   
 06/27/2018 Imaging:  DEXA BONE DENSITY STUDY AXIAL   
  
 06/27/2018 Imaging:  BABITA MAMMO BI SCREENING INCL CAD Referral Information Referral ID Referred By Referred To  
  
 [id number] Chidi Duran Not Available Visits Status Start Date End Date 1 New Request 6/27/18 6/27/19 If your referral has a status of pending review or denied, additional information will be sent to support the outcome of this decision. Introducing [facility]! Tiara Wilson introduces [employer] patient portal. Now you can access parts of your medical record, email your doctor's office, and request medication refills online. 1. In your internet browser, go to [email or web address] 2. Click on the First Time User? Click Here link in the Sign In box. You will see the New Member Sign Up page. 3. Enter your [employer] Access Code exactly as it appears below. You will not need to use this code after youve completed the sign-up process. If you do not sign up before the expiration date, you must request a new code. · [employer] Access Code: [email or web address] Expires: 9/25/2018 11:38 AM 
 
4. Enter the last four digits of your Social Security Number (xxxx) and Date of Birth (mm/dd/yyyy) as indicated and click Submit. You will be taken to the next sign-up page. 5. Create a [employer] ID. This will be your [employer] login ID and cannot be changed, so think of one that is secure and easy to remember. 6. Create a [employer] password. You can change your password at any time. 7. Enter your Password Reset Question and Answer. This can be used at a later time if you forget your password. 8. Enter your e-mail address. You will receive e-mail notification when new information is available in [address] 9. Click Sign Up. You can now view and download portions of your medical record. 10. Click the Download Summary menu link to download a portable copy of your medical information. If you have questions, please visit the Frequently Asked Questions section of the [employer] website. Remember, [employer] is NOT to be used for urgent needs. For medical emergencies, dial 911. Now available from your iPhone and Android! Please provide this summary of care documentation to your next provider. Your primary care clinician is listed as Katie Martell. If you have any questions after today's visit, please call [phone].

## 2018-06-27 NOTE — PROGRESS NOTES
Chief Complaint   Patient presents with    Hypertension     fibromyalgia f/u    Medication Refill     1. Have you been to the ER, urgent care clinic since your last visit? Yes Chip cut finger 05/2018 Hospitalized since your last visit? No    2. Have you seen or consulted any other health care providers outside of the [address] [place] [address] since your last visit? Include any pap smears or colon screening.  No

## 2018-06-27 NOTE — PROGRESS NOTES
HISTORY OF PRESENT ILLNESS  Viky Newell is a 74 y.o. female. HPI Comments: Viky Newell is here for follow up on her HTN and for a refill on her Neurontin that she takes for her fibromyalgia. It is working well. She went through alcohol rehab this spring as an inpatient. No further alcohol. Hypertension   The history is provided by the patient. This is a chronic problem. The current episode started more than 1 week ago. The problem occurs constantly. The problem has been gradually improving. Pertinent negatives include no chest pain, no abdominal pain, no headaches and no shortness of breath. Nothing aggravates the symptoms. The symptoms are relieved by medications. Treatments tried: metoprolol. The treatment provided significant relief. Review of Systems   Constitutional: Negative for weight loss. No weight gain   Eyes: Negative for blurred vision. Respiratory: Negative for shortness of breath. Cardiovascular: Negative for chest pain and leg swelling. Gastrointestinal: Negative for abdominal pain. Neurological: Positive for dizziness. Negative for sensory change, speech change, focal weakness and headaches. She gets dizzy when her blood sugar is low. Visit Vitals    /87    Pulse 71    Temp 98.7 °F (37.1 °C) (Oral)    Resp 18    Ht 5' 4\" (1.626 m)    Wt 131 lb (59.4 kg)    BMI 22.49 kg/m2     BP Readings from Last 3 Encounters:   06/27/18 130/87   03/01/18 109/67   02/27/18 (!) 159/93     Physical Exam   Constitutional: She is oriented to person, place, and time. She appears well-developed and well-nourished. No distress. Cardiovascular: Normal rate, regular rhythm and normal heart sounds. Exam reveals no gallop and no friction rub. No murmur heard. Pulmonary/Chest: Effort normal and breath sounds normal. No respiratory distress. She has no wheezes. She has no rales. Musculoskeletal: She exhibits no edema.    Neurological: She is alert and oriented to person, place, and time. Skin: Skin is warm and dry. She is not diaphoretic. Nursing note and vitals reviewed. ASSESSMENT and PLAN    ICD-10-CM ICD-9-CM    1. Essential hypertension I10 401.9 metoprolol tartrate 75 mg tab      METABOLIC PANEL, COMPREHENSIVE   2. Fibromyalgia M79.7 729.1 gabapentin (NEURONTIN) 400 mg capsule   3. Alcohol abuse M47.20 123.35 METABOLIC PANEL, COMPREHENSIVE      CBC WITH AUTOMATED DIFF   4. Breast cancer screening Z12.31 V76.10 BABITA MAMMO BI SCREENING INCL CAD   5. Screening cholesterol level Z13.220 V77.91 LIPID PANEL   6. Menopause Z78.0 627.2 DEXA BONE DENSITY STUDY AXIAL        Blood pressure controlled  Fibromyalgia doing well  Abstaining from alcohol  Labs per orders. Continue to abstain from alcohol  Mammogram  Bone density    Follow-up Disposition:  Return in about 6 months (around 12/27/2018) for blood pressure. Reviewed plan of care. Patient has provided input and agrees with goals.

## 2018-06-28 LAB
ALBUMIN SERPL-MCNC: 4 G/DL (ref 3.6–4.8)
ALBUMIN/GLOB SERPL: 1.5 {RATIO} (ref 1.2–2.2)
ALP SERPL-CCNC: 51 IU/L (ref 39–117)
ALT SERPL-CCNC: 15 IU/L (ref 0–32)
AST SERPL-CCNC: 32 IU/L (ref 0–40)
BASOPHILS # BLD AUTO: 0 X10E3/UL (ref 0–0.2)
BASOPHILS NFR BLD AUTO: 1 %
BILIRUB SERPL-MCNC: <0.2 MG/DL (ref 0–1.2)
BUN SERPL-MCNC: 17 MG/DL (ref 8–27)
BUN/CREAT SERPL: 25 (ref 12–28)
CALCIUM SERPL-MCNC: 9.3 MG/DL (ref 8.7–10.3)
CHLORIDE SERPL-SCNC: 106 MMOL/L (ref 96–106)
CHOLEST SERPL-MCNC: 152 MG/DL (ref 100–199)
CO2 SERPL-SCNC: 25 MMOL/L (ref 20–29)
CREAT SERPL-MCNC: 0.68 MG/DL (ref 0.57–1)
EOSINOPHIL # BLD AUTO: 0 X10E3/UL (ref 0–0.4)
EOSINOPHIL NFR BLD AUTO: 1 %
ERYTHROCYTE [DISTWIDTH] IN BLOOD BY AUTOMATED COUNT: 17.4 % (ref 12.3–15.4)
GLOBULIN SER CALC-MCNC: 2.6 G/DL (ref 1.5–4.5)
GLUCOSE SERPL-MCNC: 73 MG/DL (ref 65–99)
HCT VFR BLD AUTO: 33.2 % (ref 34–46.6)
HDLC SERPL-MCNC: 69 MG/DL
HGB BLD-MCNC: 10.2 G/DL (ref 11.1–15.9)
IMM GRANULOCYTES # BLD: 0 X10E3/UL (ref 0–0.1)
IMM GRANULOCYTES NFR BLD: 0 %
INTERPRETATION, 910389: NORMAL
LDLC SERPL CALC-MCNC: 62 MG/DL (ref 0–99)
LYMPHOCYTES # BLD AUTO: 0.9 X10E3/UL (ref 0.7–3.1)
LYMPHOCYTES NFR BLD AUTO: 30 %
MCH RBC QN AUTO: 23.7 PG (ref 26.6–33)
MCHC RBC AUTO-ENTMCNC: 30.7 G/DL (ref 31.5–35.7)
MCV RBC AUTO: 77 FL (ref 79–97)
MONOCYTES # BLD AUTO: 0.3 X10E3/UL (ref 0.1–0.9)
MONOCYTES NFR BLD AUTO: 12 %
NEUTROPHILS # BLD AUTO: 1.6 X10E3/UL (ref 1.4–7)
NEUTROPHILS NFR BLD AUTO: 56 %
PLATELET # BLD AUTO: 165 X10E3/UL (ref 150–379)
POTASSIUM SERPL-SCNC: 4.5 MMOL/L (ref 3.5–5.2)
PROT SERPL-MCNC: 6.6 G/DL (ref 6–8.5)
RBC # BLD AUTO: 4.3 X10E6/UL (ref 3.77–5.28)
SODIUM SERPL-SCNC: 141 MMOL/L (ref 134–144)
TRIGL SERPL-MCNC: 104 MG/DL (ref 0–149)
VLDLC SERPL CALC-MCNC: 21 MG/DL (ref 5–40)
WBC # BLD AUTO: 2.9 X10E3/UL (ref 3.4–10.8)

## 2018-07-20 ENCOUNTER — HOSPITAL ENCOUNTER (EMERGENCY)
Age: 68
Discharge: HOME | End: 2018-07-20
Attending: EMERGENCY MEDICINE
Payer: MEDICAID

## 2018-07-20 ENCOUNTER — APPOINTMENT (OUTPATIENT)
Dept: CT IMAGING | Age: 68
End: 2018-07-20
Attending: EMERGENCY MEDICINE
Payer: MEDICAID

## 2018-07-20 VITALS
OXYGEN SATURATION: 96 % | RESPIRATION RATE: 15 BRPM | HEART RATE: 67 BPM | DIASTOLIC BLOOD PRESSURE: 68 MMHG | HEIGHT: 64 IN | BODY MASS INDEX: 23.05 KG/M2 | SYSTOLIC BLOOD PRESSURE: 124 MMHG | WEIGHT: 135 LBS | TEMPERATURE: 98.8 F

## 2018-07-20 DIAGNOSIS — R10.13 ABDOMINAL PAIN, EPIGASTRIC: Primary | ICD-10-CM

## 2018-07-20 LAB
ALBUMIN SERPL-MCNC: 3.3 G/DL (ref 3.5–5)
ALBUMIN/GLOB SERPL: 0.8 {RATIO} (ref 1.1–2.2)
ALP SERPL-CCNC: 55 U/L (ref 45–117)
ALT SERPL-CCNC: 25 U/L (ref 12–78)
ANION GAP SERPL CALC-SCNC: 5 MMOL/L (ref 5–15)
AST SERPL-CCNC: 25 U/L (ref 15–37)
BASOPHILS # BLD: 0 K/UL (ref 0–0.1)
BASOPHILS NFR BLD: 1 % (ref 0–1)
BILIRUB SERPL-MCNC: 0.3 MG/DL (ref 0.2–1)
BUN SERPL-MCNC: 14 MG/DL (ref 6–20)
BUN/CREAT SERPL: 20 (ref 12–20)
CALCIUM SERPL-MCNC: 9 MG/DL (ref 8.5–10.1)
CHLORIDE SERPL-SCNC: 106 MMOL/L (ref 97–108)
CO2 SERPL-SCNC: 30 MMOL/L (ref 21–32)
CREAT SERPL-MCNC: 0.69 MG/DL (ref 0.55–1.02)
DIFFERENTIAL METHOD BLD: ABNORMAL
EOSINOPHIL # BLD: 0 K/UL (ref 0–0.4)
EOSINOPHIL NFR BLD: 1 % (ref 0–7)
ERYTHROCYTE [DISTWIDTH] IN BLOOD BY AUTOMATED COUNT: 16.6 % (ref 11.5–14.5)
GLOBULIN SER CALC-MCNC: 4 G/DL (ref 2–4)
GLUCOSE SERPL-MCNC: 106 MG/DL (ref 65–100)
HCT VFR BLD AUTO: 32.5 % (ref 35–47)
HEMOCCULT STL QL: NEGATIVE
HGB BLD-MCNC: 10.1 G/DL (ref 11.5–16)
IMM GRANULOCYTES # BLD: 0 K/UL (ref 0–0.04)
IMM GRANULOCYTES NFR BLD AUTO: 0 % (ref 0–0.5)
LIPASE SERPL-CCNC: 182 U/L (ref 73–393)
LYMPHOCYTES # BLD: 1.8 K/UL (ref 0.8–3.5)
LYMPHOCYTES NFR BLD: 48 % (ref 12–49)
MCH RBC QN AUTO: 23.8 PG (ref 26–34)
MCHC RBC AUTO-ENTMCNC: 31.1 G/DL (ref 30–36.5)
MCV RBC AUTO: 76.5 FL (ref 80–99)
MONOCYTES # BLD: 0.3 K/UL (ref 0–1)
MONOCYTES NFR BLD: 8 % (ref 5–13)
NEUTS SEG # BLD: 1.6 K/UL (ref 1.8–8)
NEUTS SEG NFR BLD: 42 % (ref 32–75)
NRBC # BLD: 0 K/UL (ref 0–0.01)
NRBC BLD-RTO: 0 PER 100 WBC
PLATELET # BLD AUTO: 223 K/UL (ref 150–400)
PMV BLD AUTO: 9 FL (ref 8.9–12.9)
POTASSIUM SERPL-SCNC: 3.4 MMOL/L (ref 3.5–5.1)
PROT SERPL-MCNC: 7.3 G/DL (ref 6.4–8.2)
RBC # BLD AUTO: 4.25 M/UL (ref 3.8–5.2)
SODIUM SERPL-SCNC: 141 MMOL/L (ref 136–145)
TROPONIN I SERPL-MCNC: <0.05 NG/ML
WBC # BLD AUTO: 3.7 K/UL (ref 3.6–11)

## 2018-07-20 PROCEDURE — 36415 COLL VENOUS BLD VENIPUNCTURE: CPT | Performed by: EMERGENCY MEDICINE

## 2018-07-20 PROCEDURE — 74011000250 HC RX REV CODE- 250: Performed by: EMERGENCY MEDICINE

## 2018-07-20 PROCEDURE — 84484 ASSAY OF TROPONIN QUANT: CPT | Performed by: EMERGENCY MEDICINE

## 2018-07-20 PROCEDURE — 74011636320 HC RX REV CODE- 636/320: Performed by: RADIOLOGY

## 2018-07-20 PROCEDURE — 96376 TX/PRO/DX INJ SAME DRUG ADON: CPT

## 2018-07-20 PROCEDURE — 82272 OCCULT BLD FECES 1-3 TESTS: CPT | Performed by: EMERGENCY MEDICINE

## 2018-07-20 PROCEDURE — 80053 COMPREHEN METABOLIC PANEL: CPT | Performed by: EMERGENCY MEDICINE

## 2018-07-20 PROCEDURE — 96374 THER/PROPH/DIAG INJ IV PUSH: CPT

## 2018-07-20 PROCEDURE — 74011250637 HC RX REV CODE- 250/637: Performed by: EMERGENCY MEDICINE

## 2018-07-20 PROCEDURE — 85025 COMPLETE CBC W/AUTO DIFF WBC: CPT | Performed by: EMERGENCY MEDICINE

## 2018-07-20 PROCEDURE — 96375 TX/PRO/DX INJ NEW DRUG ADDON: CPT

## 2018-07-20 PROCEDURE — 83690 ASSAY OF LIPASE: CPT | Performed by: EMERGENCY MEDICINE

## 2018-07-20 PROCEDURE — 74011250636 HC RX REV CODE- 250/636: Performed by: EMERGENCY MEDICINE

## 2018-07-20 PROCEDURE — 99285 EMERGENCY DEPT VISIT HI MDM: CPT

## 2018-07-20 PROCEDURE — 93005 ELECTROCARDIOGRAM TRACING: CPT

## 2018-07-20 PROCEDURE — 74177 CT ABD & PELVIS W/CONTRAST: CPT

## 2018-07-20 RX ORDER — ESOMEPRAZOLE MAGNESIUM 40 MG/1
40 CAPSULE, DELAYED RELEASE ORAL DAILY
Qty: 30 CAP | Refills: 0 | Status: SHIPPED | OUTPATIENT
Start: 2018-07-20 | End: 2018-09-14 | Stop reason: SDUPTHER

## 2018-07-20 RX ORDER — MORPHINE SULFATE 4 MG/ML
4 INJECTION INTRAVENOUS
Status: DISCONTINUED | OUTPATIENT
Start: 2018-07-20 | End: 2018-07-20

## 2018-07-20 RX ORDER — ONDANSETRON 2 MG/ML
4 INJECTION INTRAMUSCULAR; INTRAVENOUS
Status: COMPLETED | OUTPATIENT
Start: 2018-07-20 | End: 2018-07-20

## 2018-07-20 RX ORDER — FAMOTIDINE 10 MG/ML
20 INJECTION INTRAVENOUS
Status: COMPLETED | OUTPATIENT
Start: 2018-07-20 | End: 2018-07-20

## 2018-07-20 RX ADMIN — LIDOCAINE HYDROCHLORIDE 40 ML: 20 SOLUTION ORAL; TOPICAL at 11:46

## 2018-07-20 RX ADMIN — FAMOTIDINE 20 MG: 10 INJECTION, SOLUTION INTRAVENOUS at 11:44

## 2018-07-20 RX ADMIN — IOPAMIDOL 100 ML: 755 INJECTION, SOLUTION INTRAVENOUS at 14:09

## 2018-07-20 RX ADMIN — ONDANSETRON 4 MG: 2 INJECTION, SOLUTION INTRAMUSCULAR; INTRAVENOUS at 13:46

## 2018-07-20 RX ADMIN — ONDANSETRON 4 MG: 2 INJECTION, SOLUTION INTRAMUSCULAR; INTRAVENOUS at 11:42

## 2018-07-20 NOTE — ED NOTES
Pt unable to get in touch with anyone for a ride home, morphine held. Provider notified. Pt does not want Tylenol. This RN tried to call the phone number to get in touch with someone to provide ride home, line disconnected.

## 2018-07-20 NOTE — DISCHARGE INSTRUCTIONS
Indigestion (Dyspepsia or Heartburn): Care Instructions  Your Care Instructions  Sometimes it can be hard to pinpoint the cause of indigestion. (It is also called dyspepsia or heartburn.) Most cases of an upset stomach with bloating, burning, burping, and nausea are minor and go away within several hours. Home treatment and over-the-counter medicine often are able to control symptoms. But if you take medicine to relieve your indigestion without making diet and lifestyle changes, your symptoms are likely to return again and again. If you get indigestion often, it may be a sign of a more serious medical problem. Be sure to follow up with your doctor, who may want to do tests to be sure of the cause of your indigestion. Follow-up care is a key part of your treatment and safety. Be sure to make and go to all appointments, and call your doctor if you are having problems. It's also a good idea to know your test results and keep a list of the medicines you take. How can you care for yourself at home? · Your doctor may recommend over-the-counter medicine. For mild or occasional indigestion, antacids such as Gaviscon, Mylanta, Maalox, or Tums, may help. Be safe with medicines. Be careful when you take over-the-counter antacid medicines. Many of these medicines have aspirin in them. Read the label to make sure that you are not taking more than the recommended dose. Too much aspirin can be harmful. · Your doctor also may recommend over-the-counter acid reducers, such as Pepcid AC, Tagamet HB, Zantac 75, or Prilosec. Read and follow all instructions on the label. If you use these medicines often, talk with your doctor. · Change your eating habits. ¨ It's best to eat several small meals instead of two or three large meals. ¨ After you eat, wait 2 to 3 hours before you lie down. ¨ Chocolate, mint, and alcohol can make GERD worse.   ¨ Spicy foods, foods that have a lot of acid (like tomatoes and oranges), and coffee can make GERD symptoms worse in some people. If your symptoms are worse after you eat a certain food, you may want to stop eating that food to see if your symptoms get better. · Do not smoke or chew tobacco. Smoking can make GERD worse. If you need help quitting, talk to your doctor about stop-smoking programs and medicines. These can increase your chances of quitting for good. · If you have GERD symptoms at night, raise the head of your bed 6 to 8 inches. You can do this by putting the frame on blocks or placing a foam wedge under the head of your mattress. (Adding extra pillows does not work.)  · Do not wear tight clothing around your middle. · Lose weight if you need to. Losing just 5 to 10 pounds can help. · Do not take anti-inflammatory medicines, such as aspirin, ibuprofen (Advil, Motrin), or naproxen (Aleve). These can irritate the stomach. If you need a pain medicine, try acetaminophen (Tylenol), which does not cause stomach upset. When should you call for help? Call your doctor now or seek immediate medical care if:    · You have new or worse belly pain.     · You are vomiting.    Watch closely for changes in your health, and be sure to contact your doctor if:    · You have new or worse symptoms of indigestion.     · You have trouble or pain swallowing.     · You are losing weight.     · You do not get better as expected. Where can you learn more? Go to http://betsy-adair.info/. Enter P420 in the search box to learn more about \"Indigestion (Dyspepsia or Heartburn): Care Instructions. \"  Current as of: May 12, 2017  Content Version: 11.7  © 0286-2208 [employer]. Care instructions adapted under license by [employer] (which disclaims liability or warranty for this information).  If you have questions about a medical condition or this instruction, always ask your healthcare professional. [address] any warranty or liability for your use of this information.

## 2018-07-20 NOTE — PROGRESS NOTES
7/20/2018   Patient was discharged and needed a cab voucher  Called and scheduled   Etta Morrell      ,    2:12 PM  Case Management note    Met with patient to discuss discharge planning. Patient lives in a home with a friend and her granddaughter. It is a single story home with 4 steps to enter. Patient stated she used to drive but has not been lately. She does some cooking, and preforms all ADL's independently. She has no DME and has used Home Health some years ago and can't recall name. She has no Advance Directive on File. She states she quit drinking some years ago. She follows with Dr. Kaylie Heredia and gets Rx at [facility]. Reason for Admission:   Abdominal pain                   RRAT Score:          10           Plan for utilizing home health:      Unable to determine at this time                    Likelihood of Readmission:  Low/green                         Transition of Care Plan:                 Unable to determine at this time    Care Management Interventions  PCP Verified by CM: No  Mode of Transport at Discharge: Self  Transition of Care Consult (CM Consult): Discharge Planning  Current Support Network:  Other (lives in home with friends, daughter and grandaughter)  Confirm Follow Up Transport: Friends  Plan discussed with Pt/Family/Caregiver: Yes  Discharge Location  Discharge Placement: Unable to determine at this time  [address]

## 2018-07-20 NOTE — ED TRIAGE NOTES
Abdominal pain for 3 3 days. Hx Gastric Bypass and repair. Pain is 10/10 radiating to both sides. Is unable to stay still in triage. Arrived via EMS> Awake and oriented x 3. Skin is warm and dry, respirations are even and unlabored.

## 2018-07-20 NOTE — ED PROVIDER NOTES
Patient is a 74 y.o. female presenting with abdominal pain. The history is provided by the patient. Abdominal Pain This is a new problem. Episode onset: 3 days. The problem occurs constantly. The problem has been gradually worsening. The pain is located in the epigastric region and RUQ. The pain is at a severity of 10/10. Associated symptoms include nausea and constipation. Pertinent negatives include no fever, no diarrhea, no vomiting, no dysuria, no frequency, no chest pain and no back pain. The patient's surgical history includes cholecystectomy and gastric bypass. Past Medical History:  
Diagnosis Date  Anxiety  Anxiety  Blurred vision  Depression  Fibromyalgia  Gastrointestinal disorder  Hemorrhoids  History of bleeding peptic ulcer   
 with perforation. s/p repair 9/2016. 2nd bleed 6/2017  History of smoking  Hx of hepatitis C   
 pt states she no longer  Psychiatric disorder  PTSD (post-traumatic stress disorder)  Stress Past Surgical History:  
Procedure Laterality Date  HX CHOLECYSTECTOMY  HX GASTRIC BYPASS  HX OTHER SURGICAL    
 PEG tube Family History:  
Problem Relation Age of Onset  No Known Problems Mother Social History Social History  Marital status: SINGLE Spouse name: N/A  
 Number of children: N/A  
 Years of education: N/A Occupational History  Not on file. Social History Main Topics  Smoking status: Former Smoker Packs/day: 0.25  Smokeless tobacco: Never Used Comment: quit 3 months ago  Alcohol use No  
   Comment: quit drinking one month prior  Drug use: No  
 Sexual activity: Not on file Other Topics Concern  Not on file Social History Narrative ALLERGIES: Review of patient's allergies indicates no known allergies. Review of Systems Constitutional: Negative for chills and fever.   
Respiratory: Negative for chest tightness and shortness of breath. Cardiovascular: Negative for chest pain. Gastrointestinal: Positive for abdominal pain, constipation and nausea. Negative for diarrhea and vomiting. Genitourinary: Negative for dysuria, flank pain and frequency. Musculoskeletal: Negative for back pain and neck pain. All other systems reviewed and are negative. Vitals:  
 07/20/18 1122 07/20/18 1130 07/20/18 1200 BP: 150/89 (!) 155/95 (!) 140/98 Pulse: 73 74 69 Resp: 15 12 22 Temp: 98.8 °F (37.1 °C) SpO2: 97% 95% 98% Weight: 61.2 kg (135 lb) Height: 5' 4\" (1.626 m) Physical Exam  
Constitutional: She is oriented to person, place, and time. She appears well-developed and well-nourished. She appears distressed. Curled up in fetal position HENT:  
Head: Normocephalic and atraumatic. Eyes: Conjunctivae and EOM are normal.  
Neck: Normal range of motion. Cardiovascular: Normal rate, regular rhythm, normal heart sounds and intact distal pulses. No murmur heard. Pulmonary/Chest: Effort normal and breath sounds normal. No stridor. No respiratory distress. Abdominal: Soft. Bowel sounds are normal. There is tenderness in the right upper quadrant and epigastric area. There is no rebound and no guarding. Genitourinary:  
Genitourinary Comments: No stool in vault, card sent for guaiac Musculoskeletal: Normal range of motion. She exhibits no edema, tenderness or deformity. Neurological: She is alert and oriented to person, place, and time. No cranial nerve deficit. Skin: She is not diaphoretic. Psychiatric: She has a normal mood and affect. Nursing note and vitals reviewed. MDM Number of Diagnoses or Management Options Abdominal pain, epigastric:  
Diagnosis management comments: Patient with upper abdominal pain with hx of ulcers and gastric bypass. Check labs, EKG, CT scan and re-eval after results and give meds for symptoms. Concern for recurrent bleed or perforation Amount and/or Complexity of Data Reviewed Clinical lab tests: ordered and reviewed Tests in the radiology section of CPT®: ordered and reviewed Risk of Complications, Morbidity, and/or Mortality Presenting problems: high Diagnostic procedures: high Management options: high Patient Progress Patient progress: improved ED Course Procedures EK18 @ 11:30 normal sinus rhythm, rate 72, LAE, no ectopy, no ischemia noted. VITALS:  
Patient Vitals for the past 8 hrs: 
 Temp Pulse Resp BP SpO2  
18 1345 - 67 15 124/68 96 %  
18 1315 - (!) 56 18 173/76 97 %  
18 1300 - (!) 55 17 165/90 96 %  
18 1245 - (!) 54 17 157/80 96 %  
18 1230 - (!) 55 18 148/80 96 %  
18 1215 - (!) 57 15 152/86 96 %  
18 1200 - 69 22 (!) 140/98 98 %  
18 1130 - 74 12 (!) 155/95 95 %  
18 1122 98.8 °F (37.1 °C) 73 15 150/89 97 % Recent Results (from the past 24 hour(s)) CBC WITH AUTOMATED DIFF Collection Time: 18 11:41 AM  
Result Value Ref Range WBC 3.7 3.6 - 11.0 K/uL  
 RBC 4.25 3.80 - 5.20 M/uL  
 HGB 10.1 (L) 11.5 - 16.0 g/dL HCT 32.5 (L) 35.0 - 47.0 % MCV 76.5 (L) 80.0 - 99.0 FL  
 MCH 23.8 (L) 26.0 - 34.0 PG  
 MCHC 31.1 30.0 - 36.5 g/dL  
 RDW 16.6 (H) 11.5 - 14.5 % PLATELET 533 190 - 167 K/uL MPV 9.0 8.9 - 12.9 FL  
 NRBC 0.0 0  WBC ABSOLUTE NRBC 0.00 0.00 - 0.01 K/uL NEUTROPHILS 42 32 - 75 % LYMPHOCYTES 48 12 - 49 % MONOCYTES 8 5 - 13 % EOSINOPHILS 1 0 - 7 % BASOPHILS 1 0 - 1 % IMMATURE GRANULOCYTES 0 0.0 - 0.5 % ABS. NEUTROPHILS 1.6 (L) 1.8 - 8.0 K/UL  
 ABS. LYMPHOCYTES 1.8 0.8 - 3.5 K/UL  
 ABS. MONOCYTES 0.3 0.0 - 1.0 K/UL  
 ABS. EOSINOPHILS 0.0 0.0 - 0.4 K/UL  
 ABS. BASOPHILS 0.0 0.0 - 0.1 K/UL  
 ABS. IMM. GRANS. 0.0 0.00 - 0.04 K/UL  
 DF AUTOMATED METABOLIC PANEL, COMPREHENSIVE Collection Time: 18 11:41 AM  
Result Value Ref Range  Sodium 141 136 - 145 mmol/L Potassium 3.4 (L) 3.5 - 5.1 mmol/L Chloride 106 97 - 108 mmol/L  
 CO2 30 21 - 32 mmol/L Anion gap 5 5 - 15 mmol/L Glucose 106 (H) 65 - 100 mg/dL BUN 14 6 - 20 MG/DL Creatinine 0.69 0.55 - 1.02 MG/DL  
 BUN/Creatinine ratio 20 12 - 20 GFR est AA >60 >60 ml/min/1.73m2 GFR est non-AA >60 >60 ml/min/1.73m2 Calcium 9.0 8.5 - 10.1 MG/DL Bilirubin, total 0.3 0.2 - 1.0 MG/DL  
 ALT (SGPT) 25 12 - 78 U/L  
 AST (SGOT) 25 15 - 37 U/L Alk. phosphatase 55 45 - 117 U/L Protein, total 7.3 6.4 - 8.2 g/dL Albumin 3.3 (L) 3.5 - 5.0 g/dL Globulin 4.0 2.0 - 4.0 g/dL A-G Ratio 0.8 (L) 1.1 - 2.2 LIPASE Collection Time: 07/20/18 11:41 AM  
Result Value Ref Range Lipase 182 73 - 393 U/L  
OCCULT BLOOD, STOOL Collection Time: 07/20/18 11:41 AM  
Result Value Ref Range Occult blood, stool NEGATIVE  NEG    
TROPONIN I Collection Time: 07/20/18 11:41 AM  
Result Value Ref Range Troponin-I, Qt. <0.05 <0.05 ng/mL Ct Abd Pelv W Cont Result Date: 7/20/2018 INDICATION: Severe abdominal pain for 3 days. Previous gastric bypass surgery. Previous bleeding peptic ulcer disease. Cholecystectomy. COMPARISON: CT abdomen/pelvis without contrast on 9/8/2016. TECHNIQUE: Following the uneventful intravenous administration of 100 cc Isovue-370, thin axial images were obtained through the abdomen and pelvis. Coronal and sagittal reconstructions were generated. Oral contrast was not administered. CT dose reduction was achieved through use of a standardized protocol tailored for this examination and automatic exposure control for dose modulation. FINDINGS: Lung bases: Mild atelectasis. No lung base pneumonia. Incidentally imaged heart and mediastinum: Unremarkable. Liver: No mass or biliary dilatation. Gallbladder: Cholecystectomy. CBD is not dilated. Spleen: Normal size. No mass or infarct. Pancreas: No mass or ductal dilatation. Adrenals: Unremarkable.  Kidneys: No mass, calculus, or hydronephrosis. Stomach: Gastric bypass surgery. No distention or mural gas. Small bowel: Postsurgical. No obstruction. Colon: No dilatation or wall thickening. Appendix: Small versus resected. Peritoneum: No ascites or pneumoperitoneum. Retroperitoneum: No lymphadenopathy or aortic aneurysm. Reproductive organs: Enlarged uterus contains partially calcified fibroids, no significant change. No adnexal mass. Urinary bladder: Nondistended. Bones: No destructive bone lesion. Additional comments: N/A IMPRESSION: 1. No acute process on CT. 2. Cholecystectomy and gastric bypass surgery. 3. Calcified uterine fibroids.

## 2018-07-23 ENCOUNTER — PATIENT OUTREACH (OUTPATIENT)
Dept: FAMILY MEDICINE CLINIC | Age: 68
End: 2018-07-23

## 2018-07-23 LAB
ATRIAL RATE: 72 BPM
CALCULATED P AXIS, ECG09: 64 DEGREES
CALCULATED R AXIS, ECG10: 40 DEGREES
CALCULATED T AXIS, ECG11: 58 DEGREES
DIAGNOSIS, 93000: NORMAL
P-R INTERVAL, ECG05: 170 MS
Q-T INTERVAL, ECG07: 374 MS
QRS DURATION, ECG06: 78 MS
QTC CALCULATION (BEZET), ECG08: 409 MS
VENTRICULAR RATE, ECG03: 72 BPM

## 2018-07-23 NOTE — PROGRESS NOTES
[facility]  ED  Discharge Follow-Up        Patient listed on discharge report on 7/20/18. Patient discharged from [address] [place] [address] Avenue for Abdominal pain. Panel Manager contacted the patient by telephone to perform post ED discharge assessment. No answer, message left requesting return call.

## 2018-08-07 ENCOUNTER — PATIENT OUTREACH (OUTPATIENT)
Dept: FAMILY MEDICINE CLINIC | Age: 68
End: 2018-08-07

## 2018-08-07 NOTE — PROGRESS NOTES
Follow up call placed to patient. Patient discharged from [facility] for Abdominal pain. Panel Manager contacted the patient by telephone to perform post ED discharge assessment. No answer, message left requesting return call. Letter also mailed to patient.

## 2018-08-07 NOTE — LETTER
8/7/2018 9:58 AM 
 
Ms. [address] [facility] [place] [id number] I am a Nurse Panel Manager working with [address]. Part of my job is to follow up with patients who have been discharged from the Emergency Department or Hospital recently. I have tried to reach you by telephone with no luck. Please contact our office to schedule an follow up appointment with Dr. Lopez Tsai as soon as possible. Look forward to see you soon! Sincerely, Indira Corona LPN

## 2018-08-21 ENCOUNTER — PATIENT OUTREACH (OUTPATIENT)
Dept: FAMILY MEDICINE CLINIC | Age: 68
End: 2018-08-21

## 2018-08-21 NOTE — PROGRESS NOTES
Patient has graduated from the Transitions of Care Coordination  program on 8/21/18. Patient's symptoms are stable at this time. Patient/family has the ability to self-manage. Care management goals have been completed at this time. No further nurse navigator follow up scheduled. Pt has nurse navigator's contact information for any further questions, concerns, or needs.   Patients upcoming visits:  Future Appointments  Date Time Provider Andrade Rivera   12/20/2018 10:30 AM Kathy Billings MD [address] [place] [address]

## 2018-09-05 ENCOUNTER — TELEPHONE (OUTPATIENT)
Dept: FAMILY MEDICINE CLINIC | Age: 68
End: 2018-09-05

## 2018-09-05 NOTE — TELEPHONE ENCOUNTER
Needs to speak to the nurse about getting Physical Therapy. Please call [phone] after 3 pm to discuss.

## 2018-09-05 NOTE — TELEPHONE ENCOUNTER
Called and spoke with pt, and she states she would like an order to receive PT. Pt advised she will need to come in for evaluation. Pt states understanding, that she is currently in [facility], but once discharged will call and schedule an appointment.

## 2018-09-06 ENCOUNTER — TELEPHONE (OUTPATIENT)
Dept: FAMILY MEDICINE CLINIC | Age: 68
End: 2018-09-06

## 2018-09-14 DIAGNOSIS — I10 ESSENTIAL HYPERTENSION: ICD-10-CM

## 2018-09-14 DIAGNOSIS — M79.7 FIBROMYALGIA: ICD-10-CM

## 2018-09-14 NOTE — TELEPHONE ENCOUNTER
Pt called stating she needs refills on her medications sent to [facility] DR JT MATT while she's in treatment and will follow up with Dr. Megan Do when she's out in December.  Harrym

## 2018-09-16 RX ORDER — GABAPENTIN 400 MG/1
800 CAPSULE ORAL 3 TIMES DAILY
Qty: 540 CAP | Refills: 3 | Status: SHIPPED | OUTPATIENT
Start: 2018-09-16 | End: 2019-04-08 | Stop reason: SDUPTHER

## 2018-09-16 RX ORDER — METOPROLOL TARTRATE 75 MG/1
75 TABLET, FILM COATED ORAL 2 TIMES DAILY
Qty: 180 TAB | Refills: 3 | Status: SHIPPED | OUTPATIENT
Start: 2018-09-16 | End: 2018-11-01 | Stop reason: SDUPTHER

## 2018-09-16 RX ORDER — ESOMEPRAZOLE MAGNESIUM 40 MG/1
40 CAPSULE, DELAYED RELEASE ORAL DAILY
Qty: 903 CAP | Refills: 0 | Status: SHIPPED | OUTPATIENT
Start: 2018-09-16 | End: 2018-09-17 | Stop reason: SDUPTHER

## 2018-09-17 NOTE — TELEPHONE ENCOUNTER
HOSP [facility] DR JT MATT faxed request to clarify quantity on generic nexium. #350 was escribed.

## 2018-09-20 RX ORDER — ESOMEPRAZOLE MAGNESIUM 40 MG/1
40 CAPSULE, DELAYED RELEASE ORAL DAILY
Qty: 90 CAP | Refills: 3 | Status: SHIPPED | OUTPATIENT
Start: 2018-09-20 | End: 2018-11-01

## 2018-09-24 ENCOUNTER — TELEPHONE (OUTPATIENT)
Dept: FAMILY MEDICINE CLINIC | Age: 68
End: 2018-09-24

## 2018-09-24 NOTE — TELEPHONE ENCOUNTER
Pt called stating she's at [facility] in outpatient facility and is requesting order from Dr. Arash De La Torre for a \"stomach scope\" due to history of gastric bypass now with constant nausea caused by Depakote, stating she had bleeding ulcer last year, and has been advised by both her psychiatrists to have it done. Please call to advise.  Porfirio

## 2018-10-31 NOTE — PROGRESS NOTES
HISTORY OF PRESENT ILLNESS  Lorraine Mora is a 79 y.o. female. Lorraine Mora is here for a follow up on her HTN. Her pendulous breasts are causing pain in her neck, upper back and arms. Also, she has been having right knee pain for over a year. It is getting worse. Cold weather and walking make it worse. No redness or swelling, no warmth. Denies locking or giving way. Nothing makes it better. She wants to be seen by the knee people at [facility] but does not know their name. Hypertension   The history is provided by the patient. This is a chronic problem. The current episode started more than 1 week ago. The problem occurs constantly. The problem has been gradually improving. Pertinent negatives include no chest pain, no abdominal pain, no headaches and no shortness of breath. Nothing aggravates the symptoms. The symptoms are relieved by medications. Treatments tried: Norvasc, metoprolol. The treatment provided significant relief. Review of Systems   Constitutional: Negative for weight loss. No weight gain   Eyes: Positive for blurred vision. She needs an eye exam   Respiratory: Negative for shortness of breath. Cardiovascular: Negative for chest pain and leg swelling. Gastrointestinal: Negative for abdominal pain. Musculoskeletal: Positive for back pain and joint pain. Neurological: Negative for dizziness, sensory change, speech change, focal weakness and headaches. Visit Vitals  /71   Pulse (!) 51   Temp 97.7 °F (36.5 °C) (Oral)   Resp 18   Ht 5' 4\" (1.626 m)   Wt 130 lb (59 kg)   BMI 22.31 kg/m²     BP Readings from Last 3 Encounters:   11/01/18 108/71   07/20/18 124/68   06/27/18 130/87     Physical Exam   Constitutional: She is oriented to person, place, and time. She appears well-developed and well-nourished. No distress. Cardiovascular: Normal rate, regular rhythm and normal heart sounds. Exam reveals no gallop and no friction rub.    No murmur heard.  Pulmonary/Chest: Effort normal and breath sounds normal. No respiratory distress. She has no wheezes. She has no rales. Genitourinary:   Genitourinary Comments: Large, pendulous breasts   Musculoskeletal: She exhibits no edema. Right knee: She exhibits bony tenderness. She exhibits normal range of motion, no swelling, no effusion, no erythema, no LCL laxity and no MCL laxity. No medial joint line tenderness noted. Varus deformity at the knee   Neurological: She is alert and oriented to person, place, and time. Skin: Skin is warm and dry. She is not diaphoretic. Nursing note and vitals reviewed. ASSESSMENT and PLAN    ICD-10-CM ICD-9-CM    1. Essential hypertension I10 401.9 metoprolol tartrate 75 mg tab   2. Macromastia N62 611.1 REFERRAL TO PLASTIC SURGERY   3. Chronic pain of right knee M25.561 719.46 CANCELED: XR KNEE RT MIN 4 V    G89.29 338.29    4. Need for hepatitis C screening test Z11.59 V73.89 HCV AB W/RFLX TO [place]   5. Breast cancer screening Z12.31 V76.10 BABITA MAMMO BI SCREENING INCL CAD   6. Osteoporosis screening Z13.820 V82.81 DEXA BONE DENSITY STUDY AXIAL   7. Encounter for immunization Z23 V03.89 varicella-zoster recombinant, PF, (SHINGRIX, PF,) 50 mcg/0.5 mL susr injection      pneumococcal 13 osiris conj dip (PREVNAR-13) 0.5 mL syrg injection   8. Encounter for screening mammogram for malignant neoplasm of breast Z12.31 V76.12 BABITA MAMMO BI SCREENING INCL CAD   9. Menopause Z78.0 627.2 DEXA BONE DENSITY STUDY AXIAL        Blood pressure controlled  Back, neck and arm pain due to macromastia  Likely knee osteoarthritis  Labs per orders. Plastic surgery referral  Knee x-ray  Mammogram  Bone density  Shingrix and Prevnar at pharmacy  Continue current plans. Follow-up Disposition:  Return in about 6 months (around 5/1/2019) for blood pressure. Reviewed plan of care. Patient has provided input and agrees with goals.

## 2018-11-01 ENCOUNTER — OFFICE VISIT (OUTPATIENT)
Dept: FAMILY MEDICINE CLINIC | Age: 68
End: 2018-11-01

## 2018-11-01 ENCOUNTER — TELEPHONE (OUTPATIENT)
Dept: FAMILY MEDICINE CLINIC | Age: 68
End: 2018-11-01

## 2018-11-01 ENCOUNTER — HOSPITAL ENCOUNTER (OUTPATIENT)
Dept: GENERAL RADIOLOGY | Age: 68
Discharge: HOME | End: 2018-11-01
Payer: MEDICARE

## 2018-11-01 VITALS
SYSTOLIC BLOOD PRESSURE: 108 MMHG | HEIGHT: 64 IN | DIASTOLIC BLOOD PRESSURE: 71 MMHG | RESPIRATION RATE: 18 BRPM | TEMPERATURE: 97.7 F | WEIGHT: 130 LBS | BODY MASS INDEX: 22.2 KG/M2 | HEART RATE: 51 BPM

## 2018-11-01 DIAGNOSIS — M25.561 CHRONIC PAIN OF RIGHT KNEE: ICD-10-CM

## 2018-11-01 DIAGNOSIS — N62 MACROMASTIA: ICD-10-CM

## 2018-11-01 DIAGNOSIS — I10 ESSENTIAL HYPERTENSION: Primary | ICD-10-CM

## 2018-11-01 DIAGNOSIS — G89.29 CHRONIC PAIN OF RIGHT KNEE: ICD-10-CM

## 2018-11-01 DIAGNOSIS — Z12.31 ENCOUNTER FOR SCREENING MAMMOGRAM FOR MALIGNANT NEOPLASM OF BREAST: ICD-10-CM

## 2018-11-01 DIAGNOSIS — Z23 ENCOUNTER FOR IMMUNIZATION: ICD-10-CM

## 2018-11-01 DIAGNOSIS — Z11.59 NEED FOR HEPATITIS C SCREENING TEST: ICD-10-CM

## 2018-11-01 DIAGNOSIS — Z13.820 OSTEOPOROSIS SCREENING: ICD-10-CM

## 2018-11-01 DIAGNOSIS — Z78.0 MENOPAUSE: ICD-10-CM

## 2018-11-01 DIAGNOSIS — Z12.39 BREAST CANCER SCREENING: ICD-10-CM

## 2018-11-01 PROCEDURE — 73562 X-RAY EXAM OF KNEE 3: CPT

## 2018-11-01 RX ORDER — QUETIAPINE FUMARATE 50 MG/1
25 TABLET, FILM COATED ORAL
COMMUNITY
End: 2019-12-05

## 2018-11-01 RX ORDER — METOPROLOL TARTRATE 75 MG/1
75 TABLET, FILM COATED ORAL 2 TIMES DAILY
Qty: 180 TAB | Refills: 3 | Status: SHIPPED | OUTPATIENT
Start: 2018-11-01 | End: 2018-11-27 | Stop reason: SDUPTHER

## 2018-11-01 RX ORDER — AMLODIPINE BESYLATE 10 MG/1
TABLET ORAL DAILY
COMMUNITY
End: 2019-02-22

## 2018-11-01 RX ORDER — LANOLIN ALCOHOL/MO/W.PET/CERES
500 CREAM (GRAM) TOPICAL DAILY
COMMUNITY
End: 2019-04-08

## 2018-11-01 RX ORDER — BISMUTH SUBSALICYLATE 262 MG
2 TABLET,CHEWABLE ORAL DAILY
COMMUNITY
End: 2021-10-01 | Stop reason: SDUPTHER

## 2018-11-01 RX ORDER — VALPROIC ACID 250 MG/5ML
250 SOLUTION ORAL 3 TIMES DAILY
COMMUNITY
End: 2019-08-02

## 2018-11-01 NOTE — TELEPHONE ENCOUNTER
Pharmacist, [facility] called from [address] stating the 75 mg Metoprolol is currently on backorder and is asking if Dr. Linda Paul can change prescription to Metoprolol 25 mg, taking 3 tablets twice daily. Please advise.   Porfirio

## 2018-11-01 NOTE — PROGRESS NOTES
Chief Complaint   Patient presents with    Blood Pressure Check     6 wk f/u     1. Have you been to the ER, urgent care clinic since your last visit? Hospitalized since your last visit? Yes Porfirio Jakob dx suidal ideation 10/2018     2. Have you seen or consulted any other health care providers outside of the [address] [place] [address] since your last visit? Include any pap smears or colon screening.  Dr. Yolanda Islas psychiatrist

## 2018-11-01 NOTE — TELEPHONE ENCOUNTER
Called pharmacy and verbal for 25 MG 3 tablets twice daily has been called in per orders. 180 tablets with 0 refills.

## 2018-11-02 ENCOUNTER — TELEPHONE (OUTPATIENT)
Dept: FAMILY MEDICINE CLINIC | Age: 68
End: 2018-11-02

## 2018-11-02 DIAGNOSIS — N62 MACROMASTIA: Primary | ICD-10-CM

## 2018-11-02 NOTE — TELEPHONE ENCOUNTER
Pt called stating she was given referral to addiction specialist instead of referral for breast reduction after her appointment this week with Dr. Mariam Dela Cruz.   Harrym

## 2018-11-05 NOTE — TELEPHONE ENCOUNTER
Called and spoke with pt, and she has been advised and states understanding of new referral and has been provided with providers information to call and schedule her appointment.

## 2018-11-08 ENCOUNTER — TELEPHONE (OUTPATIENT)
Dept: FAMILY MEDICINE CLINIC | Age: 68
End: 2018-11-08

## 2018-11-08 DIAGNOSIS — M17.11 PRIMARY OSTEOARTHRITIS OF RIGHT KNEE: Primary | ICD-10-CM

## 2018-11-08 LAB
HCV AB S/CO SERPL IA: >11 S/CO RATIO (ref 0–0.9)
HCV RNA SERPL QL NAA+PROBE: NEGATIVE
INTERPRETATION: NORMAL

## 2018-11-08 NOTE — TELEPHONE ENCOUNTER
Pt called requesting a referral for possible right knee replacement.  Had xray 11/01/18  Please call [phone]

## 2018-11-09 NOTE — TELEPHONE ENCOUNTER
Called pt, and left a voice message, asking that she call the office back in regards to her referral request.

## 2018-11-09 NOTE — TELEPHONE ENCOUNTER
Pt called office back and has been advised and states understanding of nee referral .     Pt will call Dr. Ezra Chanel and schedule appointment.

## 2018-11-09 NOTE — TELEPHONE ENCOUNTER
Please call patient and let her know her knee x-ray shows osteoarthritis  I have printed a referral request for Orthopedics, but the doctor is the one who decides whether she needs a knee replacement or not.

## 2018-11-25 ENCOUNTER — TELEPHONE (OUTPATIENT)
Dept: FAMILY MEDICINE CLINIC | Age: 68
End: 2018-11-25

## 2018-11-25 DIAGNOSIS — R76.8 POSITIVE HEPATITIS C ANTIBODY TEST: Primary | ICD-10-CM

## 2018-11-26 NOTE — TELEPHONE ENCOUNTER
Please call patient and let her know that her hepatitis C testing shows that she likely had hepatitis C in the past, but has recovered from it. She needs to have this test repeated in one month to be sure. I have printed a lab slip.

## 2018-11-26 NOTE — TELEPHONE ENCOUNTER
Attempted to contact patient in reference to the message below, no answer, left a message on patient's personal VM to return call to office.

## 2018-11-27 DIAGNOSIS — I10 ESSENTIAL HYPERTENSION: ICD-10-CM

## 2018-11-27 NOTE — TELEPHONE ENCOUNTER
----- Message from KelliSurIDxal sent at 11/27/2018 10:31 AM EST -----  Regarding: Dr. Alysa Merino  Pt is requesting for medication \"Metoprolol 75mg\" sent to [address] [place] [address] [phone]. Best contact [phone].

## 2018-12-02 RX ORDER — METOPROLOL TARTRATE 75 MG/1
75 TABLET, FILM COATED ORAL 2 TIMES DAILY
Qty: 180 TAB | Refills: 3 | Status: SHIPPED | OUTPATIENT
Start: 2018-12-02 | End: 2018-12-04 | Stop reason: SDUPTHER

## 2018-12-03 ENCOUNTER — TELEPHONE (OUTPATIENT)
Dept: FAMILY MEDICINE CLINIC | Age: 68
End: 2018-12-03

## 2018-12-03 DIAGNOSIS — I10 ESSENTIAL HYPERTENSION: ICD-10-CM

## 2018-12-03 NOTE — TELEPHONE ENCOUNTER
[address] faxed request to change metoprolol 75mg. It is on backorder. Need to change to new strength.

## 2018-12-04 RX ORDER — METOPROLOL TARTRATE 75 MG/1
75 TABLET, FILM COATED ORAL 2 TIMES DAILY
Qty: 180 TAB | Refills: 3 | Status: SHIPPED | OUTPATIENT
Start: 2018-12-04 | End: 2018-12-09

## 2018-12-05 ENCOUNTER — TELEPHONE (OUTPATIENT)
Dept: FAMILY MEDICINE CLINIC | Age: 68
End: 2018-12-05

## 2018-12-05 DIAGNOSIS — I10 ESSENTIAL HYPERTENSION: ICD-10-CM

## 2018-12-05 RX ORDER — METOPROLOL SUCCINATE 50 MG/1
50 TABLET, EXTENDED RELEASE ORAL DAILY
Qty: 135 TAB | Refills: 1 | Status: CANCELLED | OUTPATIENT
Start: 2018-12-05

## 2018-12-05 NOTE — TELEPHONE ENCOUNTER
Pharmacy called office and manufacture does not have 75 MG tablets.      Rx needs to be for      50 MG 1.5 tablets    25 MG take 3 tablets

## 2018-12-05 NOTE — TELEPHONE ENCOUNTER
Pt called the office and would like medication sent to her [address] [facility] in [place].

## 2018-12-09 RX ORDER — METOPROLOL SUCCINATE 25 MG/1
75 TABLET, EXTENDED RELEASE ORAL 2 TIMES DAILY
Qty: 180 TAB | Refills: 1 | Status: SHIPPED | OUTPATIENT
Start: 2018-12-09 | End: 2019-02-22 | Stop reason: SDUPTHER

## 2019-02-01 ENCOUNTER — OFFICE VISIT (OUTPATIENT)
Dept: FAMILY MEDICINE CLINIC | Age: 69
End: 2019-02-01

## 2019-02-01 VITALS
SYSTOLIC BLOOD PRESSURE: 156 MMHG | HEIGHT: 64 IN | WEIGHT: 139 LBS | RESPIRATION RATE: 18 BRPM | TEMPERATURE: 97.9 F | BODY MASS INDEX: 23.73 KG/M2 | DIASTOLIC BLOOD PRESSURE: 102 MMHG | HEART RATE: 76 BPM

## 2019-02-01 DIAGNOSIS — I10 ESSENTIAL HYPERTENSION: ICD-10-CM

## 2019-02-01 DIAGNOSIS — M25.561 CHRONIC PAIN OF RIGHT KNEE: ICD-10-CM

## 2019-02-01 DIAGNOSIS — G89.29 CHRONIC PAIN OF RIGHT KNEE: ICD-10-CM

## 2019-02-01 DIAGNOSIS — L85.3 XEROSIS OF SKIN: Primary | ICD-10-CM

## 2019-02-01 RX ORDER — TRIAMCINOLONE ACETONIDE 1 MG/G
CREAM TOPICAL
Qty: 45 G | Refills: 2 | Status: SHIPPED | OUTPATIENT
Start: 2019-02-01 | End: 2019-02-22

## 2019-02-01 RX ORDER — HYDROXYZINE 50 MG/1
50 TABLET, FILM COATED ORAL
Qty: 30 TAB | Refills: 1 | Status: SHIPPED | OUTPATIENT
Start: 2019-02-01 | End: 2019-03-21 | Stop reason: SDUPTHER

## 2019-02-01 NOTE — PROGRESS NOTES
HISTORY OF PRESENT ILLNESS  Tony Mcdermott is a 79 y.o. female. Tony Mcdermott is here due to skin itching for over a year. No rash. It is getting worse. Lying down makes her worse and it keeps her up. She has tried all kinds of oils without relief except coconut oil gives temporary relief. Current skin care products include Gold Bond Intensive Cream, coconut oil and Dove liquid or [place] soap. Her skin is dry. She also needs paperwork done because she cannot walk to the bus stop due to her chronic knee pain. Of note, she has been drug, alcohol and tobacco free for 4 months. Review of Systems   Skin: Positive for itching. Negative for rash. Visit Vitals  BP (!) 156/102   Pulse 76   Temp 97.9 °F (36.6 °C) (Oral)   Resp 18   Ht 5' 4\" (1.626 m)   Wt 139 lb (63 kg)   BMI 23.86 kg/m²     BP Readings from Last 3 Encounters:   02/01/19 (!) 156/102   11/01/18 108/71   07/20/18 124/68     Physical Exam   Constitutional: She is oriented to person, place, and time. She appears well-developed and well-nourished. No distress. Neurological: She is alert and oriented to person, place, and time. Skin: No rash noted. She is not diaphoretic. No erythema. Back, arms and legs with extremely dry skin       ASSESSMENT and PLAN    ICD-10-CM ICD-9-CM    1. Xerosis of skin L85.3 706.8 hydrOXYzine HCl (ATARAX) 50 mg tablet      triamcinolone acetonide (KENALOG) 0.1 % topical cream   2. Chronic pain of right knee M25.561 719.46     G89.29 338.29    3. Essential hypertension I10 401.9         Skin care information reviewed and given to the patient  Atarax prn  Kenalog cream prn  Bus stop paperwork completed  Blood pressure elevated    Follow-up Disposition:  Return in about 6 weeks (around 3/15/2019) for blood pressure. Reviewed plan of care. Patient has provided input and agrees with goals.

## 2019-02-01 NOTE — PATIENT INSTRUCTIONS
Dry Skin: After Your Visit    . How can you care for yourself at home? If your doctor prescribes a cream, ointment, or lotion, use it as directed. If your doctor prescribes other medicine, take it exactly as directed. Wash with [employer], taking short, lukewarm showers or baths. Pat dry. Apply a moisturizer after bathing and each time the skin is dried after it has become wet. . Use a cream such as Lubriderm, Moisturel, or Cetaphil that does not irritate the skin or cause a rash. Apply the cream while your skin is still damp after lightly drying with a towel. If these are not effective, use a urea based cream such Aqua Care or Carmol 10. Use ice packs to reduce itching. Keep cool, and stay out of the sun. If itching affects your normal activities, an over-the-counter antihistamine taken by mouth, such as diphenhydramine (Benadryl) or loratadine (Claritin) may help. Read and follow all instructions on the label. Do not use anti-itch creams or lotions other than hydrocortisone. When should you call for help? Call your doctor now or seek immediate medical care if:  Your itching gets worse  You have new blisters or bruises, or rash  You have signs of infection, such as: Increased pain, swelling, warmth, or redness. Pus draining from a rash. A fever. You have crusting or oozing sores. You have joint aches or body aches  Watch closely for changes in your health, and be sure to contact your doctor if:  Your itching does not clear up after 2 to 3 weeks of home treatment. Itching interferes with your sleep or daily activities. © 9455-9398 Healthwise, Incorporated. Care instructions adapted under license by [employer] (which disclaims liability or warranty for this information).  This care instruction is for use with your licensed healthcare professional. If you have questions about a medical condition or this instruction, always ask your healthcare professional. John Cruz disclaims any warranty or liability for your use of this information. Content Version: 7.5.15580; Last Revised: April 11, 2011        DASH Diet: After Your Visit   Your Care Instructions   The DASH diet is an eating plan that can help lower your blood pressure. DASH stands for Dietary Approaches to Stop Hypertension. Hypertension is high blood pressure. The DASH diet focuses on eating foods that are high in calcium, potassium, and magnesium. These nutrients can lower blood pressure. The foods that are highest in these nutrients are fruits, vegetables, low-fat dairy products, nuts, seeds, and legumes. But taking calcium, potassium, and magnesium supplements instead of eating foods that are high in those nutrients does not have the same effect. The DASH diet also includes whole grains, fish, and poultry. The DASH diet is one of several lifestyle changes your doctor may recommend to lower your high blood pressure. Your doctor may also want you to decrease the amount of sodium in your diet. Lowering sodium while following the DASH diet can lower blood pressure even further than just the DASH diet alone. Follow-up care is a key part of your treatment and safety. Be sure to make and go to all appointments, and call your doctor if you are having problems. Its also a good idea to know your test results and keep a list of the medicines you take. How can you care for yourself at home? Following the DASH diet   Eat 4 to 5 servings of fruit each day. A serving is 1 medium-sized piece of fruit, 1/2 cup chopped or canned fruit, 1/4 cup dried fruit, or 4 ounces (1/2 cup) of fruit juice. Choose fruit more often than fruit juice. Eat 4 to 5 servings of vegetables each day. A serving is 1 cup of lettuce or raw leafy vegetables, 1/2 cup of chopped or cooked vegetables, or 4 ounces (1/2 cup) of vegetable juice. Choose vegetables more often than vegetable juice. Get 3 servings of low-fat dairy each day.  A serving is 8 ounces of milk, 1 cup of yogurt, or 1 1/2 ounces of cheese. Eat 7 to 8 servings of grains each day. A serving is 1 slice of bread, 1 ounce of dry cereal, or 1/2 cup of cooked rice, pasta, or cooked cereal. Try to choose whole-grain products as much as possible. Limit lean meat, poultry, and fish to 6 ounces each day. Six ounces is about the size of two decks of cards. Eat 4 to 5 servings of nuts, seeds, and legumes (cooked dried beans, lentils, and split peas) each week. A serving is 1/3 cup of nuts, 2 tablespoons of seeds, or 1/2 cup cooked dried beans or peas. Tips for success   Start small. Do not try to make dramatic changes to your diet all at once. You might feel that you are missing out on your favorite foods and then be more likely to not follow the plan. Make small changes, and stick with them. Once those changes become habit, add a few more changes. Try some of the following:   Make it a goal to eat a fruit or vegetable at every meal and at snacks. This will make it easy to get the recommended amount of fruits and vegetables each day. Try yogurt topped with fruit and nuts for a snack or healthy dessert. Add lettuce, tomato, cucumber, and onion to sandwiches. Combine a ready-made pizza crust with low-fat mozzarella cheese and lots of vegetable toppings. Try using tomatoes, squash, spinach, broccoli, carrots, cauliflower, and onions. Have a variety of cut-up vegetables with a low-fat dip as an appetizer instead of chips and dip. Sprinkle sunflower seeds or chopped almonds over salads. Or try adding chopped walnuts or almonds to cooked vegetables. Try some vegetarian meals using beans and peas. Add garbanzo or kidney beans to salads. Make burritos and tacos with mashed waters beans or black beans. Where can you learn more? Go to Rankomat.pl.be   Enter H967 in the search box to learn more about \"DASH Diet: After Your Visit. \"    © 4607-5574 Healthwise, Incorporated. Care instructions adapted under license by [employer] (which disclaims liability or warranty for this information). This care instruction is for use with your licensed healthcare professional. If you have questions about a medical condition or this instruction, always ask your healthcare professional. [address] any warranty or liability for your use of this information.    Content Version: 0.4.594822; Last Revised: March 24, 2010

## 2019-02-01 NOTE — PROGRESS NOTES
Chief Complaint   Patient presents with    Skin Problem      itching all over and needs \"paratransit form to be filled out for knee problem\"      1. Have you been to the ER, urgent care clinic since your last visit? Hospitalized since your last visit? No     2. Have you seen or consulted any other health care providers outside of the [address] [place] [address] since your last visit? Include any pap smears or colon screening.  No

## 2019-02-22 ENCOUNTER — HOSPITAL ENCOUNTER (OUTPATIENT)
Dept: MAMMOGRAPHY | Age: 69
Discharge: HOME | End: 2019-02-22
Attending: FAMILY MEDICINE
Payer: MEDICARE

## 2019-02-22 ENCOUNTER — OFFICE VISIT (OUTPATIENT)
Dept: FAMILY MEDICINE CLINIC | Age: 69
End: 2019-02-22

## 2019-02-22 VITALS
TEMPERATURE: 98.5 F | BODY MASS INDEX: 24.07 KG/M2 | WEIGHT: 141 LBS | DIASTOLIC BLOOD PRESSURE: 95 MMHG | SYSTOLIC BLOOD PRESSURE: 147 MMHG | HEART RATE: 52 BPM | RESPIRATION RATE: 18 BRPM | HEIGHT: 64 IN

## 2019-02-22 DIAGNOSIS — F10.10 ALCOHOL ABUSE: ICD-10-CM

## 2019-02-22 DIAGNOSIS — F43.10 PTSD (POST-TRAUMATIC STRESS DISORDER): ICD-10-CM

## 2019-02-22 DIAGNOSIS — I10 ESSENTIAL HYPERTENSION: Primary | ICD-10-CM

## 2019-02-22 DIAGNOSIS — Z13.820 OSTEOPOROSIS SCREENING: ICD-10-CM

## 2019-02-22 DIAGNOSIS — Z78.0 MENOPAUSE: ICD-10-CM

## 2019-02-22 DIAGNOSIS — K90.89 OTHER SPECIFIED INTESTINAL MALABSORPTION: ICD-10-CM

## 2019-02-22 DIAGNOSIS — Z12.39 BREAST CANCER SCREENING: ICD-10-CM

## 2019-02-22 DIAGNOSIS — Z12.31 ENCOUNTER FOR SCREENING MAMMOGRAM FOR MALIGNANT NEOPLASM OF BREAST: ICD-10-CM

## 2019-02-22 DIAGNOSIS — F41.9 ANXIETY: ICD-10-CM

## 2019-02-22 DIAGNOSIS — F60.9 PERSONALITY DISORDER (HCC): ICD-10-CM

## 2019-02-22 PROCEDURE — 77067 SCR MAMMO BI INCL CAD: CPT

## 2019-02-22 PROCEDURE — 77080 DXA BONE DENSITY AXIAL: CPT

## 2019-02-22 RX ORDER — METOPROLOL SUCCINATE 25 MG/1
75 TABLET, EXTENDED RELEASE ORAL 2 TIMES DAILY
Qty: 180 TAB | Refills: 1 | Status: SHIPPED | OUTPATIENT
Start: 2019-02-22 | End: 2019-05-15 | Stop reason: SDUPTHER

## 2019-02-22 RX ORDER — AMLODIPINE BESYLATE 10 MG/1
10 TABLET ORAL DAILY
Qty: 30 TAB | Refills: 1 | Status: SHIPPED | OUTPATIENT
Start: 2019-02-22 | End: 2019-04-24 | Stop reason: SDUPTHER

## 2019-02-22 NOTE — PROGRESS NOTES
HISTORY OF PRESENT ILLNESS  Christina Walsh is a 79 y.o. female. Christina Walsh is here due to follow up on her HTN. Evidently, she had a psych admission in October and a psychiatric nurse told her the Norvasc was for her stomach, so she stopped taking it. She has a psychological support person coming to her house 3 x a week. She has been sober for 5 months. Also, her psychiatrist wants her to start exercising. She feels she needs nutritional counseling due to her history of a gastric bypass. Hypertension   The history is provided by the patient. This is a chronic problem. The current episode started more than 1 week ago. The problem occurs constantly. The problem has not changed since onset. Pertinent negatives include no chest pain, no abdominal pain, no headaches and no shortness of breath. Nothing aggravates the symptoms. The symptoms are relieved by medications. Treatments tried: Toprol XL, Norvasc. The treatment provided moderate relief. Review of Systems   Constitutional: Negative for weight loss. No weight gain   Eyes: Negative for blurred vision. Respiratory: Negative for shortness of breath. Cardiovascular: Negative for chest pain and leg swelling. Gastrointestinal: Negative for abdominal pain. Neurological: Negative for dizziness, sensory change, speech change, focal weakness and headaches. Visit Vitals  BP (!) 147/95   Pulse (!) 52   Temp 98.5 °F (36.9 °C) (Oral)   Resp 18   Ht 5' 4\" (1.626 m)   Wt 141 lb (64 kg)   BMI 24.20 kg/m²     BP Readings from Last 3 Encounters:   02/01/19 (!) 156/102   11/01/18 108/71   07/20/18 124/68     Physical Exam   Constitutional: She is oriented to person, place, and time. She appears well-developed and well-nourished. No distress. Cardiovascular: Normal rate, regular rhythm and normal heart sounds. Exam reveals no gallop and no friction rub. No murmur heard.   Pulmonary/Chest: Effort normal and breath sounds normal. No respiratory distress. She has no wheezes. She has no rales. Musculoskeletal: She exhibits no edema. Neurological: She is alert and oriented to person, place, and time. Skin: Skin is warm and dry. She is not diaphoretic. Nursing note and vitals reviewed. ASSESSMENT and PLAN    ICD-10-CM ICD-9-CM    1. Essential hypertension I10 401.9 metoprolol succinate (TOPROL-XL) 25 mg XL tablet      amLODIPine (NORVASC) 10 mg tablet   2. Alcohol abuse F10.10 305.00    3. Personality disorder ([address] F60.9 301.9    4. PTSD (post-traumatic stress disorder) F43.10 309.81    5. Anxiety F41.9 300.00    6. Other specified intestinal malabsorption K90.89 579.8         Blood pressure needs better control  Seeing Psychiatry for her multiple psych issues, seems to be doing better  Malabsorption due to gastric bypass, needs nutritional counseling  Restart Norvas  Resources for exercise and nutritional counseling discussed, she will get back with me if she needs referrals    Follow-up Disposition:  Return in about 6 weeks (around 4/5/2019) for blood pressure. Reviewed plan of care. Patient has provided input and agrees with goals.

## 2019-02-22 NOTE — PROGRESS NOTES
Chief Complaint   Patient presents with    Blood Pressure Check   Pt is not sure if she is taking amlodipine. Pt states that the topical cream gave her a rash. 1. Have you been to the ER, urgent care clinic since your last visit? Hospitalized since your last visit? No     2. Have you seen or consulted any other health care providers outside of the [address] [place] [address] since your last visit? Include any pap smears or colon screening.  No

## 2019-03-20 ENCOUNTER — TELEPHONE (OUTPATIENT)
Dept: FAMILY MEDICINE CLINIC | Age: 69
End: 2019-03-20

## 2019-03-20 DIAGNOSIS — L85.3 XEROSIS OF SKIN: ICD-10-CM

## 2019-03-20 NOTE — TELEPHONE ENCOUNTER
----- Message from Alecia Castaneda sent at 3/20/2019 12:29 PM EDT -----  Regarding: /Refill  Pt called requesting a refill on benadryl . Pt use the [place] [facility] . Pt stated she will be going out of town on Friday . Pt best contact number is [phone] .

## 2019-03-21 RX ORDER — HYDROXYZINE 50 MG/1
50 TABLET, FILM COATED ORAL
Qty: 30 TAB | Refills: 1 | Status: SHIPPED | OUTPATIENT
Start: 2019-03-21 | End: 2019-04-08 | Stop reason: SDUPTHER

## 2019-04-08 ENCOUNTER — OFFICE VISIT (OUTPATIENT)
Dept: FAMILY MEDICINE CLINIC | Age: 69
End: 2019-04-08

## 2019-04-08 VITALS
HEART RATE: 59 BPM | SYSTOLIC BLOOD PRESSURE: 130 MMHG | BODY MASS INDEX: 25.44 KG/M2 | RESPIRATION RATE: 18 BRPM | TEMPERATURE: 98.9 F | HEIGHT: 64 IN | DIASTOLIC BLOOD PRESSURE: 82 MMHG | WEIGHT: 149 LBS

## 2019-04-08 DIAGNOSIS — L85.3 XEROSIS OF SKIN: ICD-10-CM

## 2019-04-08 DIAGNOSIS — E66.3 OVERWEIGHT: ICD-10-CM

## 2019-04-08 DIAGNOSIS — E46 MALNUTRITION, UNSPECIFIED TYPE (HCC): ICD-10-CM

## 2019-04-08 DIAGNOSIS — M17.11 PRIMARY OSTEOARTHRITIS OF RIGHT KNEE: ICD-10-CM

## 2019-04-08 DIAGNOSIS — I10 ESSENTIAL HYPERTENSION: Primary | ICD-10-CM

## 2019-04-08 DIAGNOSIS — M79.7 FIBROMYALGIA: ICD-10-CM

## 2019-04-08 RX ORDER — GABAPENTIN 400 MG/1
800 CAPSULE ORAL 3 TIMES DAILY
Qty: 540 CAP | Refills: 3 | Status: SHIPPED | OUTPATIENT
Start: 2019-04-08 | End: 2019-12-05 | Stop reason: SDUPTHER

## 2019-04-08 RX ORDER — HYDROXYZINE 50 MG/1
50 TABLET, FILM COATED ORAL
Qty: 30 TAB | Refills: 1 | Status: SHIPPED | OUTPATIENT
Start: 2019-04-08 | End: 2019-06-26 | Stop reason: SDUPTHER

## 2019-04-08 NOTE — PROGRESS NOTES
HISTORY OF PRESENT ILLNESS  Leo Pan is a 79 y.o. female. Leo Pan is here for follow up on her HTN. The osteoarthritis in her right knee has gotten worse. She does not want to take OTC pain medications. Also, she wants to see a nutritionist for weight loss. Hypertension   The history is provided by the patient. This is a chronic problem. The current episode started more than 1 week ago. The problem occurs constantly. The problem has been gradually improving. Pertinent negatives include no chest pain, no abdominal pain, no headaches and no shortness of breath. Nothing aggravates the symptoms. The symptoms are relieved by medications. Treatments tried: Toprol XL, Norvasc. The treatment provided significant relief. Review of Systems   Constitutional: Negative for weight loss. Weight gain   Eyes: Negative for blurred vision. Respiratory: Negative for shortness of breath. Cardiovascular: Positive for leg swelling. Negative for chest pain. Gastrointestinal: Negative for abdominal pain. Neurological: Negative for dizziness, sensory change, speech change, focal weakness and headaches. Visit Vitals  /82   Pulse (!) 59   Temp 98.9 °F (37.2 °C) (Oral)   Resp 18   Ht 5' 4\" (1.626 m)   Wt 149 lb (67.6 kg)   BMI 25.58 kg/m²     BP Readings from Last 3 Encounters:   02/22/19 (!) 147/95   02/01/19 (!) 156/102   11/01/18 108/71     Physical Exam   Constitutional: She is oriented to person, place, and time. She appears well-developed and well-nourished. No distress. Cardiovascular: Normal rate, regular rhythm and normal heart sounds. Exam reveals no gallop and no friction rub. No murmur heard. Pulmonary/Chest: Effort normal and breath sounds normal. No respiratory distress. She has no wheezes. She has no rales. Musculoskeletal: She exhibits no edema. Neurological: She is alert and oriented to person, place, and time. Skin: Skin is warm and dry.  She is not diaphoretic. Nursing note and vitals reviewed. ASSESSMENT and PLAN    ICD-10-CM ICD-9-CM    1. Essential hypertension I10 401.9    2. Primary osteoarthritis of right knee M17.11 715.16 REFERRAL TO PHYSICAL THERAPY   3. Overweight E66.3 278.02 REFERRAL TO DIETITIAN   4. Malnutrition, unspecified type ([facility] [address] E46 263.9 REFERRAL TO DIETITIAN   5. Fibromyalgia M79.7 729.1 gabapentin (NEURONTIN) 400 mg capsule   6. Xerosis of skin L85.3 706.8 hydrOXYzine HCl (ATARAX) 50 mg tablet        Blood pressure controlled  Knee pain due to osteoarthritis  Continue current plans. PT referral  RD referral  Refills per orders  I have reviewed/discussed the above normal BMI with the patient. I have recommended the following interventions: encourage exercise and prescribed dietary intake . David Kerr Follow-up and Dispositions    · Return in about 3 months (around 7/8/2019) for check weight. Reviewed plan of care. Patient has provided input and agrees with goals.

## 2019-04-08 NOTE — PROGRESS NOTES
Chief Complaint   Patient presents with    Blood Pressure Check     f/u    Knee Pain     rt and discuss dietician      1. Have you been to the ER, urgent care clinic since your last visit? Hospitalized since your last visit? No     2. Have you seen or consulted any other health care providers outside of the [address] [place] [address] since your last visit? Include any pap smears or colon screening.  No

## 2019-04-24 DIAGNOSIS — I10 ESSENTIAL HYPERTENSION: ICD-10-CM

## 2019-04-24 RX ORDER — AMLODIPINE BESYLATE 10 MG/1
10 TABLET ORAL DAILY
Qty: 90 TAB | Refills: 3 | Status: SHIPPED | OUTPATIENT
Start: 2019-04-24 | End: 2020-05-29

## 2019-04-24 NOTE — TELEPHONE ENCOUNTER
Pt called stating she was told at last visit to follow up in 3 months but only given 30 day supply of amlodipine and is now totally out of it. Pt asking for 90 day refill to be sent to local pharmacy.  Porfirio

## 2019-05-15 DIAGNOSIS — I10 ESSENTIAL HYPERTENSION: ICD-10-CM

## 2019-05-19 RX ORDER — METOPROLOL SUCCINATE 25 MG/1
TABLET, EXTENDED RELEASE ORAL
Qty: 180 TAB | Refills: 3 | Status: SHIPPED | OUTPATIENT
Start: 2019-05-19 | End: 2019-06-26 | Stop reason: SDUPTHER

## 2019-06-26 ENCOUNTER — TELEPHONE (OUTPATIENT)
Dept: FAMILY MEDICINE CLINIC | Age: 69
End: 2019-06-26

## 2019-06-26 ENCOUNTER — OFFICE VISIT (OUTPATIENT)
Dept: FAMILY MEDICINE CLINIC | Age: 69
End: 2019-06-26

## 2019-06-26 VITALS
BODY MASS INDEX: 25.44 KG/M2 | RESPIRATION RATE: 18 BRPM | DIASTOLIC BLOOD PRESSURE: 78 MMHG | WEIGHT: 149 LBS | SYSTOLIC BLOOD PRESSURE: 123 MMHG | HEART RATE: 61 BPM | TEMPERATURE: 98.1 F | HEIGHT: 64 IN

## 2019-06-26 DIAGNOSIS — N62 LARGE BREASTS: ICD-10-CM

## 2019-06-26 DIAGNOSIS — Z72.0 TOBACCO ABUSE: ICD-10-CM

## 2019-06-26 DIAGNOSIS — M79.7 FIBROMYALGIA: ICD-10-CM

## 2019-06-26 DIAGNOSIS — L29.9 ITCHING: ICD-10-CM

## 2019-06-26 DIAGNOSIS — F41.9 ANXIETY: ICD-10-CM

## 2019-06-26 DIAGNOSIS — L85.3 XEROSIS OF SKIN: ICD-10-CM

## 2019-06-26 DIAGNOSIS — F39 MOOD DISORDER (HCC): ICD-10-CM

## 2019-06-26 DIAGNOSIS — G89.29 CHRONIC BACK PAIN, UNSPECIFIED BACK LOCATION, UNSPECIFIED BACK PAIN LATERALITY: ICD-10-CM

## 2019-06-26 DIAGNOSIS — M54.9 CHRONIC BACK PAIN, UNSPECIFIED BACK LOCATION, UNSPECIFIED BACK PAIN LATERALITY: ICD-10-CM

## 2019-06-26 DIAGNOSIS — I10 ESSENTIAL HYPERTENSION: Primary | ICD-10-CM

## 2019-06-26 DIAGNOSIS — G62.9 NEUROPATHY: ICD-10-CM

## 2019-06-26 RX ORDER — HYDROXYZINE 50 MG/1
50 TABLET, FILM COATED ORAL
Qty: 270 TAB | Refills: 0 | Status: SHIPPED | OUTPATIENT
Start: 2019-06-26 | End: 2019-12-05 | Stop reason: SDUPTHER

## 2019-06-26 RX ORDER — METOPROLOL SUCCINATE 25 MG/1
75 TABLET, EXTENDED RELEASE ORAL 2 TIMES DAILY
Qty: 540 TAB | Refills: 3 | Status: SHIPPED | OUTPATIENT
Start: 2019-06-26 | End: 2019-11-25 | Stop reason: SDUPTHER

## 2019-06-26 NOTE — PROGRESS NOTES
Chief Complaint   Patient presents with    Medication Refill     pt request 90 day supply    Other     needs note for [facility] for breast reduction (how if affects back and shoulders)    [facility] Dr. Jennifer Seymour Fax # [phone]     1. Have you been to the ER, urgent care clinic since your last visit? Hospitalized since your last visit? No     2. Have you seen or consulted any other health care providers outside of the [address] [place] [address] since your last visit? Include any pap smears or colon screening.  No

## 2019-06-26 NOTE — LETTER
6/26/2019 3:38 PM 
 
Ms. Kathleen Gordon [address] [id number] [address] Marisa Tian Kathleen Gordon is currently under the care of [address]. She was seen in the office today for concern about upper back and neck pain. The patient finds that this is related to her large breast size. I think it is absolutely reasonable to have an evaluation with her breast surgeon with regards to how to proceed and if there is any possibility for reduction surgery for this patient. She has reported to me that she has an upcoming visit with your office in August and that she was requested to have a letter of support for this visit routed to you. I appreciate your care of our mutual patient. If there are questions or concerns please do not hesitate to contact our office.  
 
 
 
Sincerely, 
 
 
Donita Penny MD

## 2019-06-26 NOTE — PATIENT INSTRUCTIONS
Blood pressure well controlled, continue with current management of medications, recommend recheck in about 6 weeks, patient seems concerned about potential for hypo-tension however her blood pressure appears to be normal today She complains of itching and xerosis of skin, she has been taking hydroxyzine. I recommended that she stop taking it all at once and instead take it throughout the day on days when the itching bothers her. Never more than 50 mg at a time She was requesting gabapentin refill however on review of records it appears she was given a 90-day supply with 3 months refills and she will defer this question to her pharmacy With regards to her chronic pain and fibromyalgia is taking the gabapentin as discussed above Her mood disorder is being managed by her psychiatrist and I have encouraged her to continue with this The patient has large breasts and chronic back pain and she feels that they are related and I believe it is warranted to have an evaluation with the breast surgeon for consultation in regards to potential for reduction if that is an appropriate treatment. I did discuss with the patient that the appropriateness would be discussed with the surgeon I recommended that the patient quit smoking

## 2019-06-26 NOTE — PROGRESS NOTES
Family Medicine Acute Visit Progress Note  Patient: Mandi Barahona  1950, 79 y.o., female  Encounter Date: 6/26/2019    ASSESSMENT & PLAN    ICD-10-CM ICD-9-CM    1. Essential hypertension I10 401.9 metoprolol succinate (TOPROL-XL) 25 mg XL tablet   2. Xerosis of skin L85.3 706.8 hydrOXYzine HCl (ATARAX) 50 mg tablet   3. Neuropathy G62.9 355.9    4. Fibromyalgia M79.7 729.1    5. Anxiety F41.9 300.00    6. Itching L29.9 698.9    7. Mood disorder (HCC) F39 296.90    8. Large breasts N62 611.1    9. Chronic back pain, unspecified back location, unspecified back pain laterality M54.9 724.5     G89.29 338.29    10. Tobacco abuse Z72.0 305.1        Orders Placed This Encounter    metoprolol succinate (TOPROL-XL) 25 mg XL tablet     Sig: Take 3 Tabs by mouth two (2) times a day. Dispense:  540 Tab     Refill:  3    hydrOXYzine HCl (ATARAX) 50 mg tablet     Sig: Take 1 Tab by mouth every six (6) hours as needed for Itching. Dispense:  270 Tab     Refill:  0       Patient Instructions   Blood pressure well controlled, continue with current management of medications, recommend recheck in about 6 weeks, patient seems concerned about potential for hypo-tension however her blood pressure appears to be normal today  She complains of itching and xerosis of skin, she has been taking hydroxyzine. I recommended that she stop taking it all at once and instead take it throughout the day on days when the itching bothers her.   Never more than 50 mg at a time  She was requesting gabapentin refill however on review of records it appears she was given a 90-day supply with 3 months refills and she will defer this question to her pharmacy  With regards to her chronic pain and fibromyalgia is taking the gabapentin as discussed above  Her mood disorder is being managed by her psychiatrist and I have encouraged her to continue with this  The patient has large breasts and chronic back pain and she feels that they are related and I believe it is warranted to have an evaluation with the breast surgeon for consultation in regards to potential for reduction if that is an appropriate treatment. I did discuss with the patient that the appropriateness would be discussed with the surgeon  I recommended that the patient quit smoking        CHIEF COMPLAINT  Chief Complaint   Patient presents with    Medication Refill     pt request 90 day supply    Other     needs note for [facility] for breast reduction (how if affects back and shoulders)        Svitlana Royal is a 79 y.o. female presenting today for multiple concerns  TRANSPORT PAPERWORK: going to do exercise she reports, doesn't know where she will go or when she will go but she would like to have transportation filled out so she can go to exercise classes. She wants to do yoga and maybe participate at a silver sneakers program. She has chronic pain and reports she goes to counseling, NA, AA and peer support meetings. MEDICATION REFILLS: patient reports she has very low resources and needs 90 day supplies on medications  WANTS THERAPY ANIMAL: deferring concerns right now about this, but wants to readdress at a future time. BREAST REDUCTION: patient reports she went to the [facility]" breast surgeon. She has talked in the past to Dr Nesha Meyer in the past. She reports she was told insurance often doesn't pay for this but she feels that if maybe she had breast reduction were possible she would have less chronic pain. She feels it is important that she breast fed 4 babies. She is irritated that she \"has a piece of skin hanging below her waist all the time. \" She feels that this could be re-evaluated. Has an appt August 13th with Breast Surgery. She is seeing Dr Christine Morgan there at [facility] and would like a letter sent over. Fax is [phone]. Patient reports she has already spoken with Kristen Russo at that office and she was suggested to have a letter from her PCP office.   Reports compliance with blood pressure medications without any side effects  Following with psychiatry for mood disorders and management of psychiatric medications  Complains of chronic pain, worse when she is resting, better when she takes her mind off of it, especially painful shoulders, upper back and neck which she attributes to her large breasts, she makes note that these used to be larger when she weighed more but she is status post bariatric surgery and has lost a considerable amount of weight. Recently started smoking about 2 months ago. Motivated to quit however    Review of Systems  A 12 point review of systems was negative except as noted here or in the HPI. OBJECTIVE  Visit Vitals  /78   Pulse 61   Temp 98.1 °F (36.7 °C) (Oral)   Resp 18   Ht 5' 4\" (1.626 m)   Wt 149 lb (67.6 kg)   BMI 25.58 kg/m²       Physical Exam   Constitutional: She is oriented to person, place, and time. She appears well-developed. No distress. Appearance of having lost a considerable amount of weight, smells of cigarette smoke   HENT:   Head: Normocephalic and atraumatic. Eyes: Conjunctivae are normal. No scleral icterus. Neck: No thyromegaly present. Cardiovascular: Normal rate and regular rhythm. Exam reveals no gallop and no friction rub. Heart rate 62 on my evaluation   Pulmonary/Chest: Effort normal and breath sounds normal. No respiratory distress. Pendulous breasts   Abdominal: Soft. She exhibits no distension. Musculoskeletal: She exhibits tenderness (Upper back, paraspinal tenderness to palpation and trap tenderness to palpation). Lymphadenopathy:     She has no cervical adenopathy. Neurological: She is alert and oriented to person, place, and time. Skin: Skin is warm and dry. She is not diaphoretic. Psychiatric: She has a normal mood and affect. Her behavior is normal.   Some TD type movements with mouth, scattered thought process   Nursing note and vitals reviewed.       No results found for any visits on 06/26/19. HISTORICAL  PMH, PSH, FHX, SOCHX, ALLERGIES and MES were reviewed and updated today. Shireen Pantoja MD  [facility]  06/26/19 3:14 PM    Portions of this note may have been populated using smart dictation software and may have \"sounds-like\" errors present. Pt was counseled on risks, benefits and alternatives of treatment options. All questions were asked and answered and the patient was agreeable with the treatment plan as outlined.

## 2019-07-31 ENCOUNTER — OFFICE VISIT (OUTPATIENT)
Dept: FAMILY MEDICINE CLINIC | Age: 69
End: 2019-07-31

## 2019-07-31 VITALS
WEIGHT: 149 LBS | RESPIRATION RATE: 18 BRPM | TEMPERATURE: 97.7 F | BODY MASS INDEX: 25.44 KG/M2 | SYSTOLIC BLOOD PRESSURE: 120 MMHG | HEART RATE: 65 BPM | HEIGHT: 64 IN | DIASTOLIC BLOOD PRESSURE: 83 MMHG

## 2019-07-31 DIAGNOSIS — Z13.31 SCREENING FOR DEPRESSION: ICD-10-CM

## 2019-07-31 DIAGNOSIS — Z12.11 SCREEN FOR COLON CANCER: ICD-10-CM

## 2019-07-31 DIAGNOSIS — Z13.39 SCREENING FOR ALCOHOLISM: ICD-10-CM

## 2019-07-31 DIAGNOSIS — Z00.00 MEDICARE ANNUAL WELLNESS VISIT, SUBSEQUENT: ICD-10-CM

## 2019-07-31 NOTE — PATIENT INSTRUCTIONS
Medicare Wellness Visit, Female The best way to live healthy is to have a lifestyle where you eat a well-balanced diet, exercise regularly, limit alcohol use, and quit all forms of tobacco/nicotine, if applicable. Regular preventive services are another way to keep healthy. Preventive services (vaccines, screening tests, monitoring & exams) can help personalize your care plan, which helps you manage your own care. Screening tests can find health problems at the earliest stages, when they are easiest to treat. [facility] follows the current, evidence-based guidelines published by the [facility] ([facility]) when recommending preventive services for our patients. Because we follow these guidelines, sometimes recommendations change over time as research supports it. (For example, mammograms used to be recommended annually. Even though Medicare will still pay for an annual mammogram, the newer guidelines recommend a mammogram every two years for women of average risk.) Of course, you and your doctor may decide to screen more often for some diseases, based on your risk and your health status. Preventive services for you include: - Medicare offers their members a free annual wellness visit, which is time for you and your primary care provider to discuss and plan for your preventive service needs. Take advantage of this benefit every year! 
-All adults over the age of 72 should receive the recommended pneumonia vaccines. Current USPSTF guidelines recommend a series of two vaccines for the best pneumonia protection.  
-All adults should have a flu vaccine yearly and a tetanus vaccine every 10 years. All adults age 61 and older should receive a shingles vaccine once in their lifetime.   
-A bone mass density test is recommended when a woman turns 65 to screen for osteoporosis. This test is only recommended one time, as a screening. Some providers will use this same test as a disease monitoring tool if you already have osteoporosis. -All adults age 38-68 who are overweight should have a diabetes screening test once every three years.  
-Other screening tests and preventive services for persons with diabetes include: an eye exam to screen for diabetic retinopathy, a kidney function test, a foot exam, and stricter control over your cholesterol.  
-Cardiovascular screening for adults with routine risk involves an electrocardiogram (ECG) at intervals determined by your doctor.  
-Colorectal cancer screenings should be done for adults age 54-65 with no increased risk factors for colorectal cancer. There are a number of acceptable methods of screening for this type of cancer. Each test has its own benefits and drawbacks. Discuss with your doctor what is most appropriate for you during your annual wellness visit. The different tests include: colonoscopy (considered the best screening method), a fecal occult blood test, a fecal DNA test, and sigmoidoscopy. -Breast cancer screenings are recommended every other year for women of normal risk, age 54-69. 
-Cervical cancer screenings for women over age 72 are only recommended with certain risk factors.  
-All adults born between [facility] should be screened once for Hepatitis C. Here is a list of your current Health Maintenance items (your personalized list of preventive services) with a due date: 
Health Maintenance Due Topic Date Due  
 DTaP/Tdap/Td  (1 - Tdap) 08/03/1973  Shingles Vaccine (1 of 2) 08/03/2002  Glaucoma Screening   08/03/2017  Pneumococcal Vaccine (1 of 2 - PCV13) 08/03/2017 [facility] Annual Well Visit  11/01/2018  Stool testing for trace blood  07/20/2019

## 2019-07-31 NOTE — PROGRESS NOTES
Chief Complaint   Patient presents with    Annual Wellness Visit    Hypertension     follow up     Per patient she has had a colonoscopy, with in 10 years, in [place].     Per patient Dr. Erica Vazquez completed eye exam this year, will request records

## 2019-07-31 NOTE — PROGRESS NOTES
This is the Subsequent Medicare Annual Wellness Exam, performed 12 months or more after the Initial AWV or the last Subsequent AWV    I have reviewed the patient's medical history in detail and updated the computerized patient record. History     Past Medical History:   Diagnosis Date    Anxiety     Anxiety     Bipolar 2 disorder ([facility] [address] 09/2018    Blurred vision     Depression     Fibromyalgia     Gastrointestinal disorder     Hemorrhoids     History of bleeding peptic ulcer     with perforation. s/p repair 9/2016. 2nd bleed 6/2017    History of smoking     Hx of hepatitis C     pt states she no longer    Psychiatric disorder     Psychotic disorder ([address] [address]     PTSD (post-traumatic stress disorder)     Stress       Past Surgical History:   Procedure Laterality Date    HX CHOLECYSTECTOMY      HX GASTRIC BYPASS      HX OTHER SURGICAL      PEG tube     Current Outpatient Medications   Medication Sig Dispense Refill    metoprolol succinate (TOPROL-XL) 25 mg XL tablet Take 3 Tabs by mouth two (2) times a day. (Patient taking differently: Take 50 mg by mouth two (2) times a day.) 540 Tab 3    hydrOXYzine HCl (ATARAX) 50 mg tablet Take 1 Tab by mouth every six (6) hours as needed for Itching. 270 Tab 0    amLODIPine (NORVASC) 10 mg tablet Take 1 Tab by mouth daily. 90 Tab 3    gabapentin (NEURONTIN) 400 mg capsule Take 2 Caps by mouth three (3) times daily. 540 Cap 3    QUEtiapine (SEROQUEL) 50 mg tablet Take 15 mg by mouth nightly.  valproic acid, as sodium salt, (DEPAKENE) 250 mg/5 mL (5 mL) soln oral solution Take 250 mg by mouth three (3) times daily.  multivitamin (ONE A DAY) tablet Take 1 Tab by mouth daily.  Vitamin B Complex No.12-Niacin 50 mg/15 mL liqd Take  by mouth.  senna leaf tea Take  by mouth.        No Known Allergies  Family History   Problem Relation Age of Onset    No Known Problems Mother      Social History     Tobacco Use    Smoking status: Former Smoker Packs/day: 0.25    Smokeless tobacco: Never Used    Tobacco comment: quit 3 months ago   Substance Use Topics    Alcohol use: No     Comment: quit drinking one month prior     Patient Active Problem List   Diagnosis Code    Perforated ulcer ([facility] K27.5    Malnutrition ([facility] E46    Alcohol abuse F10.10    Protein-calorie malnutrition, severe ([facility] E43    Essential hypertension I10    History of mammogram Z92.89    History of Papanicolaou smear of cervix Z92.89    Impaired cognition R41.89    Anxiety F41.9    PTSD (post-traumatic stress disorder) F43.10    Cannabis abuse F12.10    Non-compliance with treatment Z91.19    Personality disorder (HCC) F60.9    Fibromyalgia M79.7    Neuropathy G62.9    Mood disorder (HCC) F39       Depression Risk Factor Screening:     3 most recent PHQ Screens 6/26/2019   PHQ Not Done -   Little interest or pleasure in doing things Not at all   Feeling down, depressed, irritable, or hopeless Not at all   Total Score PHQ 2 0     Alcohol Risk Factor Screening: You do not drink alcohol or very rarely. 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 Activities of Daily Living  The home contains: rugs  Patient needs help with:  transportation and managing money   Has a mental health skill builder, they help to manage money and visit her 2x weekly, check her home and feels she \"does not need to be pushed to try to get a job, it upsets me. \"    Fall Risk  Fall Risk Assessment, last 12 mths 7/31/2019   Able to walk? Yes   Fall in past 12 months?  No       Abuse Screen  Patient is not abused    Cognitive Screening   Evaluation of Cognitive Function:  Has your family/caregiver stated any concerns about your memory: no  Normal  Some  [address] over the rainbow\"  So  Sewing  Sew  Sometimes  [place]    Patient Care Team   Patient Care Team:  Marina Vasquez MD as PCP - General (Family Practice)  Brock Tapia MD as Surgeon (General Surgery)  Filippo Figueroa LPN as Ambulatory Care Navigator [facility])    Assessment/Plan   Education and counseling provided:  Are appropriate based on today's review and evaluation  End-of-Life planning (with patient's consent)  Pneumococcal Vaccine  Influenza Vaccine  Screening Mammography  Screening Pap and pelvic (covered once every 2 years)  Colorectal cancer screening tests  Cardiovascular screening blood test  Screening for glaucoma  Diabetes screening test    Diagnoses and all orders for this visit:    1. Medicare annual wellness visit, subsequent    2. Screening for alcoholism  -     MD ANNUAL ALCOHOL SCREEN 15 MIN    3. Screening for depression  -     DEPRESSION SCREEN ANNUAL    4. Screen for colon cancer  -     OCCULT BLOOD IMMUNOASSAY,DIAGNOSTIC    Other orders  -     diphtheria-pertussis, acellular,-tetanus (ACELL) 2.5-8-5 Lf-mcg-Lf/0.5mL susp susp; 0.5 mL by IntraMUSCular route once for 1 dose.  -     varicella-zoster recombinant, PF, (SHINGRIX) 50 mcg/0.5 mL susr injection; 0.5 mL by IntraMUSCular route once for 1 dose. -     pneumococcal 13 osiris conj dip (PREVNAR-13) 0.5 mL syrg injection; 0.5 mL by IntraMUSCular route once for 1 dose.         Health Maintenance Due   Topic Date Due    DTaP/Tdap/Td series (1 - Tdap) 08/03/1973    Shingrix Vaccine Age 50> (1 of 2) 08/03/2002    GLAUCOMA SCREENING Q2Y  08/03/2017    Pneumococcal 65+ years (1 of 2 - PCV13) 08/03/2017    MEDICARE YEARLY EXAM  11/01/2018    FOBT Q 1 YEAR AGE 50-75  07/20/2019

## 2019-07-31 NOTE — ACP (ADVANCE CARE PLANNING)
Advance Care Planning (ACP) Provider Note - Comprehensive     Date of ACP Conversation: 07/31/19  Persons included in Conversation:  patient  Length of ACP Conversation in minutes:  16 minutes    Authorized Decision Maker (if patient is incapable of making informed decisions): This person is:  sonwayne, lives in [address] [place] [address] for ALL Patients with Decision Making Capacity:   Importance of advance care planning, including choosing a healthcare agent to communicate patient's healthcare decisions if patient lost the ability to make decisions, such as after a sudden illness or accident  Understanding of the healthcare agent role was assessed and information provided  Exploration of values, goals, and preferences if recovery is not expected, even with continued medical treatment in the event of: Imminent death  Severe, permanent brain injury  \"In these circumstances, what matters most to you? \"  \"that's a weird question--if we are talking about 3 months then you can pull the plugs, then i'd need to have flat brain waves for 3 months. \"  \"What, if any, treatments would you want to avoid? \" none  Opportunity offered to explore how cultural, Orthodoxy, spiritual, or personal beliefs would affect decisions for future care    Adventism, would want to go to confession and would want last rights from a       For Serious or Chronic Illness:  Understanding of medical condition    Understanding of CPR, goals and expected outcomes, benefits and burdens discussed. Information on CPR success rates provided (e.g. for CPR in hospital, survival to d/c at two weeks is 22%, for chronically ill or elderly/frail survival is less than 3%); Individual asked to communicate understanding of information in his/her own words.     Interventions Provided:  Recommended completion of Advance Directive form after review of ACP materials and conversation with prospective healthcare agent   Recommended communicating the plan and making copies for the healthcare agent, personal physician, and others as appropriate (e.g., health system)

## 2019-08-02 ENCOUNTER — TELEPHONE (OUTPATIENT)
Dept: FAMILY MEDICINE CLINIC | Age: 69
End: 2019-08-02

## 2019-08-02 DIAGNOSIS — I10 ESSENTIAL HYPERTENSION: ICD-10-CM

## 2019-08-02 RX ORDER — METOPROLOL SUCCINATE 25 MG/1
75 TABLET, EXTENDED RELEASE ORAL 2 TIMES DAILY
Qty: 540 TAB | Refills: 3 | Status: CANCELLED | OUTPATIENT
Start: 2019-08-02 | End: 2020-08-01

## 2019-08-02 NOTE — TELEPHONE ENCOUNTER
Please let the patient know that I have reviewed her record and it appears that Dr. Tabatha Guidry is prescribing her hydroxyzine and although I had sent the prescription in June 26 it may have been declined because he had prescribed it may 20th in both of us had given sufficient supplies. She will need to stick with one doctor prescribing her psychoactive medications and hydroxyzine which is treating her anxiety should be prescribed by her psychiatrist    With regards to her gabapentin I spoke to the pharmacist and this is already available for her    The Seroquel is act accurately listed in the dose and frequency as 15 mg at bedtime--I do not know how she is only taking 15 mg of Seroquel because Seroquel comes in 25, 50, 100 mg dosing. I reviewed her pharmacy record and she is on 25 mg of Seroquel not 15.   This should be managed again by her psychiatrist    remove the Depakote from her med list but she needs to have these medications managed by her psychiatrist--I can see that her Depakote was dispensed to her on May 20, 2019    The pharamcist told me that the metoprolol is already available to her    Please have her speak to her pharmacist directly if she has further questions

## 2019-08-02 NOTE — TELEPHONE ENCOUNTER
Called and spoke with pt. Per patient Dr. Vena Severs is whom originally prescribed the Hydroxyzine for her itching. Pt states she is not taking this medication for anxiety. Pt states she is taking 25 Mg of Seroquel, and Dr. Geovanni Mercado is prescribing this for her. Discuss additional medications with pt, per orders, and those medication were correct. Asked pt if I can do anything else for her. Pt states she needs a prior authorization for her hydroxyzine medication. Pt states she has been on this medication for a long time and pharmacy is not giving it to her. Prior Authorization has been submitted to cover my meds and am awaiting decision.

## 2019-08-02 NOTE — TELEPHONE ENCOUNTER
Ellen Bal called from [facility] requesting to speak with nurse regarding PA for pt's medication and was advised Dr. Tanisha Cameron and her nurse have left for the day, agreed to hold while another pt was being assisted but hung up.  Porfirio

## 2019-08-05 NOTE — TELEPHONE ENCOUNTER
PA has been denied. Called and spoke with pt, and she has been advised and states understanding that RX was denied. Pt advised to contact insurance with additional questions or concerns, pt agrees with plan.

## 2019-08-05 NOTE — TELEPHONE ENCOUNTER
I suspect the reason it is being flagged is two doctors tried to prescribe in one month, her psychiatrist it appears had taken over prescribing, we can contact insurance to see what needs to be taken care of but only one doctor should rx and its OK to leave to psychaitry because it does have a sig effect on anxiety

## 2019-08-06 ENCOUNTER — TELEPHONE (OUTPATIENT)
Dept: FAMILY MEDICINE CLINIC | Age: 69
End: 2019-08-06

## 2019-08-06 ENCOUNTER — DOCUMENTATION ONLY (OUTPATIENT)
Dept: FAMILY MEDICINE CLINIC | Age: 69
End: 2019-08-06

## 2019-08-06 NOTE — TELEPHONE ENCOUNTER
Pt cancelled appointment for 8/15/19, unable to get transportation. She would like to speak with the nurse about issues with the prior authorization for her medications. Asking about re-submitting?   Please call [phone]

## 2019-08-06 NOTE — TELEPHONE ENCOUNTER
Called and spoke with pt. Pt wanted to know why her Hydroxyzine medication was not approved. Pt advised at this time, I do not know why. Pt advised I completed her PA however, I have not yet seen the fax documentation of the reason for the denial.     Pt asked for PA to be submitted again due to medication going to cost her $11.00. Pt advised at this time it would not be beneficially for me to complete PA again due to there not being a change to the information that was already submitted. Pt states understanding. Pt advised to contact her insurance carrier with concerns over reasoning for denial. Pt agrees with plan.

## 2019-08-06 NOTE — PROGRESS NOTES
rewviewed prior auth paperwork that has come from [facility]  Hydroxyzine denied because patient has not tried levocetirizine. If she is indeed taking atarax (hydroxyzine) only for itching and dry skin she must try this medication first according to her insurance. She may do well with it and if she does it would be covered from what I can see. If not then we may be able to get an exemption. If hydroxyzine si being used for anxiety should be managed by psychiatry. We will submit PA but it may be declined and then I recommend trial of levocetirizine or alternately cash price using good RX for hydroxyzine. A 12 point review of systems was negative except as noted here or in the HPI.

## 2019-08-07 NOTE — PROGRESS NOTES
Called and spoke with pt, and she has been advised and states understanding of this.    Pt states she martin snot want to change her RX, that she will pay the $11.00 out of pocket cost.

## 2019-08-13 ENCOUNTER — TELEPHONE (OUTPATIENT)
Dept: FAMILY MEDICINE CLINIC | Age: 69
End: 2019-08-13

## 2019-08-13 NOTE — TELEPHONE ENCOUNTER
Called and spoke with pt, and she has been advised and states understanding of Dr. Field Memos recommendations. Pt states she is eating food now and will call office back to schedule an appointment.

## 2019-08-13 NOTE — TELEPHONE ENCOUNTER
Pt called office and states she is feeling light headed and believes it is coming from her Metoprolol medication. Pt states she is taking 25 MG, 3 tablets twice daily. Pt states this is the first time she has had these symptoms, but does not have a home blood pressure monitor so was unable to advise of current blood pressure reading. Asked pt If she has ate or drank anything today and pt states she has not drank anything today but eat some berries. Pt advised to eat some protein and drink water for hydration and I will call her back with recommendation from provider.

## 2019-08-13 NOTE — TELEPHONE ENCOUNTER
I am glad to address her concerns and a visit  She has been on this medication for very long time and it has not changed recently so it would be unlikely for this medication to suddenly be causing her symptoms  I do recommend that she eat and drink an adequate amount  There are several viral illnesses going around and certainly that could be contributing as well  Recommend an appointment for this, she can see either myself or Dr. Agustin Fung if 1 of us as available

## 2019-08-15 ENCOUNTER — TELEPHONE (OUTPATIENT)
Dept: FAMILY MEDICINE CLINIC | Age: 69
End: 2019-08-15

## 2019-08-15 NOTE — TELEPHONE ENCOUNTER
----- Message from Joni Gentile sent at 8/15/2019  4:43 PM EDT -----  Regarding: Leticia Adkins - MD/Telephone   Pt is requesting permission to send records to a different doctor in [place] so she can be closer to home, she did not have the name of the practice however she will call back when she finds a closer practice.   Pt's Best Contact Number: [phone]

## 2019-08-16 NOTE — TELEPHONE ENCOUNTER
Called and spoke with pt. Pt advised to have new provider, once she establishes with one, to request records from office, and we will submit to our medical records department. Pt agrees with plan.

## 2019-08-16 NOTE — TELEPHONE ENCOUNTER
Absolutely agree that a doctor more convenient to her is appropriate  She can have her new docs office req records through fax

## 2019-08-21 LAB — HEMOCCULT STL QL IA: NEGATIVE

## 2019-11-07 ENCOUNTER — OFFICE VISIT (OUTPATIENT)
Dept: FAMILY MEDICINE CLINIC | Age: 69
End: 2019-11-07

## 2019-11-07 VITALS
BODY MASS INDEX: 25.44 KG/M2 | TEMPERATURE: 98 F | WEIGHT: 149 LBS | HEIGHT: 64 IN | DIASTOLIC BLOOD PRESSURE: 87 MMHG | RESPIRATION RATE: 18 BRPM | HEART RATE: 54 BPM | SYSTOLIC BLOOD PRESSURE: 140 MMHG

## 2019-11-07 DIAGNOSIS — F39 MOOD DISORDER (HCC): ICD-10-CM

## 2019-11-07 DIAGNOSIS — M79.7 FIBROMYALGIA: ICD-10-CM

## 2019-11-07 DIAGNOSIS — F43.10 PTSD (POST-TRAUMATIC STRESS DISORDER): ICD-10-CM

## 2019-11-07 DIAGNOSIS — I10 ESSENTIAL HYPERTENSION: Primary | ICD-10-CM

## 2019-11-07 DIAGNOSIS — M06.9 RHEUMATOID ARTHRITIS, INVOLVING UNSPECIFIED SITE, UNSPECIFIED RHEUMATOID FACTOR PRESENCE: ICD-10-CM

## 2019-11-07 DIAGNOSIS — F60.9 PERSONALITY DISORDER (HCC): ICD-10-CM

## 2019-11-07 DIAGNOSIS — Z23 ENCOUNTER FOR IMMUNIZATION: ICD-10-CM

## 2019-11-07 DIAGNOSIS — F41.9 ANXIETY: ICD-10-CM

## 2019-11-07 DIAGNOSIS — M17.11 PRIMARY OSTEOARTHRITIS OF RIGHT KNEE: ICD-10-CM

## 2019-11-07 RX ORDER — METAXALONE 400 MG/1
400 TABLET ORAL
Qty: 90 TAB | Refills: 3 | Status: SHIPPED | OUTPATIENT
Start: 2019-11-07 | End: 2019-11-13 | Stop reason: ALTCHOICE

## 2019-11-07 RX ORDER — AMITRIPTYLINE HYDROCHLORIDE 10 MG/1
10 TABLET, FILM COATED ORAL
Qty: 30 TAB | Refills: 1 | Status: SHIPPED | OUTPATIENT
Start: 2019-11-07 | End: 2019-11-25 | Stop reason: SDUPTHER

## 2019-11-07 NOTE — PROGRESS NOTES
HISTORY OF PRESENT ILLNESS  Laura Corbin is a 81 y.o. female. Patient presents with:  Hypertension: follow up   Referral Follow Up: psychiatry- povider referral and skills building through behavior health   Medication Evaluation: pt would like to increase Gabapentin to 800 MG 4 times daily     Laura Corbin is here to follow up on their HTN. This is a chronic problem. The problem occurs constantly and is worse. The symptoms are relieved by Toprol XL, Norvasc, which is/are working moderately to significantly. She requests referrals to Psychiatry and counseling for her mood disorder, PTSD and personality disorder. Evidently, she has only been approved for 6 hours of skill building a week and she needs more. She is asking for the maximum amount, and feels she needs 6 more. Also, she is taking gabapentin for fibromyalgia and was previously on 800 mg QID. She was told she cannot take Tylenol and Advil due to her gastric bypass and she is in pain. Skelaxin has helped and she requests a prescription. She requests lab work. There is a history of anemia and Rheumatoid Arthritis. Also, the osteoarthritis in her right knee is worse. Review of Systems   Constitutional: Negative for weight loss. No weight gain   Eyes: Negative for blurred vision. Respiratory: Negative for shortness of breath. Cardiovascular: Negative for chest pain and leg swelling. Gastrointestinal: Negative for abdominal pain. Neurological: Negative for dizziness, sensory change, speech change, focal weakness and headaches. Psychiatric/Behavioral: Negative for depression, substance abuse and suicidal ideas. The patient has insomnia. The patient is not nervous/anxious.         Visit Vitals  /87   Pulse (!) 54   Temp 98 °F (36.7 °C) (Oral)   Resp 18   Ht 5' 4\" (1.626 m)   Wt 149 lb (67.6 kg)   BMI 25.58 kg/m²     BP Readings from Last 3 Encounters:   07/31/19 120/83   06/26/19 123/78   04/08/19 130/82 Physical Exam   Constitutional: She is oriented to person, place, and time. She appears well-developed and well-nourished. No distress. Cardiovascular: Normal rate, regular rhythm and normal heart sounds. Exam reveals no gallop and no friction rub. No murmur heard. Pulmonary/Chest: Effort normal and breath sounds normal. No respiratory distress. She has no wheezes. She has no rales. Musculoskeletal: She exhibits edema. Trace RLE edema, none on the left. Neurological: She is alert and oriented to person, place, and time. Skin: Skin is warm and dry. She is not diaphoretic. Psychiatric: She has a normal mood and affect. Her speech is normal. Thought content normal. Inappropriate: rumination. She is agitated. Nursing note and vitals reviewed. ASSESSMENT and PLAN    ICD-10-CM ICD-9-CM    1. Essential hypertension P03 046.6 METABOLIC PANEL, BASIC   2. Mood disorder (HCC) F39 296.90 REFERRAL TO PSYCHIATRY      REFERRAL TO BEHAVIORAL HEALTH   3. PTSD (post-traumatic stress disorder) F43.10 309.81 REFERRAL TO PSYCHIATRY      REFERRAL TO BEHAVIORAL HEALTH   4. Personality disorder (HCC) F60.9 301.9 REFERRAL TO PSYCHIATRY      REFERRAL TO BEHAVIORAL HEALTH   5. Anxiety F41.9 300.00    6. Fibromyalgia M79.7 729.1 amitriptyline (ELAVIL) 10 mg tablet      metaxalone (SKELAXIN) 400 mg tablet   7. Rheumatoid arthritis, involving unspecified site, unspecified rheumatoid factor presence (HCC) M06.9 714.0 CBC WITH AUTOMATED DIFF      METABOLIC PANEL, COMPREHENSIVE      RPR W/REFLEX TITER AND TREPONEMA ABS      SED RATE (ESR)      RHEUMATOID FACTOR, QL   8. Primary osteoarthritis of right knee M17.11 715.16 XR KNEE RT MIN 4 V   9.  Encounter for immunization Z23 V03.89 INFLUENZA VACCINE INACTIVATED (IIV), SUBUNIT, ADJUVANTED, IM      ADMIN INFLUENZA VIRUS VAC        Blood pressure elevated  In dire need of mental health services, agree with the extra 6 hours of skill building  Needing better relief for her fibromyalgia, on the above recommended dose of gabapentin  Patient gives history of Rheumatoid Arthritis  Worsening knee pain  Labs per orders. Psychiatry and behavioral health referrals  Add Elavil  Knee x-ray  May take Tylenol prn  Skelaxin prn      Follow-up and Dispositions    · Return in about 1 month (around 12/7/2019) for fibromyalgia, blood pressure. Reviewed plan of care. Patient has provided input and agrees with goals.

## 2019-11-07 NOTE — PROGRESS NOTES
Chief Complaint   Patient presents with    Hypertension     follow up     Referral Follow Up     psychiatry- meseret referral and skills building through behavior health     Medication Evaluation     pt would like to increase Gabapentin to 800 MG 4 times daily

## 2019-11-08 ENCOUNTER — TELEPHONE (OUTPATIENT)
Dept: SURGERY | Age: 69
End: 2019-11-08

## 2019-11-08 ENCOUNTER — TELEPHONE (OUTPATIENT)
Dept: FAMILY MEDICINE CLINIC | Age: 69
End: 2019-11-08

## 2019-11-08 LAB
ALBUMIN SERPL-MCNC: 4.3 G/DL (ref 3.6–4.8)
ALBUMIN/GLOB SERPL: 1.5 {RATIO} (ref 1.2–2.2)
ALP SERPL-CCNC: 74 IU/L (ref 39–117)
ALT SERPL-CCNC: 21 IU/L (ref 0–32)
AST SERPL-CCNC: 29 IU/L (ref 0–40)
BASOPHILS # BLD AUTO: 0 X10E3/UL (ref 0–0.2)
BASOPHILS NFR BLD AUTO: 1 %
BILIRUB SERPL-MCNC: 0.4 MG/DL (ref 0–1.2)
BUN SERPL-MCNC: 17 MG/DL (ref 8–27)
BUN/CREAT SERPL: 20 (ref 12–28)
CALCIUM SERPL-MCNC: 9.8 MG/DL (ref 8.7–10.3)
CHLORIDE SERPL-SCNC: 103 MMOL/L (ref 96–106)
CO2 SERPL-SCNC: 25 MMOL/L (ref 20–29)
CREAT SERPL-MCNC: 0.84 MG/DL (ref 0.57–1)
EOSINOPHIL # BLD AUTO: 0.1 X10E3/UL (ref 0–0.4)
EOSINOPHIL NFR BLD AUTO: 1 %
ERYTHROCYTE [DISTWIDTH] IN BLOOD BY AUTOMATED COUNT: 14.4 % (ref 12.3–15.4)
ERYTHROCYTE [SEDIMENTATION RATE] IN BLOOD BY WESTERGREN METHOD: 11 MM/HR (ref 0–40)
GLOBULIN SER CALC-MCNC: 2.8 G/DL (ref 1.5–4.5)
GLUCOSE SERPL-MCNC: 106 MG/DL (ref 65–99)
HCT VFR BLD AUTO: 37.4 % (ref 34–46.6)
HGB BLD-MCNC: 12.1 G/DL (ref 11.1–15.9)
IMM GRANULOCYTES # BLD AUTO: 0 X10E3/UL (ref 0–0.1)
IMM GRANULOCYTES NFR BLD AUTO: 0 %
LYMPHOCYTES # BLD AUTO: 3.1 X10E3/UL (ref 0.7–3.1)
LYMPHOCYTES NFR BLD AUTO: 48 %
MCH RBC QN AUTO: 28 PG (ref 26.6–33)
MCHC RBC AUTO-ENTMCNC: 32.4 G/DL (ref 31.5–35.7)
MCV RBC AUTO: 87 FL (ref 79–97)
MONOCYTES # BLD AUTO: 0.5 X10E3/UL (ref 0.1–0.9)
MONOCYTES NFR BLD AUTO: 8 %
NEUTROPHILS # BLD AUTO: 2.7 X10E3/UL (ref 1.4–7)
NEUTROPHILS NFR BLD AUTO: 42 %
PLATELET # BLD AUTO: 256 X10E3/UL (ref 150–450)
POTASSIUM SERPL-SCNC: 5 MMOL/L (ref 3.5–5.2)
PROT SERPL-MCNC: 7.1 G/DL (ref 6–8.5)
RBC # BLD AUTO: 4.32 X10E6/UL (ref 3.77–5.28)
RHEUMATOID FACT SERPL-ACNC: 21.4 IU/ML (ref 0–13.9)
RPR SER QL: NON REACTIVE
SODIUM SERPL-SCNC: 139 MMOL/L (ref 134–144)
WBC # BLD AUTO: 6.4 X10E3/UL (ref 3.4–10.8)

## 2019-11-08 NOTE — TELEPHONE ENCOUNTER
----- Message from Ghazal Morales sent at 11/8/2019  8:55 AM EST -----  Regarding: Dr. Geoffrey Reyez Message/Vendor Calls    Caller's first and last name:Pt      Reason for call: Requesting a call back, stated requesting home health care and Rx Suboxone.     Callback required yes/no and why:Yes      Best contact number(s):[phone]      Details to clarify the request:      Ghazal Morales

## 2019-11-08 NOTE — TELEPHONE ENCOUNTER
Old patient of Eugenio Loaiza. Had Gastric in [facility]. Has questions about  Over the counter meds. What she can and can not take. [phone].

## 2019-11-10 ENCOUNTER — TELEPHONE (OUTPATIENT)
Dept: FAMILY MEDICINE CLINIC | Age: 69
End: 2019-11-10

## 2019-11-10 DIAGNOSIS — M05.9 RHEUMATOID ARTHRITIS WITH POSITIVE RHEUMATOID FACTOR, INVOLVING UNSPECIFIED SITE (HCC): Primary | ICD-10-CM

## 2019-11-11 ENCOUNTER — TELEPHONE (OUTPATIENT)
Dept: FAMILY MEDICINE CLINIC | Age: 69
End: 2019-11-11

## 2019-11-11 ENCOUNTER — TELEPHONE (OUTPATIENT)
Dept: SURGERY | Age: 69
End: 2019-11-11

## 2019-11-11 DIAGNOSIS — M79.7 FIBROMYALGIA: ICD-10-CM

## 2019-11-11 DIAGNOSIS — G62.9 NEUROPATHY: Primary | ICD-10-CM

## 2019-11-11 NOTE — TELEPHONE ENCOUNTER
Pt's insurance, [employer], faxed over form advising pt's Zamzam Ty is not covered on their formulary. Pt has been given a temporary supply.

## 2019-11-11 NOTE — TELEPHONE ENCOUNTER
[phone]. Patient of Ann-Marie Pretty had Gastric. Needs to know what over the counter meds she can take.   Also has problems with Ulcers/fs

## 2019-11-11 NOTE — TELEPHONE ENCOUNTER
Please call patient and let her know her rheumatoid factor is high.   Does she have a rheumatologist?

## 2019-11-11 NOTE — TELEPHONE ENCOUNTER
Called pt, and left a voice message, asking that she call the office back in regards to her medication when she is able.

## 2019-11-11 NOTE — TELEPHONE ENCOUNTER
Called and spoke with pt.  Pt has been advised and states understanding of her results, but per pt, she does not see a rheumatologist.

## 2019-11-13 RX ORDER — TIZANIDINE 2 MG/1
2 TABLET ORAL
Qty: 90 TAB | Refills: 4 | Status: SHIPPED | OUTPATIENT
Start: 2019-11-13 | End: 2019-12-05 | Stop reason: SDUPTHER

## 2019-11-25 DIAGNOSIS — M79.7 FIBROMYALGIA: ICD-10-CM

## 2019-11-25 DIAGNOSIS — I10 ESSENTIAL HYPERTENSION: ICD-10-CM

## 2019-11-26 RX ORDER — METOPROLOL SUCCINATE 25 MG/1
TABLET, EXTENDED RELEASE ORAL
Qty: 180 TAB | Refills: 0 | Status: SHIPPED | OUTPATIENT
Start: 2019-11-26 | End: 2020-05-14 | Stop reason: SDUPTHER

## 2019-11-26 RX ORDER — AMITRIPTYLINE HYDROCHLORIDE 10 MG/1
TABLET, FILM COATED ORAL
Qty: 90 TAB | Refills: 3 | Status: SHIPPED | OUTPATIENT
Start: 2019-11-26 | End: 2019-12-05 | Stop reason: SDUPTHER

## 2019-12-05 ENCOUNTER — TELEPHONE (OUTPATIENT)
Dept: FAMILY MEDICINE CLINIC | Age: 69
End: 2019-12-05

## 2019-12-05 ENCOUNTER — OFFICE VISIT (OUTPATIENT)
Dept: FAMILY MEDICINE CLINIC | Age: 69
End: 2019-12-05

## 2019-12-05 VITALS
RESPIRATION RATE: 18 BRPM | DIASTOLIC BLOOD PRESSURE: 84 MMHG | BODY MASS INDEX: 24.92 KG/M2 | HEIGHT: 64 IN | SYSTOLIC BLOOD PRESSURE: 124 MMHG | TEMPERATURE: 99.4 F | WEIGHT: 146 LBS | HEART RATE: 62 BPM

## 2019-12-05 DIAGNOSIS — M79.7 FIBROMYALGIA: Primary | ICD-10-CM

## 2019-12-05 DIAGNOSIS — M05.9 RHEUMATOID ARTHRITIS WITH POSITIVE RHEUMATOID FACTOR, INVOLVING UNSPECIFIED SITE (HCC): ICD-10-CM

## 2019-12-05 DIAGNOSIS — G62.9 NEUROPATHY: ICD-10-CM

## 2019-12-05 DIAGNOSIS — I10 ESSENTIAL HYPERTENSION: ICD-10-CM

## 2019-12-05 DIAGNOSIS — L85.3 XEROSIS OF SKIN: ICD-10-CM

## 2019-12-05 RX ORDER — AMITRIPTYLINE HYDROCHLORIDE 10 MG/1
TABLET, FILM COATED ORAL
Qty: 30 TAB | Refills: 2 | Status: SHIPPED | OUTPATIENT
Start: 2019-12-05 | End: 2019-12-16 | Stop reason: SDUPTHER

## 2019-12-05 RX ORDER — GABAPENTIN 400 MG/1
800 CAPSULE ORAL 3 TIMES DAILY
Qty: 540 CAP | Refills: 1 | Status: SHIPPED | OUTPATIENT
Start: 2019-12-05 | End: 2019-12-16 | Stop reason: SDUPTHER

## 2019-12-05 RX ORDER — HYDROXYZINE 50 MG/1
50 TABLET, FILM COATED ORAL
Qty: 270 TAB | Refills: 3 | Status: SHIPPED | OUTPATIENT
Start: 2019-12-05 | End: 2019-12-09

## 2019-12-05 RX ORDER — TIZANIDINE 2 MG/1
2 TABLET ORAL
Qty: 90 TAB | Refills: 4 | Status: SHIPPED | OUTPATIENT
Start: 2019-12-05 | End: 2020-04-10 | Stop reason: ALTCHOICE

## 2019-12-05 NOTE — PROGRESS NOTES
HISTORY OF PRESENT ILLNESS  Ilana Abad is a 81 y.o. female. Patient presents with:  Fibromyalgia: 1 mo f/u; discuss handicap sticker  Medication Refill    Her fibromyalgia is better. At her last visit, I started her on Elavil, which is working well  Her weight is stable. She needs a handicapped sticker because she cannot even walk a block due to her RA. She uses a cane or walker. Also, she needs a Rheumatology referral.    She needs a refill on her Atarax and Elavil. Ilana Abad is here to follow up on their HTN. This is a chronic problem. The problem occurs constantly and is improved. The symptoms are relieved by Toprol XL, which is/are working well. Review of Systems   Constitutional: Negative for weight loss. No weight gain   Eyes: Negative for blurred vision. Respiratory: Negative for shortness of breath. Cardiovascular: Negative for chest pain and leg swelling. Gastrointestinal: Negative for abdominal pain. Neurological: Negative for dizziness, sensory change, speech change, focal weakness and headaches. Psychiatric/Behavioral: Negative for depression and suicidal ideas. Visit Vitals  /84   Pulse 62   Temp 99.4 °F (37.4 °C) (Oral)   Resp 18   Ht 5' 4\" (1.626 m)   Wt 146 lb (66.2 kg)   BMI 25.06 kg/m²     Physical Exam  Vitals signs and nursing note reviewed. Constitutional:       General: She is not in acute distress. Appearance: She is well-developed. She is not diaphoretic. Cardiovascular:      Rate and Rhythm: Normal rate and regular rhythm. Heart sounds: Normal heart sounds. No murmur. No friction rub. No gallop. Pulmonary:      Effort: Pulmonary effort is normal. No respiratory distress. Breath sounds: Normal breath sounds. No wheezing or rales. Musculoskeletal:      Comments: Uses a cane   Skin:     General: Skin is warm and dry. Neurological:      Mental Status: She is alert and oriented to person, place, and time. Psychiatric:         Mood and Affect: Mood normal.         Behavior: Behavior normal.         Thought Content: Thought content normal.         Judgment: Judgment normal.         ASSESSMENT and PLAN    ICD-10-CM ICD-9-CM    1. Fibromyalgia M79.7 729.1 amitriptyline (ELAVIL) 10 mg tablet      gabapentin (NEURONTIN) 400 mg capsule      tiZANidine (ZANAFLEX) 2 mg tablet   2. Essential hypertension I10 401.9    3. Rheumatoid arthritis with positive rheumatoid factor, involving unspecified site (HCC) M05.9 714.0 REFERRAL TO RHEUMATOLOGY   4. Xerosis of skin L85.3 706.8 hydrOXYzine HCl (ATARAX) 50 mg tablet   5. Neuropathy G62.9 355.9 tiZANidine (ZANAFLEX) 2 mg tablet        Fibromyalgia doing much better  Blood pressure controlled  Needs a Rheumatologist for her Rheumatoid Arthritis, recent RF mildly elevated  Continue current plans. Rheumatology referral  Refills per orders    Follow-up and Dispositions    · Return in about 3 months (around 3/5/2020) for fibromyalgia. Reviewed plan of care. Patient has provided input and agrees with goals.

## 2019-12-05 NOTE — TELEPHONE ENCOUNTER
Patent's pharmacist called to see if her Hydroxyzine can be changed to capsules instead of tabs because her insurance will not cover tabs, also wants to change to BID instead of every 6 hours because insurance will not cover, pharmacist states this is how patient was previously taking.

## 2019-12-05 NOTE — PROGRESS NOTES
Chief Complaint   Patient presents with    Fibromyalgia     1 mo f/u; discuss handicap sticker    Medication Refill     1. Have you been to the ER, urgent care clinic since your last visit? Hospitalized since your last visit? No     2. Have you seen or consulted any other health care providers outside of the [address] [place] [address] since your last visit? Include any pap smears or colon screening.  No

## 2019-12-09 DIAGNOSIS — F41.9 ANXIETY: Primary | ICD-10-CM

## 2019-12-09 RX ORDER — HYDROXYZINE PAMOATE 50 MG/1
50 CAPSULE ORAL
Qty: 180 CAP | Refills: 3 | Status: SHIPPED | OUTPATIENT
Start: 2019-12-09 | End: 2020-07-07 | Stop reason: SDUPTHER

## 2019-12-16 DIAGNOSIS — M79.7 FIBROMYALGIA: ICD-10-CM

## 2019-12-16 NOTE — TELEPHONE ENCOUNTER
----- Message from Darrell Moran sent at 12/16/2019  9:55 AM EST -----  Regarding: Dr. Jovanny Nuñez (if not patient):      Relationship of caller (if not patient):      Best contact number(s):[phone]      Name of medication and dosage if known:\"Gabapentin\" 8 mg 3x daily and \"Amitriptyline HCL\" 10 mg at bedtime. Is patient out of this medication (yes/no):no      Pharmacy name:[facility] listed in chart? (yes/no):yes  Pharmacy phone number:[phone]      Details to clarify the request: Pt requested a refill on her Rxs. Pt stated she  would like the vaccinations Rx for shingles and pneumonia called in.       Darrell Moran

## 2019-12-18 RX ORDER — GABAPENTIN 400 MG/1
800 CAPSULE ORAL 3 TIMES DAILY
Qty: 540 CAP | Refills: 1 | Status: SHIPPED | OUTPATIENT
Start: 2019-12-18 | End: 2020-03-03

## 2019-12-18 RX ORDER — AMITRIPTYLINE HYDROCHLORIDE 10 MG/1
TABLET, FILM COATED ORAL
Qty: 30 TAB | Refills: 2 | Status: SHIPPED | OUTPATIENT
Start: 2019-12-18 | End: 2021-10-13

## 2020-03-03 ENCOUNTER — OFFICE VISIT (OUTPATIENT)
Dept: FAMILY MEDICINE CLINIC | Age: 70
End: 2020-03-03

## 2020-03-03 VITALS
SYSTOLIC BLOOD PRESSURE: 135 MMHG | HEIGHT: 64 IN | RESPIRATION RATE: 18 BRPM | TEMPERATURE: 98 F | HEART RATE: 61 BPM | DIASTOLIC BLOOD PRESSURE: 89 MMHG | WEIGHT: 146 LBS | BODY MASS INDEX: 24.92 KG/M2

## 2020-03-03 DIAGNOSIS — F41.9 ANXIETY: ICD-10-CM

## 2020-03-03 DIAGNOSIS — I10 ESSENTIAL HYPERTENSION: Primary | ICD-10-CM

## 2020-03-03 DIAGNOSIS — L29.9 ITCHING: ICD-10-CM

## 2020-03-03 DIAGNOSIS — Z87.891 HISTORY OF TOBACCO ABUSE: ICD-10-CM

## 2020-03-03 DIAGNOSIS — L85.3 XEROSIS OF SKIN: ICD-10-CM

## 2020-03-03 RX ORDER — GABAPENTIN 800 MG/1
TABLET ORAL
COMMUNITY
Start: 2019-12-18 | End: 2020-06-15 | Stop reason: SDUPTHER

## 2020-03-03 RX ORDER — QUETIAPINE FUMARATE 25 MG/1
TABLET, FILM COATED ORAL
COMMUNITY
Start: 2020-01-15 | End: 2021-06-01

## 2020-03-03 NOTE — PROGRESS NOTES
HISTORY OF PRESENT ILLNESS  Alethea Padron is a 81 y.o. female. Patient presents with:  Elevated Blood Pressure: pt states whenever she \"goes to dr her bp is elevated\"  Skin Problem: generalized; \"not getting better\"    Alethea Padron is here to follow up on their HTN. Her mental health coordinator is with her today. This is a chronic problem. The problem occurs constantly and is stable. The symptoms are relieved by Toprol XL, Norvasc, which is/are working well. However, her blood pressure has been up every time she goes to the doctor over the past 2 weeks. She has had generalized itching for about the past 14 months. It is unchanged. She is using [employer], bathing 3 days a week, and uses bath oil after bathing. Hydroxyzine helps. Her anxiety makes it worse. She has xerosis. Also, she has difficulty affording good skin lotion. She has stopped smoking! Review of Systems   Constitutional: Negative for weight loss. No weight gain   Eyes: Negative for blurred vision. Respiratory: Negative for shortness of breath. Cardiovascular: Negative for chest pain and leg swelling. Gastrointestinal: Negative for abdominal pain. Skin: Positive for itching. Negative for rash. Neurological: Negative for dizziness, sensory change, speech change, focal weakness and headaches. Visit Vitals  /89   Pulse 61   Temp 98 °F (36.7 °C) (Oral)   Resp 18   Ht 5' 4\" (1.626 m)   Wt 146 lb (66.2 kg)   BMI 25.06 kg/m²     BP Readings from Last 3 Encounters:   12/05/19 124/84   11/07/19 140/87   07/31/19 120/83     Physical Exam  Vitals signs and nursing note reviewed. Constitutional:       General: She is not in acute distress. Appearance: She is well-developed. She is not diaphoretic. Cardiovascular:      Rate and Rhythm: Normal rate and regular rhythm. Heart sounds: Normal heart sounds. No murmur. No friction rub. No gallop.     Pulmonary:      Effort: Pulmonary effort is normal. No respiratory distress. Breath sounds: Normal breath sounds. No wheezing or rales. Skin:     General: Skin is warm and dry. Findings: No erythema or rash. Comments: Dry   Neurological:      Mental Status: She is alert and oriented to person, place, and time. ASSESSMENT and PLAN    ICD-10-CM ICD-9-CM    1. Essential hypertension I10 401.9 AMB SUPPLY ORDER   2. Itching L29.9 698.9    3. Xerosis of skin L85.3 706.8    4. Anxiety F41.9 300.00    5. History of tobacco abuse Z87.891 V15.82         SBP borderline today  Itching due to xerosis and her anxiety flare ups  Has stopped smoking  Check home blood pressures and write them down  Apply a thin coat of whipped Crisco to skin after bathing  Continue with her mental health care  Congratulated about her smoking cessation    Follow-up and Dispositions    · Return in about 6 weeks (around 4/14/2020) for blood pressure, bring home blood pressures. Reviewed plan of care. Patient has provided input and agrees with goals.

## 2020-03-03 NOTE — PROGRESS NOTES
Chief Complaint   Patient presents with    Elevated Blood Pressure     pt states whenever she \"goes to dr her bp is elevated\"    Skin Problem     generalized; \"not getting better\"     1. Have you been to the ER, urgent care clinic since your last visit? Hospitalized since your last visit? No     2. Have you seen or consulted any other health care providers outside of the [address] [place] [address] since your last visit? Include any pap smears or colon screening.  No

## 2020-03-05 ENCOUNTER — TELEPHONE (OUTPATIENT)
Dept: FAMILY MEDICINE CLINIC | Age: 70
End: 2020-03-05

## 2020-03-05 NOTE — TELEPHONE ENCOUNTER
Request for office notes on pt to process her order for blood pressure monitor sent by  [facility] 3/3/20.   Porfirio

## 2020-03-05 NOTE — TELEPHONE ENCOUNTER
Called and spoke with Darren Lee and she has been advised and states understanding that pt will need to sign a signed release prior to being able to release pt's progress note. Darren Lee will call pt, and have her come in to sign release form.

## 2020-04-10 ENCOUNTER — VIRTUAL VISIT (OUTPATIENT)
Dept: FAMILY MEDICINE CLINIC | Age: 70
End: 2020-04-10

## 2020-04-10 VITALS — HEIGHT: 64 IN | WEIGHT: 145 LBS | BODY MASS INDEX: 24.75 KG/M2 | RESPIRATION RATE: 16 BRPM

## 2020-04-10 DIAGNOSIS — M79.7 FIBROMYALGIA: Primary | ICD-10-CM

## 2020-04-10 DIAGNOSIS — F39 MOOD DISORDER (HCC): ICD-10-CM

## 2020-04-10 RX ORDER — METAXALONE 400 MG/1
400 TABLET ORAL
Qty: 90 TAB | Refills: 3 | Status: SHIPPED | OUTPATIENT
Start: 2020-04-10 | End: 2020-05-18

## 2020-04-10 NOTE — PROGRESS NOTES
Katheryn Calvillo is a 81 y.o. female who was seen by synchronous (real-time) audio-video technology on 4/10/2020. Assessment & Plan:   Diagnoses and all orders for this visit:    1. Fibromyalgia  -     metaxalone (SKELAXIN) 400 mg tablet; Take 1 Tab by mouth three (3) times daily as needed for Pain. 2. Mood disorder ([address]        Has failed tizanidine, Skelaxin has worked well for her fibromyalgia and mood disorder in the past  Skelaxin ordered to trigger a prior auth    Follow-up and Dispositions    · Return in about 2 months (around 6/10/2020) for blood pressure. Reviewed plan of care. Patient has provided input and agrees with goals. CPT Codes 24487-12480 for Established Patients may apply to this Telehealth Visit      Subjective:   Katheryn Calvillo was seen for Fibromyalgia      Patient presents with:  Fibromyalgia    This is chronic problem that is unchanged. She has been taking tizanidine which is not working. Evidently, she had been on   Skelaxin, which was working well, but it is not covered by her insurance company. Not moving makes her worse, so she tries to move a round a lot. She has a mood disorder and it also helps stabilize her mood. Prior to Admission medications    Medication Sig Start Date End Date Taking? Authorizing Provider   QUEtiapine (SEROQUEL) 25 mg tablet  1/15/20  Yes Provider, Historical   gabapentin (NEURONTIN) 800 mg tablet  12/18/19  Yes Provider, Historical   amitriptyline (ELAVIL) 10 mg tablet TAKE 1 TABLET BY MOUTH DAILY AT BEDTIME 12/18/19  Yes Charly Lester MD   hydrOXYzine pamoate (VISTARIL) 50 mg capsule Take 1 Cap by mouth two (2) times daily as needed for Itching or Anxiety. 12/9/19  Yes Charly Lester MD   tiZANidine (ZANAFLEX) 2 mg tablet Take 1 Tab by mouth three (3) times daily as needed for Pain.  12/5/19  Yes Charly Lester MD   metoprolol succinate (TOPROL-XL) 25 mg XL tablet TAKE THREE TABLET BY MOUTH TWICE A DAY 11/26/19 Yes Gege Lewis MD   multivitamin (ONE A DAY) tablet Take 1 Tab by mouth daily. Yes Provider, Historical   Vitamin B Complex No.12-Niacin 50 mg/15 mL liqd Take  by mouth. Yes Provider, Historical   senna leaf tea Take  by mouth. Yes Provider, Historical   amLODIPine (NORVASC) 10 mg tablet Take 1 Tab by mouth daily. 4/24/19   Ggee Lewis MD     No Known Allergies      Review of Systems   Constitutional: Negative for chills and fever. Musculoskeletal: Positive for myalgias. Negative for joint pain. No joint swelling   Skin:        No joint redness or warmth         Objective:     Visit Vitals  Resp 16   Ht 5' 4\" (1.626 m)   Wt 145 lb (65.8 kg)   BMI 24.89 kg/m²     Physical Exam  Constitutional:       General: She is not in acute distress. Appearance: Normal appearance. Neurological:      Mental Status: She is alert and oriented to person, place, and time. Motor: No tremor. Psychiatric:         Mood and Affect: Mood normal.         Behavior: Behavior normal.         Thought Content: Thought content normal.         Judgment: Judgment normal.         Due to this being a TeleHealth evaluation, many elements of the physical examination are unable to be assessed. We discussed the expected course, resolution and complications of the diagnosis(es) in detail. Medication risks, benefits, costs, interactions, and alternatives were discussed as indicated. I advised her to contact the office if her condition worsens, changes or fails to improve as anticipated. She expressed understanding with the diagnosis(es) and plan.          Pursuant to the emergency declaration under the [facility], [facility] waiver authority and the [employer] Act, this Virtual  Visit was conducted, with patient's consent, to reduce the patient's risk of exposure to COVID-19 and provide continuity of care for an established patient. Services were provided through a video synchronous discussion virtually to substitute for in-person clinic visit.     Jose Casey MD

## 2020-04-20 ENCOUNTER — TELEPHONE (OUTPATIENT)
Dept: FAMILY MEDICINE CLINIC | Age: 70
End: 2020-04-20

## 2020-04-20 NOTE — TELEPHONE ENCOUNTER
Pt called requesting call back due to extreme weakness, fatigue for 4 days, denies fever, cough, or shortness of breath, but is isn't eating or drinking much and just doesn't feel like herself. Pt concerned because of low immune system and does not want to go to ER or urgent care due to concerns about the corona virus. Pt given number for [facility], agrees to call them now and also advised message will be sent to Dr. Tanvir Bojorquez and her nurse. Pt agrees to plan. Please call pt at [phone].  Ldm

## 2020-04-27 ENCOUNTER — VIRTUAL VISIT (OUTPATIENT)
Dept: FAMILY MEDICINE CLINIC | Age: 70
End: 2020-04-27

## 2020-04-27 VITALS — WEIGHT: 145 LBS | BODY MASS INDEX: 24.75 KG/M2 | HEIGHT: 64 IN

## 2020-04-27 DIAGNOSIS — R30.0 DYSURIA: Primary | ICD-10-CM

## 2020-04-27 RX ORDER — CEPHALEXIN 500 MG/1
500 CAPSULE ORAL 2 TIMES DAILY
Qty: 6 CAP | Refills: 0 | Status: SHIPPED | OUTPATIENT
Start: 2020-04-27 | End: 2020-04-30

## 2020-04-27 NOTE — PROGRESS NOTES
Naveen Mcarthur is a 81 y.o. female evaluated via telephone on 4/27/2020. Consent:  She and/or health care decision maker is aware that she may receive a bill for this telephone service, depending on her insurance coverage, and has provided verbal consent to proceed: Yes      Documentation:  I communicated with the patient and/or health care decision maker about her dysuria. Details of this discussion including any medical advice provided:       Subjective:   Naveen Mcarthur was seen for Urinary Retention (x 72 hours ) and Urinary Pain      Patient presents with:  Urinary Retention: x 72 hours   Urinary Pain    She is unchanged. Nothing in particular is making her better or worse. She is pushing fluids. Prior to Admission medications    Medication Sig Start Date End Date Taking? Authorizing Provider   metaxalone (SKELAXIN) 400 mg tablet Take 1 Tab by mouth three (3) times daily as needed for Pain. 4/10/20  Yes Siddharth Piedra MD   QUEtiapine (SEROQUEL) 25 mg tablet  1/15/20  Yes Provider, Historical   gabapentin (NEURONTIN) 800 mg tablet  12/18/19  Yes Provider, Historical   amitriptyline (ELAVIL) 10 mg tablet TAKE 1 TABLET BY MOUTH DAILY AT BEDTIME 12/18/19  Yes Siddharth Piedra MD   hydrOXYzine pamoate (VISTARIL) 50 mg capsule Take 1 Cap by mouth two (2) times daily as needed for Itching or Anxiety. 12/9/19  Yes Siddharth Piedra MD   metoprolol succinate (TOPROL-XL) 25 mg XL tablet TAKE THREE TABLET BY MOUTH TWICE A DAY 11/26/19  Yes Siddharth Piedra MD   amLODIPine (NORVASC) 10 mg tablet Take 1 Tab by mouth daily. 4/24/19  Yes Siddharth Piedra MD   multivitamin (ONE A DAY) tablet Take 1 Tab by mouth daily. Yes Provider, Historical   Vitamin B Complex No.12-Niacin 50 mg/15 mL liqd Take  by mouth. Yes Provider, Historical   senna leaf tea Take  by mouth. Yes Provider, Historical     No Known Allergies      Review of Systems   Constitutional: Negative for chills, fever and malaise/fatigue. Gastrointestinal: Negative for abdominal pain. Genitourinary: Positive for dysuria and urgency. Negative for flank pain, frequency and hematuria. Objective:     Visit Vitals  Ht 5' 4\" (1.626 m)   Wt 145 lb (65.8 kg)   BMI 24.89 kg/m²         Assessment & Plan:   Diagnoses and all orders for this visit:    1. Dysuria  -     cephALEXin (Keflex) 500 mg capsule; Take 1 Cap by mouth two (2) times a day for 3 days. Likely UTI  Keflex  Continue to push fluids    Follow-up and Dispositions    · Return if not better in 3 days. Reviewed plan of care. Patient has provided input and agrees with goals. I affirm this is a Patient Initiated Episode with an Established Patient who has not had a related appointment within my department in the past 7 days or scheduled within the next 24 hours.     Total Time: minutes: 5-10 minutes    Note: not billable if this call serves to triage the patient into an appointment for the relevant concern      Douglas Prakash MD

## 2020-04-27 NOTE — PROGRESS NOTES
Chief Complaint   Patient presents with    Urinary Retention     x 72 hours     Urinary Pain     Pt is completing appointment at home in [place], [place].

## 2020-05-05 ENCOUNTER — TELEPHONE (OUTPATIENT)
Dept: FAMILY MEDICINE CLINIC | Age: 70
End: 2020-05-05

## 2020-05-05 NOTE — TELEPHONE ENCOUNTER
Pt called requesting a new referral for breast reduction to Dr Sharonda Horton. States she had a referral from 2016 but has now gone back to see him and wants to have it done. States Dr Mariah Turner is aware of the issues she is having.  (Fibromylagia, pain in her back and shoulder)    Please fax to Dr Sabi Milan at [phone]

## 2020-05-05 NOTE — TELEPHONE ENCOUNTER
Please call patient and let her know that she does not need a referral because this is not covered by insurance. It is considered cosmetic.

## 2020-05-05 NOTE — TELEPHONE ENCOUNTER
Called and spoke with pt, and she has been scheduled for 05/06/20 to discuss referral and information needed.

## 2020-05-06 ENCOUNTER — VIRTUAL VISIT (OUTPATIENT)
Dept: FAMILY MEDICINE CLINIC | Age: 70
End: 2020-05-06

## 2020-05-06 VITALS — WEIGHT: 145 LBS | HEIGHT: 64 IN | BODY MASS INDEX: 24.75 KG/M2

## 2020-05-06 DIAGNOSIS — M54.9 CHRONIC BACK PAIN, UNSPECIFIED BACK LOCATION, UNSPECIFIED BACK PAIN LATERALITY: ICD-10-CM

## 2020-05-06 DIAGNOSIS — N64.81 BREAST PTOSIS: ICD-10-CM

## 2020-05-06 DIAGNOSIS — M25.519 CHRONIC SHOULDER PAIN, UNSPECIFIED LATERALITY: ICD-10-CM

## 2020-05-06 DIAGNOSIS — G89.29 CHRONIC BACK PAIN, UNSPECIFIED BACK LOCATION, UNSPECIFIED BACK PAIN LATERALITY: ICD-10-CM

## 2020-05-06 DIAGNOSIS — G89.29 CHRONIC SHOULDER PAIN, UNSPECIFIED LATERALITY: ICD-10-CM

## 2020-05-06 DIAGNOSIS — N62 MACROMASTIA: Primary | ICD-10-CM

## 2020-05-06 DIAGNOSIS — B37.9 CANDIDIASIS: ICD-10-CM

## 2020-05-06 NOTE — PROGRESS NOTES
Pt is having virtual appointment at home in [place], [place].      Chief Complaint   Patient presents with    Referral Follow Up     Breast Reduction

## 2020-05-06 NOTE — PROGRESS NOTES
Bobby Garvey is a 81 y.o. female evaluated via telephone on 5/6/2020. Consent:  She and/or health care decision maker is aware that she may receive a bill for this telephone service, depending on her insurance coverage, and has provided verbal consent to proceed: Yes      Documentation:  I communicated with the patient and/or health care decision maker about her breast problems. Details of this discussion including any medical advice provided:       Subjective:   Bobby Garvey was seen for Referral Follow Up (Breast Reduction )      Patient presents with:  Referral Follow Up: Breast Reduction     She has been trying to get a breast reduction for several years. Evidently she has seen 3 doctors and found one at [facility] that takes her insurance. Since she had her gastric bypass, her breasts droop to her waist, causing chest and shoulder pain. The pain is severe. Also, they get in the way in her walker. She has tried bras without success and wears a back  brace all the time. Also, she has recurrent. Candidiasis beneath her breasts. She needs a referral to [facility]. Review of Systems   Cardiovascular: Positive for chest pain. Chest wall pain   Musculoskeletal: Positive for back pain. Shoulder pain         Objective:     Visit Vitals  Ht 5' 4\" (1.626 m)   Wt 145 lb (65.8 kg)   BMI 24.89 kg/m²           Assessment & Plan:   Diagnoses and all orders for this visit:    1. Macromastia  -     REFERRAL TO PLASTIC SURGERY    2. Breast ptosis  -     REFERRAL TO PLASTIC SURGERY    3. Chronic shoulder pain, unspecified laterality  -     REFERRAL TO PLASTIC SURGERY    4. Chronic back pain, unspecified back location, unspecified back pain laterality  -     REFERRAL TO PLASTIC SURGERY    5. Candidiasis  -     REFERRAL TO PLASTIC SURGERY        Referred to plastic surgery    Follow-up and Dispositions    · Return if symptoms worsen or fail to improve. Reviewed plan of care.   Patient has provided input and agrees with goals. I affirm this is a Patient Initiated Episode with an Established Patient who has not had a related appointment within my department in the past 7 days or scheduled within the next 24 hours.     Total Time: minutes: 5-10 minutes    Note: not billable if this call serves to triage the patient into an appointment for the relevant concern      Douglas Prakash MD

## 2020-05-14 DIAGNOSIS — I10 ESSENTIAL HYPERTENSION: ICD-10-CM

## 2020-05-17 RX ORDER — METOPROLOL SUCCINATE 25 MG/1
TABLET, EXTENDED RELEASE ORAL
Qty: 180 TAB | Refills: 0 | Status: SHIPPED | OUTPATIENT
Start: 2020-05-17 | End: 2020-06-15

## 2020-05-18 RX ORDER — TIZANIDINE 2 MG/1
TABLET ORAL
Qty: 90 TAB | Refills: 3 | Status: SHIPPED | OUTPATIENT
Start: 2020-05-18 | End: 2020-08-20

## 2020-05-29 ENCOUNTER — VIRTUAL VISIT (OUTPATIENT)
Dept: FAMILY MEDICINE CLINIC | Age: 70
End: 2020-05-29

## 2020-05-29 VITALS
HEIGHT: 64 IN | WEIGHT: 145 LBS | SYSTOLIC BLOOD PRESSURE: 148 MMHG | DIASTOLIC BLOOD PRESSURE: 88 MMHG | BODY MASS INDEX: 24.75 KG/M2

## 2020-05-29 DIAGNOSIS — R10.9 ABDOMINAL PAIN, UNSPECIFIED ABDOMINAL LOCATION: ICD-10-CM

## 2020-05-29 DIAGNOSIS — I10 ESSENTIAL HYPERTENSION: Primary | ICD-10-CM

## 2020-05-29 DIAGNOSIS — R52 PAIN: ICD-10-CM

## 2020-05-29 DIAGNOSIS — Z87.11 HISTORY OF PEPTIC ULCER DISEASE: ICD-10-CM

## 2020-05-29 NOTE — PROGRESS NOTES
Naveen Mcarthur is a 81 y.o. female evaluated via telephone on 5/29/2020. Consent:  She and/or health care decision maker is aware that she may receive a bill for this telephone service, depending on her insurance coverage, and has provided verbal consent to proceed: Yes      Documentation:  I communicated with the patient and/or health care decision maker about her elevated BP, chronic pain and abdominal pain. Details of this discussion including any medical advice provided:       Subjective:   Naveen Mcarthur was seen for Hypertension (pt states her blood pressure have been elevated for the past 3 days - pt is takign RX's as directed-per pt)      Patient presents with:  Hypertension: pt states her blood pressure have been elevated for the past 3 days - pt is takign RX's as directed-per pt      Naveen Mcarthur is here to follow up on their HTN. This is a chronic problem. The problem occurs constantly and is worse. The symptoms are relieved by Toprol XL, which is/are working moderately. Her blood pressures have been 141/96, 164/103, 148/88 today. She is in a lot of pain. Review of Systems   Constitutional: Negative for weight loss. No weight gain   Eyes: Negative for blurred vision. Respiratory: Negative for shortness of breath. Cardiovascular: Negative for chest pain and leg swelling. Gastrointestinal: Positive for abdominal pain. Neurological: Negative for dizziness, sensory change, speech change, focal weakness and headaches. Objective:     Visit Vitals  /88   Ht 5' 4\" (1.626 m)   Wt 145 lb (65.8 kg)   BMI 24.89 kg/m²     BP Readings from Last 3 Encounters:   05/29/20 148/88   03/03/20 135/89   12/05/19 124/84           Assessment & Plan:   Diagnoses and all orders for this visit:    1. Essential hypertension    2. Pain    3.  Abdominal pain, unspecified abdominal location  -     REFERRAL TO GASTROENTEROLOGY    4. History of peptic ulcer disease  -     REFERRAL TO GASTROENTEROLOGY        Blood pressure likely elevated due to an exacerbation of her chronic pain  Record home blood pressures    Follow-up and Dispositions    · Return in about 1 month (around 6/29/2020) for blood pressure, bring blood pressures. At the end of her visit she mentions she has abdominal pain and never had a follow up EGD after her PUD several years ago. Referred to Gastroenterology. Reviewed plan of care. Patient has provided input and agrees with goals. I affirm this is a Patient Initiated Episode with an Established Patient who has not had a related appointment within my department in the past 7 days or scheduled within the next 24 hours.     Total Time: minutes: 11-20 minutes    Note: not billable if this call serves to triage the patient into an appointment for the relevant concern      Allie Cali MD

## 2020-05-29 NOTE — PROGRESS NOTES
Chief Complaint   Patient presents with    Hypertension     pt states her blood pressure have been elevated for the past 3 days - pt is takign RX's as directed-per pt     Pt is having virtual appointment at home in [place], [place].

## 2020-06-15 ENCOUNTER — VIRTUAL VISIT (OUTPATIENT)
Dept: FAMILY MEDICINE CLINIC | Age: 70
End: 2020-06-15

## 2020-06-15 DIAGNOSIS — I10 ESSENTIAL HYPERTENSION: Primary | ICD-10-CM

## 2020-06-15 DIAGNOSIS — G62.9 NEUROPATHY: ICD-10-CM

## 2020-06-15 DIAGNOSIS — I10 ESSENTIAL HYPERTENSION: ICD-10-CM

## 2020-06-15 RX ORDER — METOPROLOL SUCCINATE 25 MG/1
TABLET, EXTENDED RELEASE ORAL
Qty: 180 TAB | Refills: 0 | Status: CANCELLED | OUTPATIENT
Start: 2020-06-15

## 2020-06-15 RX ORDER — GABAPENTIN 800 MG/1
400 TABLET ORAL
Status: CANCELLED | OUTPATIENT
Start: 2020-06-15

## 2020-06-15 RX ORDER — GABAPENTIN 800 MG/1
800 TABLET ORAL 3 TIMES DAILY
Qty: 270 TAB | Refills: 1 | Status: SHIPPED | OUTPATIENT
Start: 2020-06-15 | End: 2021-05-13

## 2020-06-15 RX ORDER — METOPROLOL SUCCINATE 100 MG/1
100 TABLET, EXTENDED RELEASE ORAL 2 TIMES DAILY
Qty: 180 TAB | Refills: 0 | Status: SHIPPED | OUTPATIENT
Start: 2020-06-15 | End: 2020-07-07 | Stop reason: SDUPTHER

## 2020-06-15 RX ORDER — AMLODIPINE BESYLATE 10 MG/1
10 TABLET ORAL DAILY
COMMUNITY
End: 2020-06-15

## 2020-06-15 NOTE — PROGRESS NOTES
Katheryn Calvillo is a 81 y.o. female evaluated via telephone on 6/15/2020. Consent:  She and/or health care decision maker is aware that she may receive a bill for this telephone service, depending on her insurance coverage, and has provided verbal consent to proceed: Yes      Documentation:  I communicated with the patient and/or health care decision maker about her BP and gabapentin refill. Details of this discussion including any medical advice provided:       Subjective:   Katheryn Calvillo was seen for Hypertension (Follow up) and Medication Refill      Katheryn Calvillo is here to follow up on their HTN. This is a chronic problem. The problem occurs constantly and is unchanged. The symptoms are relieved by Toprol XL, which is/are working mildly. Her home blood pressures have been around 150/113. She tells me she stopped her Norvasc and wants to know if she should go back on it or not. Review of Systems   Constitutional: Negative for weight loss. No weight gain   Eyes: Negative for blurred vision. Respiratory: Negative for shortness of breath. Cardiovascular: Negative for chest pain and leg swelling. Gastrointestinal: Negative for abdominal pain. Neurological: Negative for dizziness, sensory change, speech change, focal weakness and headaches. Objective:   /98, P 93      Assessment & Plan:   Diagnoses and all orders for this visit:    1. Essential hypertension  -     metoprolol succinate (TOPROL-XL) 100 mg tablet; Take 1 Tab by mouth two (2) times a day. TAKE THREE TABLET BY MOUTH TWICE A DAY    2. Neuropathy  -     gabapentin (NEURONTIN) 800 mg tablet; Take 1 Tab by mouth three (3) times daily. Max Daily Amount: 2,400 mg.         Blood pressure unchanged, not taking Norvasc  Increase Toprol XL  She will call me with her BP's in one week to see if the Norvasc needs to be added back    Follow-up and Dispositions    · Return in about 6 weeks (around 7/27/2020) for blood pressure. Reviewed plan of care. Patient has provided input and agrees with goals. I affirm this is a Patient Initiated Episode with an Established Patient who has not had a related appointment within my department in the past 7 days or scheduled within the next 24 hours.     Total Time: minutes: 11-20 minutes    Note: not billable if this call serves to triage the patient into an appointment for the relevant concern      Vicky Corcoran MD

## 2020-06-15 NOTE — TELEPHONE ENCOUNTER
Pt called requesting refill on metoprolol, wants to restart Norvasc, and needs refill on Gabapentin sent to [facility], stating she'll be out of medication by Wednesday and had stopped taking norvasc prior to her last visit. Pt scheduled virtual visit for blood pressure today at 10:45 am to discuss.  Porfirio

## 2020-06-15 NOTE — PROGRESS NOTES
Chief Complaint   Patient presents with    Hypertension     Follow up    Medication Refill     1. Have you been to the ER, urgent care clinic since your last visit? Hospitalized since your last visit? No    2. Have you seen or consulted any other health care providers outside of the [address] [place] [address] since your last visit? Include any pap smears or colon screening.  No    Patient is completing visit [place], [place].

## 2020-06-16 RX ORDER — AMLODIPINE BESYLATE 5 MG/1
5 TABLET ORAL DAILY
Qty: 30 TAB | Refills: 1 | Status: SHIPPED | OUTPATIENT
Start: 2020-06-16 | End: 2020-07-07

## 2020-07-07 ENCOUNTER — VIRTUAL VISIT (OUTPATIENT)
Dept: FAMILY MEDICINE CLINIC | Age: 70
End: 2020-07-07

## 2020-07-07 DIAGNOSIS — F41.9 ANXIETY: ICD-10-CM

## 2020-07-07 DIAGNOSIS — B37.2 CANDIDIASIS OF SKIN: ICD-10-CM

## 2020-07-07 DIAGNOSIS — I10 ESSENTIAL HYPERTENSION: Primary | ICD-10-CM

## 2020-07-07 RX ORDER — AMLODIPINE BESYLATE 10 MG/1
10 TABLET ORAL DAILY
Qty: 30 TAB | Refills: 1 | Status: SHIPPED | OUTPATIENT
Start: 2020-07-07 | End: 2020-08-20

## 2020-07-07 RX ORDER — DOXYLAMINE SUCCINATE 25 MG
TABLET ORAL 2 TIMES DAILY
Qty: 15 G | Refills: 0 | Status: SHIPPED | OUTPATIENT
Start: 2020-07-07 | End: 2020-08-20

## 2020-07-07 RX ORDER — METOPROLOL SUCCINATE 100 MG/1
100 TABLET, EXTENDED RELEASE ORAL 2 TIMES DAILY
Qty: 180 TAB | Refills: 0
Start: 2020-07-07 | End: 2020-10-22 | Stop reason: SDUPTHER

## 2020-07-07 RX ORDER — HYDROXYZINE PAMOATE 50 MG/1
50 CAPSULE ORAL
Qty: 180 CAP | Refills: 3 | Status: SHIPPED | OUTPATIENT
Start: 2020-07-07 | End: 2020-10-22

## 2020-07-07 RX ORDER — HYDROCHLOROTHIAZIDE 12.5 MG/1
12.5 TABLET ORAL DAILY
Qty: 30 TAB | Refills: 1 | Status: SHIPPED | OUTPATIENT
Start: 2020-07-07 | End: 2020-08-20

## 2020-07-07 NOTE — PROGRESS NOTES
Chief Complaint   Patient presents with    Skin Problem     itching under breast- Hydroxyzine HCl refill requested - will pay out of pocket     Hypertension     follow up      Pt is having virtual appointment at home in [facility], [facility].

## 2020-07-07 NOTE — PROGRESS NOTES
Jorge Hinson is a 81 y.o. female who was seen by synchronous (real-time) audio-video technology on 7/7/2020. Assessment & Plan:  
Diagnoses and all orders for this visit: 
 
1. Anxiety 
-     hydrOXYzine pamoate (VISTARIL) 50 mg capsule; Take 1 Cap by mouth two (2) times daily as needed for Itching or Anxiety. *** 
 
{Assessment and Plan:37726} Reviewed plan of care. Patient has provided input and agrees with goals. CPT Codes 41979-56787 for Established Patients may apply to this Telehealth Visit {Time-based coding, delete if not needed:62078::\"I spent at least 15 minutes with this established patient, and >50% of the time was spent counseling and/or coordinating care regarding ***\"} Subjective:  
Jorge Hinson was seen for Skin Problem (itching under breast- Hydroxyzine HCl refill requested - will pay out of pocket ) and Hypertension (follow up ) Jorge Hinson is here to follow up on their HTN. This is a chronic problem. The problem occurs constantly and is ***. The symptoms are relieved by ***, which is/are working ***. 
 
 
 
ROS Objective:  
 
Physical Exam 
 
Due to this being a TeleHealth evaluation, many elements of the physical examination are unable to be assessed. We discussed the expected course, resolution and complications of the diagnosis(es) in detail. Medication risks, benefits, costs, interactions, and alternatives were discussed as indicated. I advised her to contact the office if her condition worsens, changes or fails to improve as anticipated. She expressed understanding with the diagnosis(es) and plan.   
 
 
 
Pursuant to the emergency declaration under the [facility], [address] [place] [address] authority and the [employer] Act, this Virtual  Visit was conducted, with patient's consent, to reduce the patient's risk of exposure to COVID-19 and provide continuity of care for an established patient. Services were provided through a video synchronous discussion virtually to substitute for in-person clinic visit.  
 
Gracie Gonzalez MD

## 2020-07-07 NOTE — PROGRESS NOTES
Ruth Lundborg is a 81 y.o. female evaluated via telephone on 7/7/2020. Consent:  She and/or health care decision maker is aware that she may receive a bill for this telephone service, depending on her insurance coverage, and has provided verbal consent to proceed: Yes      Documentation:  I communicated with the patient and/or health care decision maker about her HTN and yeast infection. Details of this discussion including any medical advice provided:       Subjective:   Ruth Lundborg was seen for Skin Problem (itching under breast- Hydroxyzine HCl refill requested - will pay out of pocket ) and Hypertension (follow up )      Patient presents with:  Skin Problem: itching under breast- Hydroxyzine HCl refill requested - will pay out of pocket   Hypertension: follow up     Ruth Lundborg is here to follow up on their HTN. This is a chronic problem. The problem occurs constantly and is unchanged. The symptoms are relieved by Norvasc, metoprolol, which is/are working moderately. Her home blood pressures have been 151/93, 153/93, 152/89. Review of Systems   Constitutional: Negative for weight loss. No weight gain   Eyes: Negative for blurred vision. Respiratory: Negative for shortness of breath. Cardiovascular: Negative for chest pain and leg swelling. Gastrointestinal: Negative for abdominal pain. Skin: Positive for itching and rash. Neurological: Negative for dizziness, sensory change, speech change, focal weakness and headaches. Objective:   /93, P 75      Assessment & Plan:   Diagnoses and all orders for this visit:    1. Essential hypertension  -     metoprolol succinate (TOPROL-XL) 100 mg tablet; Take 1 Tab by mouth two (2) times a day. -     amLODIPine (NORVASC) 10 mg tablet; Take 1 Tab by mouth daily. -     hydroCHLOROthiazide (HYDRODIURIL) 12.5 mg tablet; Take 1 Tab by mouth daily.     2. Candidiasis of skin  -     miconazole (MICOTIN) 2 % topical cream; Apply  to affected area two (2) times a day. 3. Anxiety  -     hydrOXYzine pamoate (VISTARIL) 50 mg capsule; Take 1 Cap by mouth four (4) times daily as needed for Itching or Anxiety. Blood pressure needs improvement  Recurrent Candidiasis  Keep area beneath breasts dry  Micotin cream  Increase Norvasc  Add hydrochlorothiazide  Refills per orders    Follow-up and Dispositions    · Return in about 6 weeks (around 8/18/2020) for blood pressure. Reviewed plan of care. Patient has provided input and agrees with goals. I affirm this is a Patient Initiated Episode with an Established Patient who has not had a related appointment within my department in the past 7 days or scheduled within the next 24 hours.     Total Time: minutes: 11-20 minutes    Note: not billable if this call serves to triage the patient into an appointment for the relevant concern      Merita Nissen, MD

## 2020-07-14 ENCOUNTER — TELEPHONE (OUTPATIENT)
Dept: FAMILY MEDICINE CLINIC | Age: 70
End: 2020-07-14

## 2020-07-14 NOTE — TELEPHONE ENCOUNTER
Pt called stating her [employer] insurance denied breast reduction surgery because they did not have information from her PCP regarding why surgery is medically necessary and need Dr. Keri Cuevas notes faxed to Luis Torrez at [facility], fax number [phone]. Pt also states insurance denied mental health services she's been utilizing for PTSD, anxiety and needs notes showing Dr. Keri Cuevas treated her for PTSD and anxiety faxed to [address] [facility], Attn : Mr. Davila Richmond at [phone].  Ldm

## 2020-08-20 ENCOUNTER — TELEPHONE (OUTPATIENT)
Dept: FAMILY MEDICINE CLINIC | Age: 70
End: 2020-08-20

## 2020-08-20 ENCOUNTER — VIRTUAL VISIT (OUTPATIENT)
Dept: FAMILY MEDICINE CLINIC | Age: 70
End: 2020-08-20
Payer: MEDICARE

## 2020-08-20 DIAGNOSIS — K90.89 OTHER SPECIFIED INTESTINAL MALABSORPTION: ICD-10-CM

## 2020-08-20 DIAGNOSIS — I10 ESSENTIAL HYPERTENSION: ICD-10-CM

## 2020-08-20 DIAGNOSIS — I10 ESSENTIAL HYPERTENSION: Primary | ICD-10-CM

## 2020-08-20 DIAGNOSIS — E46 MALNUTRITION, UNSPECIFIED TYPE (HCC): Primary | ICD-10-CM

## 2020-08-20 PROBLEM — Z92.89 HISTORY OF PAPANICOLAOU SMEAR OF CERVIX: Status: RESOLVED | Noted: 2017-01-19 | Resolved: 2020-08-20

## 2020-08-20 PROBLEM — Z92.89 HISTORY OF MAMMOGRAM: Status: RESOLVED | Noted: 2017-01-19 | Resolved: 2020-08-20

## 2020-08-20 PROCEDURE — 99442 PR PHYS/QHP TELEPHONE EVALUATION 11-20 MIN: CPT | Performed by: FAMILY MEDICINE

## 2020-08-20 RX ORDER — METAXALONE 800 MG/1
800 TABLET ORAL 3 TIMES DAILY
COMMUNITY
End: 2020-10-22

## 2020-08-20 RX ORDER — HYDROCHLOROTHIAZIDE 25 MG/1
25 TABLET ORAL DAILY
Qty: 90 TAB | Refills: 0 | Status: SHIPPED | OUTPATIENT
Start: 2020-08-20 | End: 2020-10-22 | Stop reason: SDUPTHER

## 2020-08-20 RX ORDER — FERROUS SULFATE, DRIED 159(45)MG
1 TABLET, EXTENDED RELEASE ORAL DAILY
Qty: 90 TAB | Refills: 4 | Status: SHIPPED | OUTPATIENT
Start: 2020-08-20 | End: 2021-09-07

## 2020-08-20 NOTE — TELEPHONE ENCOUNTER
Pt called to advise Dr. Maribel Flannery she takes 45 mg of slow release iron once daily, states it's not OTC and needs it sent to [facility] for 90 day supply. Pt also requesting her lab orders from today's visit be faxed to her surgeon, Dr. Jalen Saxena' office at [phone] with request to have them drawn at her visit next Tuesday 8/25/20.   Lab orders faxed to office per request. Kristie Denver

## 2020-08-20 NOTE — PROGRESS NOTES
Isabel Brown is a 78 y.o. female who was seen by synchronous (real-time) audio-video technology on 8/20/2020. Assessment & Plan:  
{There are no diagnoses linked to this encounter. (Refresh or delete this SmartLink)} *** 
 
{Assessment and Plan:03452} Reviewed plan of care. Patient has provided input and agrees with goals. CPT Codes 54444-39785 for Established Patients may apply to this Telehealth Visit {Time-based coding, delete if not needed:62030::\"I spent at least 15 minutes with this established patient, and >50% of the time was spent counseling and/or coordinating care regarding ***\"} Subjective:  
Isabel Brown was seen for Hypertension (Follow up) and Medication Refill Isabel Brown is here to follow up on their HTN. This is a chronic problem. The problem occurs constantly and is ***. The symptoms are relieved by Toprol XL, hydrochlorothiazide, which is/are working ***. Review of Systems Constitutional: Negative for weight loss. No weight gain Eyes: Negative for blurred vision. Respiratory: Negative for shortness of breath. Cardiovascular: Negative for chest pain and leg swelling. Gastrointestinal: Negative for abdominal pain. Neurological: Negative for dizziness, sensory change, speech change, focal weakness and headaches. Objective:  
/93, P 85, R 16 
BP Readings from Last 3 Encounters:  
05/29/20 148/88  
03/03/20 135/89  
12/05/19 124/84 Physical Exam 
 
Due to this being a TeleHealth evaluation, many elements of the physical examination are unable to be assessed. We discussed the expected course, resolution and complications of the diagnosis(es) in detail. Medication risks, benefits, costs, interactions, and alternatives were discussed as indicated.   I advised her to contact the office if her condition worsens, changes or fails to improve as anticipated. She expressed understanding with the diagnosis(es) and plan. Pursuant to the emergency declaration under the [facility], [facility] waiver authority and the [employer] Act, this Virtual  Visit was conducted, with patient's consent, to reduce the patient's risk of exposure to COVID-19 and provide continuity of care for an established patient. Services were provided through a video synchronous discussion virtually to substitute for in-person clinic visit.  
 
Reji Arvizu MD

## 2020-08-20 NOTE — PROGRESS NOTES
Chief Complaint   Patient presents with    Hypertension     Follow up    Medication Refill   1. Have you been to the ER, urgent care clinic since your last visit? Hospitalized since your last visit? No    2. Have you seen or consulted any other health care providers outside of the [address] [place] [address] since your last visit? Include any pap smears or colon screening.  No

## 2020-08-20 NOTE — PROGRESS NOTES
Savita Estevez is a 78 y.o. female evaluated via telephone on 8/20/2020. Consent:  She and/or health care decision maker is aware that she may receive a bill for this telephone service, depending on her insurance coverage, and has provided verbal consent to proceed: Yes      Documentation:  I communicated with the patient and/or health care decision maker about her HTN. Details of this discussion including any medical advice provided:       Subjective:   Savita Estevez was seen for Hypertension (Follow up) and Medication Refill      Savita Estevez is here to follow up on their HTN. This is a chronic problem. The problem occurs constantly and is improved. The symptoms are relieved by Toprol XL, hydrochlorothiazide, which is/are working moderately          Review of Systems   Constitutional: Positive for weight loss. No weight gain   Eyes: Negative for blurred vision. Respiratory: Negative for shortness of breath. Cardiovascular: Negative for chest pain and leg swelling. Gastrointestinal: Negative for abdominal pain. Neurological: Negative for dizziness, sensory change, speech change, focal weakness and headaches. Objective:   /93, P 85    BP Readings from Last 3 Encounters:   05/29/20 148/88   03/03/20 135/89   12/05/19 124/84         Assessment & Plan:   Diagnoses and all orders for this visit:    1. Essential hypertension  -     METABOLIC PANEL, BASIC; Future  -     hydroCHLOROthiazide (HYDRODIURIL) 25 mg tablet; Take 1 Tab by mouth daily. Blood pressure improving but still high  Labs per orders. Increase hydrochlorothiazide      Follow-up and Dispositions    · Return in about 6 weeks (around 10/1/2020) for blood pressure. Reviewed plan of care. Patient has provided input and agrees with goals.       I affirm this is a Patient Initiated Episode with an Established Patient who has not had a related appointment within my department in the past 7 days or scheduled within the next 24 hours.     Total Time: minutes: 11-20 minutes    Note: not billable if this call serves to triage the patient into an appointment for the relevant concern      Nick Beltrán MD

## 2020-08-24 ENCOUNTER — TELEPHONE (OUTPATIENT)
Dept: FAMILY MEDICINE CLINIC | Age: 70
End: 2020-08-24

## 2020-08-24 NOTE — TELEPHONE ENCOUNTER
Pt called to advise Dr. Quin Thornton her blood pressures are elevated since discontinuing Norvasc    BP this morning 137/82 with Norvasc after not taking it for several days but 111/76 when taking it daily. Pt  notes bottom number was up to 91 when not taking Norvasc. Pt requests call back from nurse to advise if ok to stay on Norvasc to keep her blood pressure under control even it means staying on 3 blood pressure medicines.   Porfirio

## 2020-08-25 NOTE — TELEPHONE ENCOUNTER
Patient advised per Dr Radha Rainey it is okay to continue taking Norvasc. Patient verbalized understanding.

## 2020-10-15 NOTE — TELEPHONE ENCOUNTER
Pt called stating she needs prior authorization for hydroxyzine, needs 90 day day supply, and pharmacy is requesting change her Gabapentin from 400 mg taking 2 capsules 3 times daily to 800 mg, 1 capsule 3 times daily or it will require prior auth. Pt also states Dr. Richard Zurita needs to update her medication list to show she's no longer taking Depakote and her Seroquel dose is 15 mg, not 50 mg and her psychiatrist is aware of this. Pt also requesting 90 day refill on metoprolol and states all of her medications should be for 90 days because it costs less. Please call pt if needed at [phone].  Porfirio You can access the FollowMyHealth Patient Portal offered by [facility] by registering at the following website: [email or web address]. By joining [facility]’s FollowMyHealth portal, you will also be able to view your health information using other applications (apps) compatible with our system.

## 2020-10-19 NOTE — TELEPHONE ENCOUNTER
Pt called to check on status of refill request for amlodipine and ensure prescription is for 10 mg and is for 90 day supply.  Ldm

## 2020-10-20 RX ORDER — AMLODIPINE BESYLATE 10 MG/1
TABLET ORAL
Qty: 90 TAB | Refills: 0 | Status: SHIPPED | OUTPATIENT
Start: 2020-10-20 | End: 2020-10-22 | Stop reason: SDUPTHER

## 2020-10-22 ENCOUNTER — VIRTUAL VISIT (OUTPATIENT)
Dept: FAMILY MEDICINE CLINIC | Age: 70
End: 2020-10-22
Payer: MEDICARE

## 2020-10-22 DIAGNOSIS — I10 ESSENTIAL HYPERTENSION: ICD-10-CM

## 2020-10-22 PROCEDURE — G8427 DOCREV CUR MEDS BY ELIG CLIN: HCPCS | Performed by: FAMILY MEDICINE

## 2020-10-22 PROCEDURE — G8399 PT W/DXA RESULTS DOCUMENT: HCPCS | Performed by: FAMILY MEDICINE

## 2020-10-22 PROCEDURE — 1090F PRES/ABSN URINE INCON ASSESS: CPT | Performed by: FAMILY MEDICINE

## 2020-10-22 PROCEDURE — G8420 CALC BMI NORM PARAMETERS: HCPCS | Performed by: FAMILY MEDICINE

## 2020-10-22 PROCEDURE — G8756 NO BP MEASURE DOC: HCPCS | Performed by: FAMILY MEDICINE

## 2020-10-22 PROCEDURE — 1101F PT FALLS ASSESS-DOCD LE1/YR: CPT | Performed by: FAMILY MEDICINE

## 2020-10-22 PROCEDURE — 99213 OFFICE O/P EST LOW 20 MIN: CPT | Performed by: FAMILY MEDICINE

## 2020-10-22 PROCEDURE — G9899 SCRN MAM PERF RSLTS DOC: HCPCS | Performed by: FAMILY MEDICINE

## 2020-10-22 PROCEDURE — G8432 DEP SCR NOT DOC, RNG: HCPCS | Performed by: FAMILY MEDICINE

## 2020-10-22 PROCEDURE — G8536 NO DOC ELDER MAL SCRN: HCPCS | Performed by: FAMILY MEDICINE

## 2020-10-22 PROCEDURE — 3017F COLORECTAL CA SCREEN DOC REV: CPT | Performed by: FAMILY MEDICINE

## 2020-10-22 RX ORDER — HYDROCHLOROTHIAZIDE 25 MG/1
25 TABLET ORAL DAILY
Qty: 90 TAB | Refills: 4 | Status: SHIPPED
Start: 2020-10-22 | End: 2021-06-01

## 2020-10-22 RX ORDER — DIVALPROEX SODIUM 250 MG/1
250 TABLET, DELAYED RELEASE ORAL DAILY
COMMUNITY
End: 2021-10-01

## 2020-10-22 RX ORDER — METOPROLOL SUCCINATE 100 MG/1
100 TABLET, EXTENDED RELEASE ORAL DAILY
Qty: 90 TAB | Refills: 4 | Status: SHIPPED
Start: 2020-10-22 | End: 2021-06-01

## 2020-10-22 RX ORDER — AMLODIPINE BESYLATE 10 MG/1
TABLET ORAL
Qty: 90 TAB | Refills: 4 | Status: SHIPPED | OUTPATIENT
Start: 2020-10-22 | End: 2020-11-19

## 2020-10-22 NOTE — PROGRESS NOTES
Papo Casas is a 78 y.o. female who was seen by synchronous (real-time) audio-video technology on 10/22/2020. Assessment & Plan:   Diagnoses and all orders for this visit:    1. Essential hypertension  -     amLODIPine (NORVASC) 10 mg tablet; TAKE 1 TABLET BY MOUTH DAILY  -     hydroCHLOROthiazide (HYDRODIURIL) 25 mg tablet; Take 1 Tab by mouth daily. -     metoprolol succinate (TOPROL-XL) 100 mg tablet; Take 1 Tab by mouth daily.  -     METABOLIC PANEL, BASIC; Future        Blood pressure controlled  Labs per orders. Continue current plans. Refills per orders    Follow-up and Dispositions    · Return in about 6 months (around 4/22/2021) for blood pressure. Reviewed plan of care. Patient has provided input and agrees with goals. CPT Codes 70126-23744 for Established Patients may apply to this Telehealth Visit      Subjective:   Papo Casas was seen for Hypertension (Follow up) and Medication Refill      Papo Casas is here to follow up on their HTN. This is a chronic problem. The problem occurs constantly and is improved. The symptoms are relieved by Norvasc, hydrochlorothiazide, Toprol XL, which is/are working well. She requests a 90 day supply of her BP meds. Review of Systems   Constitutional: Positive for malaise/fatigue and weight loss. No weight gain   Eyes: Positive for blurred vision. Respiratory: Negative for shortness of breath. Cardiovascular: Positive for leg swelling. Negative for chest pain. Gastrointestinal: Negative for abdominal pain. Neurological: Negative for dizziness, sensory change, speech change, focal weakness and headaches. Objective:   /81, P 70  BP Readings from Last 3 Encounters:   05/29/20 148/88   03/03/20 135/89   12/05/19 124/84       Physical Exam  Constitutional:       General: She is not in acute distress. Appearance: Normal appearance.    Neurological:      Mental Status: She is alert and oriented to person, place, and time. Due to this being a TeleHealth evaluation, many elements of the physical examination are unable to be assessed. We discussed the expected course, resolution and complications of the diagnosis(es) in detail. Medication risks, benefits, costs, interactions, and alternatives were discussed as indicated. I advised her to contact the office if her condition worsens, changes or fails to improve as anticipated. She expressed understanding with the diagnosis(es) and plan. Pursuant to the emergency declaration under the [address] [place] [address], [facility] waiver authority and the [employer] Act, this Virtual  Visit was conducted, with patient's consent, to reduce the patient's risk of exposure to COVID-19 and provide continuity of care for an established patient. Services were provided through a video synchronous discussion virtually to substitute for in-person clinic visit.     Kristen Hardy MD

## 2020-10-22 NOTE — PROGRESS NOTES
Chief Complaint   Patient presents with    Hypertension     Follow up    Medication Refill   1. Have you been to the ER, urgent care clinic since your last visit? Hospitalized since your last visit? No    2. Have you seen or consulted any other health care providers outside of the [address] [place] [address] since your last visit? Include any pap smears or colon screening.  No

## 2020-10-23 NOTE — TELEPHONE ENCOUNTER
Pt called requesting new prescription for 14 mg Nicotine patches to be sent to her local pharmacy stating she's been getting sick from chewing nicotine gum, trying to quit smoking, and was advised by her insurance company medicaid will cover the patches. Please call pt if any questions.  Porfirio

## 2020-10-24 RX ORDER — IBUPROFEN 200 MG
1 TABLET ORAL EVERY 24 HOURS
Qty: 30 PATCH | Refills: 0 | Status: SHIPPED | OUTPATIENT
Start: 2020-10-24 | End: 2020-11-19

## 2020-11-18 DIAGNOSIS — I10 ESSENTIAL HYPERTENSION: ICD-10-CM

## 2020-11-19 LAB
BUN SERPL-MCNC: 12 MG/DL (ref 8–27)
BUN/CREAT SERPL: 17 (ref 12–28)
CALCIUM SERPL-MCNC: 9.3 MG/DL (ref 8.7–10.3)
CHLORIDE SERPL-SCNC: 95 MMOL/L (ref 96–106)
CO2 SERPL-SCNC: 28 MMOL/L (ref 20–29)
CREAT SERPL-MCNC: 0.69 MG/DL (ref 0.57–1)
GLUCOSE SERPL-MCNC: 47 MG/DL (ref 65–99)
POTASSIUM SERPL-SCNC: 4.1 MMOL/L (ref 3.5–5.2)
SODIUM SERPL-SCNC: 136 MMOL/L (ref 134–144)

## 2020-11-19 RX ORDER — AMLODIPINE BESYLATE 10 MG/1
TABLET ORAL
Qty: 90 TAB | Refills: 4 | Status: SHIPPED
Start: 2020-11-19 | End: 2021-06-01

## 2020-11-19 RX ORDER — IBUPROFEN 200 MG
TABLET ORAL
Qty: 30 PATCH | Refills: 1 | Status: SHIPPED
Start: 2020-11-19 | End: 2021-06-01

## 2020-12-04 ENCOUNTER — TELEPHONE (OUTPATIENT)
Dept: FAMILY MEDICINE CLINIC | Age: 70
End: 2020-12-04

## 2020-12-04 NOTE — TELEPHONE ENCOUNTER
Pt called back and request for records must be faxed to Dr. Marcial Berger with [facility] at [phone].  Harrym

## 2020-12-04 NOTE — TELEPHONE ENCOUNTER
Pt called stating her psychiatrist advised her to contact Dr. Luis F Garza regarding anemia found on labs done, stating her hemoglobin was around 8, but has history of anemia with gastric bypass and will take Slo Fe or additional iron if needed. Pt wants to know if Dr. Luis F Garza received reports or if results were from labs Dr. Luis F Garza ordered. Pt agrees to contact psychiatrist's office to have her results faxed to Dr. Luis F Garza for review.  Porfirio

## 2021-02-03 ENCOUNTER — VIRTUAL VISIT (OUTPATIENT)
Dept: FAMILY MEDICINE CLINIC | Age: 71
End: 2021-02-03
Payer: MEDICARE

## 2021-02-03 DIAGNOSIS — F60.9 PERSONALITY DISORDER (HCC): ICD-10-CM

## 2021-02-03 DIAGNOSIS — F39 MOOD DISORDER (HCC): ICD-10-CM

## 2021-02-03 DIAGNOSIS — F43.10 PTSD (POST-TRAUMATIC STRESS DISORDER): ICD-10-CM

## 2021-02-03 DIAGNOSIS — M79.7 FIBROMYALGIA: Primary | ICD-10-CM

## 2021-02-03 DIAGNOSIS — M05.9 RHEUMATOID ARTHRITIS WITH POSITIVE RHEUMATOID FACTOR, INVOLVING UNSPECIFIED SITE (HCC): ICD-10-CM

## 2021-02-03 PROCEDURE — 99443 PR PHYS/QHP TELEPHONE EVALUATION 21-30 MIN: CPT | Performed by: FAMILY MEDICINE

## 2021-02-03 NOTE — PROGRESS NOTES
Chief Complaint   Patient presents with    Referral Follow Up     PT referral - pt states she is having all over/feels stiff     Referral Follow Up     Pt would Dr. Les Mcknight to request for her to have addtional hours with her mental health provider- [place]

## 2021-02-03 NOTE — PROGRESS NOTES
Satnam Beltrán is a 78 y.o. female evaluated via telephone on 2/3/2021. Consent:  She and/or health care decision maker is aware that she may receive a bill for this telephone service, depending on her insurance coverage, and has provided verbal consent to proceed: Yes      Documentation:  I communicated with the patient and/or health care decision maker about her rheumatological problems and mental health problems. Details of this discussion including any medical advice provided:       Subjective:   Satnam Beltrán was seen for Referral Follow Up (PT referral - pt states she is having all over/feels stiff ) and Referral Follow Up (Pt would Dr. Sheila Zarate to request for her to have addtional hours with her mental health provider- [facility])      Patient presents with:  Referral Follow Up: PT referral - pt states she is having all over/feels stiff   Referral Follow Up: Pt would Dr. Sheila Zarate to request for her to have addtional hours with her mental health provider- [facility]    Her pain has been present for a week and all over. It is getting worse. This is a new problem. She has Rheumatoid Arthritis but does not see anyone because she does not want to take any medication. She has a friend that is a psych nurse who told her this could be \"mental\". She is using a mental health skill building company and she has been allotted 2 hours every 2 weeks. They have recommended 85 hours every 3 months. Evidently, she needs more emotional support and accountability for day to day self care/ADLs like bathing, eating, etc..  She does not have a support system. Currently, she is seeing psychiatry every 3 months and plans to call them about this today. Review of Systems   Constitutional: Positive for malaise/fatigue. Negative for weight loss. No weight gain   Respiratory: Negative for shortness of breath. Cardiovascular: Negative for chest pain and palpitations.    Musculoskeletal: Positive for joint pain and myalgias. Psychiatric/Behavioral: Positive for depression. Negative for hallucinations, substance abuse and suicidal ideas. The patient is not nervous/anxious and does not have insomnia. Objective:         Assessment & Plan:   Diagnoses and all orders for this visit:    1. Fibromyalgia  -     REFERRAL TO RHEUMATOLOGY    2. Rheumatoid arthritis with positive rheumatoid factor, involving unspecified site ([facility]  -     REFERRAL TO RHEUMATOLOGY    3. Mood disorder ([facility]    4. Personality disorder ([facility]    5. PTSD (post-traumatic stress disorder)        Rheumatology referral  It sounds reasonable for her to increase her hours, however, I am deferring this to her psychiatrist and counselor as they will be able to provide more detailed documentation    Follow-up and Dispositions    · Return in about 6 weeks (around 3/17/2021) for blood pressure. Reviewed plan of care. Patient has provided input and agrees with goals. I affirm this is a Patient Initiated Episode with an Established Patient who has not had a related appointment within my department in the past 7 days or scheduled within the next 24 hours.     Total Time: minutes: 21-30 minutes    Note: not billable if this call serves to triage the patient into an appointment for the relevant concern      Sosa Giraldo MD

## 2021-02-03 NOTE — PROGRESS NOTES
Kristen Juarez is a 78 y.o. female who was seen by synchronous (real-time) audio-video technology on 2/3/2021. Assessment & Plan:  
{There are no diagnoses linked to this encounter. (Refresh or delete this SmartLink)} *** 
 
{Assessment and Plan:46524} Reviewed plan of care. Patient has provided input and agrees with goals. CPT Codes 92542-08787 for Established Patients may apply to this Telehealth Visit {Time-based coding, delete if not needed:84781::\"I spent at least 15 minutes with this established patient, and >50% of the time was spent counseling and/or coordinating care regarding ***\"} Subjective:  
Kristen Juarez was seen for Referral Follow Up (PT referral - pt states she is having all over/feels stiff ) and Referral Follow Up (Pt would Dr. Thee Grissom to request for her to have addtional hours with her mental health provider- [facility]) HPI 
 
 
ROS Objective:  
 
Physical Exam 
 
Due to this being a TeleHealth evaluation, many elements of the physical examination are unable to be assessed. We discussed the expected course, resolution and complications of the diagnosis(es) in detail. Medication risks, benefits, costs, interactions, and alternatives were discussed as indicated. I advised her to contact the office if her condition worsens, changes or fails to improve as anticipated. She expressed understanding with the diagnosis(es) and plan. Pursuant to the emergency declaration under the [facility], [facility] waiver authority and the [employer] Act, this Virtual  Visit was conducted, with patient's consent, to reduce the patient's risk of exposure to COVID-19 and provide continuity of care for an established patient. Services were provided through a video synchronous discussion virtually to substitute for in-person clinic visit.  
 
Lucía Nielson MD

## 2021-05-12 DIAGNOSIS — G62.9 NEUROPATHY: ICD-10-CM

## 2021-05-13 DIAGNOSIS — G62.9 NEUROPATHY: ICD-10-CM

## 2021-05-13 RX ORDER — GABAPENTIN 800 MG/1
TABLET ORAL
Qty: 270 TAB | Refills: 0 | Status: SHIPPED | OUTPATIENT
Start: 2021-05-13 | End: 2021-07-14 | Stop reason: SDUPTHER

## 2021-05-13 RX ORDER — GABAPENTIN 800 MG/1
800 TABLET ORAL 3 TIMES DAILY
Qty: 270 TAB | Refills: 1 | Status: CANCELLED | OUTPATIENT
Start: 2021-05-13

## 2021-05-13 RX ORDER — METAXALONE 800 MG/1
800 TABLET ORAL 3 TIMES DAILY
Qty: 90 TAB | Refills: 1 | Status: SHIPPED
Start: 2021-05-13 | End: 2021-06-01

## 2021-06-01 ENCOUNTER — VIRTUAL VISIT (OUTPATIENT)
Dept: FAMILY MEDICINE CLINIC | Age: 71
End: 2021-06-01
Payer: MEDICARE

## 2021-06-01 DIAGNOSIS — F10.10 ALCOHOL ABUSE: ICD-10-CM

## 2021-06-01 DIAGNOSIS — I10 ESSENTIAL HYPERTENSION: Primary | ICD-10-CM

## 2021-06-01 DIAGNOSIS — Z91.199 NON-COMPLIANCE WITH TREATMENT: ICD-10-CM

## 2021-06-01 PROCEDURE — G8399 PT W/DXA RESULTS DOCUMENT: HCPCS | Performed by: FAMILY MEDICINE

## 2021-06-01 PROCEDURE — G8421 BMI NOT CALCULATED: HCPCS | Performed by: FAMILY MEDICINE

## 2021-06-01 PROCEDURE — G8536 NO DOC ELDER MAL SCRN: HCPCS | Performed by: FAMILY MEDICINE

## 2021-06-01 PROCEDURE — 3017F COLORECTAL CA SCREEN DOC REV: CPT | Performed by: FAMILY MEDICINE

## 2021-06-01 PROCEDURE — 1090F PRES/ABSN URINE INCON ASSESS: CPT | Performed by: FAMILY MEDICINE

## 2021-06-01 PROCEDURE — G8510 SCR DEP NEG, NO PLAN REQD: HCPCS | Performed by: FAMILY MEDICINE

## 2021-06-01 PROCEDURE — G8756 NO BP MEASURE DOC: HCPCS | Performed by: FAMILY MEDICINE

## 2021-06-01 PROCEDURE — G8427 DOCREV CUR MEDS BY ELIG CLIN: HCPCS | Performed by: FAMILY MEDICINE

## 2021-06-01 PROCEDURE — 99214 OFFICE O/P EST MOD 30 MIN: CPT | Performed by: FAMILY MEDICINE

## 2021-06-01 PROCEDURE — 1101F PT FALLS ASSESS-DOCD LE1/YR: CPT | Performed by: FAMILY MEDICINE

## 2021-06-01 RX ORDER — AMLODIPINE BESYLATE 5 MG/1
5 TABLET ORAL DAILY
Qty: 30 TABLET | Refills: 1 | Status: SHIPPED | OUTPATIENT
Start: 2021-06-01 | End: 2021-10-01 | Stop reason: SDUPTHER

## 2021-06-01 NOTE — PROGRESS NOTES
Chief Complaint   Patient presents with    Hypertension     Follow up     1. Have you been to the ER, urgent care clinic since your last visit? Hospitalized since your last visit? No    2. Have you seen or consulted any other health care providers outside of the [address] [place] [address] since your last visit? Include any pap smears or colon screening.  No

## 2021-06-01 NOTE — PROGRESS NOTES
Tammy Maldonado is a 78 y.o. female who was seen by synchronous (real-time) audio-video technology on 6/1/2021. Assessment & Plan:   Diagnoses and all orders for this visit:    1. Essential hypertension  -     amLODIPine (Norvasc) 5 mg tablet; Take 1 Tablet by mouth daily. 2. Non-compliance with treatment    3. Alcohol abuse        Blood pressure elevated, non-compliant with medication  Abstaining from alcohol  Add back Norvasc at a lower dose  Advised to consult with me prior to stopping medications in the future    Follow-up and Dispositions    · Return in about 6 weeks (around 7/13/2021) for blood pressure, AWV. Reviewed plan of care. Patient has provided input and agrees with goals. CPT Codes 38990-17439 for Established Patients may apply to this Telehealth Visit      Subjective:   Tammy Maldonado was seen for Hypertension (Follow up)      Tammy Maldonado is here to follow up on their HTN. This is a chronic problem. .  She stopped all of her blood pressure medications because her top number went down. She has a history of alcohol abuse and has had none for 3 years. Also, she stopped smoking recently. Review of Systems   Eyes: Negative for blurred vision. Respiratory: Negative for shortness of breath. Cardiovascular: Negative for chest pain. Neurological: Negative for dizziness, sensory change, speech change, focal weakness and headaches. Objective:   /97, P 68    Physical Exam  Constitutional:       General: She is not in acute distress. Appearance: Normal appearance. Neurological:      Mental Status: She is alert and oriented to person, place, and time. Due to this being a TeleHealth evaluation, many elements of the physical examination are unable to be assessed. We discussed the expected course, resolution and complications of the diagnosis(es) in detail.   Medication risks, benefits, costs, interactions, and alternatives were discussed as indicated. I advised her to contact the office if her condition worsens, changes or fails to improve as anticipated. She expressed understanding with the diagnosis(es) and plan. Pursuant to the emergency declaration under the [facility], [facility] waiver authority and the [employer] and [employer] Act, this Virtual  Visit was conducted, with patient's consent, to reduce the patient's risk of exposure to COVID-19 and provide continuity of care for an established patient. Services were provided through a video synchronous discussion virtually to substitute for in-person clinic visit.     Jessica Worthington MD

## 2021-06-03 ENCOUNTER — TELEPHONE (OUTPATIENT)
Dept: FAMILY MEDICINE CLINIC | Age: 71
End: 2021-06-03

## 2021-06-03 NOTE — TELEPHONE ENCOUNTER
Returned pt's call. Left a voice message, advising I was doing so and to call office back when able.

## 2021-06-03 NOTE — TELEPHONE ENCOUNTER
----- Message from Venita Patrick sent at 6/3/2021 10:14 AM EDT -----  Regarding: Dr. Martin Hands / telephone  General Message/Vendor Calls    Caller's first and last name: Ms Veronica Skinner       Reason for call: Wants to speak w/nurse regarding BP issues she's having.       Callback required yes/no and why: yes please to discuss      Best contact number(s): [phone]      Details to clarify the request:      Venita Patrick

## 2021-07-14 ENCOUNTER — OFFICE VISIT (OUTPATIENT)
Dept: FAMILY MEDICINE CLINIC | Age: 71
End: 2021-07-14
Payer: MEDICARE

## 2021-07-14 VITALS
WEIGHT: 145 LBS | RESPIRATION RATE: 16 BRPM | HEIGHT: 64 IN | DIASTOLIC BLOOD PRESSURE: 67 MMHG | SYSTOLIC BLOOD PRESSURE: 105 MMHG | HEART RATE: 66 BPM | TEMPERATURE: 96.8 F | OXYGEN SATURATION: 98 % | BODY MASS INDEX: 24.75 KG/M2

## 2021-07-14 DIAGNOSIS — Z00.00 MEDICARE ANNUAL WELLNESS VISIT, SUBSEQUENT: Primary | ICD-10-CM

## 2021-07-14 DIAGNOSIS — G62.9 NEUROPATHY: ICD-10-CM

## 2021-07-14 DIAGNOSIS — Z12.31 ENCOUNTER FOR SCREENING MAMMOGRAM FOR MALIGNANT NEOPLASM OF BREAST: ICD-10-CM

## 2021-07-14 PROCEDURE — G0439 PPPS, SUBSEQ VISIT: HCPCS | Performed by: FAMILY MEDICINE

## 2021-07-14 PROCEDURE — 3017F COLORECTAL CA SCREEN DOC REV: CPT | Performed by: FAMILY MEDICINE

## 2021-07-14 PROCEDURE — 1101F PT FALLS ASSESS-DOCD LE1/YR: CPT | Performed by: FAMILY MEDICINE

## 2021-07-14 PROCEDURE — G8427 DOCREV CUR MEDS BY ELIG CLIN: HCPCS | Performed by: FAMILY MEDICINE

## 2021-07-14 PROCEDURE — G8432 DEP SCR NOT DOC, RNG: HCPCS | Performed by: FAMILY MEDICINE

## 2021-07-14 PROCEDURE — G9899 SCRN MAM PERF RSLTS DOC: HCPCS | Performed by: FAMILY MEDICINE

## 2021-07-14 PROCEDURE — G8536 NO DOC ELDER MAL SCRN: HCPCS | Performed by: FAMILY MEDICINE

## 2021-07-14 PROCEDURE — G8399 PT W/DXA RESULTS DOCUMENT: HCPCS | Performed by: FAMILY MEDICINE

## 2021-07-14 PROCEDURE — G8752 SYS BP LESS 140: HCPCS | Performed by: FAMILY MEDICINE

## 2021-07-14 PROCEDURE — G8754 DIAS BP LESS 90: HCPCS | Performed by: FAMILY MEDICINE

## 2021-07-14 PROCEDURE — G8420 CALC BMI NORM PARAMETERS: HCPCS | Performed by: FAMILY MEDICINE

## 2021-07-14 RX ORDER — GABAPENTIN 800 MG/1
TABLET ORAL
Qty: 270 TABLET | Refills: 1 | Status: SHIPPED | OUTPATIENT
Start: 2021-07-14 | End: 2021-09-07 | Stop reason: SDUPTHER

## 2021-07-14 NOTE — PROGRESS NOTES
This is the Subsequent Medicare Annual Wellness Exam, performed 12 months or more after the Initial AWV or the last Subsequent AWV    I have reviewed the patient's medical history in detail and updated the computerized patient record. Assessment/Plan   Education and counseling provided:  Are appropriate based on today's review and evaluation  End-of-Life planning (with patient's consent)   Patient plans to complete an advanced directive and bring it in  [address] was discussed but the patient did not wish or was not able to name a surrogate decision maker or provide an [address]       1. Medicare annual wellness visit, subsequent  2. Encounter for screening mammogram for malignant neoplasm of breast  -     [facility] 3D JEVON W MAMMO BI SCREENING INCL CAD; Future  3. Neuropathy  -     gabapentin (NEURONTIN) 800 mg tablet; TAKE 1 TABLET BY MOUTH 3 TIMES A DAY, Normal, Disp-270 Tablet, R-1        Follow-up and Dispositions    · Return in about 6 months (around 1/14/2022) for blood pressure. Reviewed plan of care. Patient has provided input and agrees with goals. Depression Risk Factor Screening     3 most recent PHQ Screens 6/1/2021   PHQ Not Done Active Diagnosis of Depression or Bipolar Disorder   Little interest or pleasure in doing things Not at all   Feeling down, depressed, irritable, or hopeless Several days   Total Score PHQ 2 1       Alcohol Risk Screen   AUDIT Screen Score: AUDIT Score: 0      Document Brief Intervention (corresponds directly with the 5 A's, Ask, Advise, Assess, Assist, and Arrange):  NA    At- Risk Patients (Score 7-15 for women; 8-15 for men)  Discuss concern patient is drinking at unhealthy levels known to increase risk of alcohol-related health problems. Is Patient ready to commit to change?  NA    If No:   Encourage reflection   Discuss short term and long term health risks of consuming alcohol   Barriers to change   Reaffirm willingness to help / Educational materials provided  If Yes:   Set goal  [employer] provided    Harmful use or Dependence (Score 16 or greater)   Discuss short term and long term health risks of consuming alcohol   Recommendations   Negotiate drinking goal   Recommend addiction specialist/center   Arrange follow-up appointments. Functional Ability and Level of Safety    Hearing: Hearing is good. Activities of Daily Living:  ADL Assessment 7/14/2021   Feeding yourself No Help Needed   Getting from bed to chair No Help Needed   Getting dressed No Help Needed   Bathing or showering No Help Needed   Walk across the room (includes cane/walker) No Help Needed   Using the telphone No Help Needed   Taking your medications No Help Needed   Preparing meals No Help Needed   Managing money (expenses/bills) No Help Needed   Moderately strenuous housework (laundry) No Help Needed   Shopping for personal items (toiletries/medicines) No Help Needed   Shopping for groceries No Help Needed   Driving No Help Needed   Climbing a flight of stairs No Help Needed   Getting to places beyond walking distances No Help Needed             Ambulation: with difficulty, uses a walker     Fall Risk:  Fall Risk Assessment, last 12 mths 7/14/2021   Able to walk? Yes   Fall in past 12 months? 0   Do you feel unsteady? 0   Are you worried about falling 0      Abuse Screen:  Abuse Screening Questionnaire 7/14/2021   Do you ever feel afraid of your partner? N   Are you in a relationship with someone who physically or mentally threatens you? N   Is it safe for you to go home?  Y            Cognitive Screening    Has your family/caregiver stated any concerns about your memory: no     Cognitive Screening: Normal - Verbal Fluency Test    Health Maintenance Due     Health Maintenance Due   Topic Date Due    Shingrix Vaccine Age 49> (2 of 2) 02/20/2020    Colorectal Cancer Screening Combo  08/16/2020    Breast Cancer Screen Mammogram  02/22/2021       Patient Care Team   Patient Care Team:  Sebastian Garcia MD as PCP - General (Family Medicine)  Sebastian Garcia MD as PCP - [address] [place] [address] Provider  Jessica Newell MD as Surgeon (General Surgery)    History     Patient Active Problem List   Diagnosis Code    PUD (peptic ulcer disease) K27.9    Alcohol abuse F10.10    Essential hypertension I10    Impaired cognition R41.89    Anxiety F41.9    PTSD (post-traumatic stress disorder) F43.10    Cannabis abuse F12.10    Non-compliance with treatment Z91.19    Personality disorder ([facility] F60.9    Fibromyalgia M79.7    Neuropathy G62.9    Mood disorder ([facility] F39     Past Medical History:   Diagnosis Date    Anxiety     Anxiety     Bipolar 2 disorder ([facility] 09/2018    Blurred vision     Depression     Fibromyalgia     Fibromyalgia     Gastrointestinal disorder     Hemorrhoids     History of bleeding peptic ulcer     with perforation. s/p repair 9/2016. 2nd bleed 6/2017    History of smoking     Hx of hepatitis C     pt states she no longer    Psychiatric disorder     Psychotic disorder ([facility]     PTSD (post-traumatic stress disorder)     Stress       Past Surgical History:   Procedure Laterality Date    HX CHOLECYSTECTOMY      HX GASTRIC BYPASS      HX OTHER SURGICAL      PEG tube     Current Outpatient Medications   Medication Sig Dispense Refill    amLODIPine (Norvasc) 5 mg tablet Take 1 Tablet by mouth daily. 30 Tablet 1    gabapentin (NEURONTIN) 800 mg tablet TAKE 1 TABLET BY MOUTH 3 TIMES A  Tab 0    divalproex DR (Depakote) 250 mg tablet Take 250 mg by mouth daily. Patient takes 250 mg in the morning and 500 mg at night.  ferrous sulfate, dried (iron) 159 mg (45 mg iron) TbER tablet Take 1 Tab by mouth daily. 90 Tab 4    amitriptyline (ELAVIL) 10 mg tablet TAKE 1 TABLET BY MOUTH DAILY AT BEDTIME 30 Tab 2    multivitamin (ONE A DAY) tablet Take 2 Tabs by mouth daily.       Vitamin B Complex No.12-Niacin 50 mg/15 mL liqd Take  by mouth.  senna leaf tea Take  by mouth.        No Known Allergies    Family History   Problem Relation Age of Onset    No Known Problems Mother      Social History     Tobacco Use    Smoking status: Former Smoker     Packs/day: 0.25    Smokeless tobacco: Never Used    Tobacco comment: quit 3 months ago   Substance Use Topics    Alcohol use: No     Comment: quit drinking one month prior         Huy Aguirre MD

## 2021-07-14 NOTE — PROGRESS NOTES
Chief Complaint   Patient presents with   [facility] Visit     has ortho apt upcoming and is in pain from RA

## 2021-07-14 NOTE — PATIENT INSTRUCTIONS
Medicare Wellness Visit, Female     The best way to live healthy is to have a lifestyle where you eat a well-balanced diet, exercise regularly, limit alcohol use, and quit all forms of tobacco/nicotine, if applicable. Regular preventive services are another way to keep healthy. Preventive services (vaccines, screening tests, monitoring & exams) can help personalize your care plan, which helps you manage your own care. Screening tests can find health problems at the earliest stages, when they are easiest to treat. [facility] follows the current, evidence-based guidelines published by the [facility] ([facility]) when recommending preventive services for our patients. Because we follow these guidelines, sometimes recommendations change over time as research supports it. (For example, mammograms used to be recommended annually. Even though Medicare will still pay for an annual mammogram, the newer guidelines recommend a mammogram every two years for women of average risk). Of course, you and your doctor may decide to screen more often for some diseases, based on your risk and your co-morbidities (chronic disease you are already diagnosed with). Preventive services for you include:  - Medicare offers their members a free annual wellness visit, which is time for you and your primary care provider to discuss and plan for your preventive service needs. Take advantage of this benefit every year!  -All adults over the age of 72 should receive the recommended pneumonia vaccines. Current USPSTF guidelines recommend a series of two vaccines for the best pneumonia protection.   -All adults should have a flu vaccine yearly and a tetanus vaccine every 10 years.   -All adults age 48 and older should receive the shingles vaccines (series of two vaccines).       -All adults age 38-68 who are overweight should have a diabetes screening test once every three years.   -All adults born between [birth year] and 1963 should be screened once for Hepatitis C.  -Other screening tests and preventive services for persons with diabetes include: an eye exam to screen for diabetic retinopathy, a kidney function test, a foot exam, and stricter control over your cholesterol.   -Cardiovascular screening for adults with routine risk involves an electrocardiogram (ECG) at intervals determined by your doctor.   -Colorectal cancer screenings should be done for adults age 54-65 with no increased risk factors for colorectal cancer. There are a number of acceptable methods of screening for this type of cancer. Each test has its own benefits and drawbacks. Discuss with your doctor what is most appropriate for you during your annual wellness visit. The different tests include: colonoscopy (considered the best screening method), a fecal occult blood test, a fecal DNA test, and sigmoidoscopy.    -A bone mass density test is recommended when a woman turns 65 to screen for osteoporosis. This test is only recommended one time, as a screening. Some providers will use this same test as a disease monitoring tool if you already have osteoporosis. -Breast cancer screenings are recommended every other year for women of normal risk, age 54-69.  -Cervical cancer screenings for women over age 72 are only recommended with certain risk factors.      Here is a list of your current Health Maintenance items (your personalized list of preventive services) with a due date:  Health Maintenance Due   Topic Date Due    Shingles Vaccine (2 of 2) 02/20/2020    Colorectal Screening  08/16/2020    Mammogram  02/22/2021

## 2021-07-28 ENCOUNTER — TRANSCRIBE ORDER (OUTPATIENT)
Dept: SCHEDULING | Age: 71
End: 2021-07-28

## 2021-07-28 DIAGNOSIS — M17.11 OSTEOARTHRITIS OF RIGHT KNEE: Primary | ICD-10-CM

## 2021-07-29 ENCOUNTER — TRANSCRIBE ORDER (OUTPATIENT)
Dept: SCHEDULING | Age: 71
End: 2021-07-29

## 2021-07-29 DIAGNOSIS — M17.11 OSTEOARTHRITIS OF RIGHT KNEE: Primary | ICD-10-CM

## 2021-08-12 ENCOUNTER — HOSPITAL ENCOUNTER (OUTPATIENT)
Dept: CT IMAGING | Age: 71
Discharge: HOME | End: 2021-08-12
Attending: ORTHOPAEDIC SURGERY
Payer: MEDICARE

## 2021-08-12 DIAGNOSIS — M17.11 OSTEOARTHRITIS OF RIGHT KNEE: ICD-10-CM

## 2021-08-12 PROCEDURE — 73700 CT LOWER EXTREMITY W/O DYE: CPT

## 2021-08-13 ENCOUNTER — TELEPHONE (OUTPATIENT)
Dept: FAMILY MEDICINE CLINIC | Age: 71
End: 2021-08-13

## 2021-08-13 DIAGNOSIS — K90.89 OTHER SPECIFIED INTESTINAL MALABSORPTION: ICD-10-CM

## 2021-08-13 DIAGNOSIS — K59.03 DRUG-INDUCED CONSTIPATION: Primary | ICD-10-CM

## 2021-08-13 RX ORDER — AMOXICILLIN 250 MG
1 CAPSULE ORAL 2 TIMES DAILY
Qty: 180 TABLET | Refills: 0 | Status: SHIPPED | OUTPATIENT
Start: 2021-08-13 | End: 2021-10-01 | Stop reason: SDUPTHER

## 2021-08-13 NOTE — TELEPHONE ENCOUNTER
Pt having problems with constipation from being on iron pills. She is taking sennaT but it is not helping. Please advise. [phone]        Pt also is requesting a refill for the iron pills but do not see on current med list. She states she takes it due to history of gastric bypass.

## 2021-08-13 NOTE — TELEPHONE ENCOUNTER
Per [facility] sent     Requested Prescriptions     Signed Prescriptions Disp Refills    senna-docusate (PERICOLACE) 8.6-50 mg per tablet 180 Tablet 0     Sig: Take 1 Tablet by mouth two (2) times a day. Authorizing Provider: Junior Torres     Ordering User: Zoë Ellison ferrous sulfate (SLOW FE) 142 mg (45 mg iron) ER tablet 90 Tablet 0     Sig: Take 1 Tablet by mouth Daily (before breakfast).      Authorizing Provider: Junior White User: Chanel Bella

## 2021-09-07 ENCOUNTER — VIRTUAL VISIT (OUTPATIENT)
Dept: FAMILY MEDICINE CLINIC | Age: 71
End: 2021-09-07
Payer: MEDICARE

## 2021-09-07 ENCOUNTER — TELEPHONE (OUTPATIENT)
Dept: FAMILY MEDICINE CLINIC | Age: 71
End: 2021-09-07

## 2021-09-07 DIAGNOSIS — G62.9 NEUROPATHY: Primary | ICD-10-CM

## 2021-09-07 DIAGNOSIS — M79.7 FIBROMYALGIA: ICD-10-CM

## 2021-09-07 PROCEDURE — 1101F PT FALLS ASSESS-DOCD LE1/YR: CPT | Performed by: FAMILY MEDICINE

## 2021-09-07 PROCEDURE — 1090F PRES/ABSN URINE INCON ASSESS: CPT | Performed by: FAMILY MEDICINE

## 2021-09-07 PROCEDURE — G8536 NO DOC ELDER MAL SCRN: HCPCS | Performed by: FAMILY MEDICINE

## 2021-09-07 PROCEDURE — G8427 DOCREV CUR MEDS BY ELIG CLIN: HCPCS | Performed by: FAMILY MEDICINE

## 2021-09-07 PROCEDURE — 99214 OFFICE O/P EST MOD 30 MIN: CPT | Performed by: FAMILY MEDICINE

## 2021-09-07 PROCEDURE — G8432 DEP SCR NOT DOC, RNG: HCPCS | Performed by: FAMILY MEDICINE

## 2021-09-07 PROCEDURE — G8756 NO BP MEASURE DOC: HCPCS | Performed by: FAMILY MEDICINE

## 2021-09-07 PROCEDURE — 3017F COLORECTAL CA SCREEN DOC REV: CPT | Performed by: FAMILY MEDICINE

## 2021-09-07 PROCEDURE — G8399 PT W/DXA RESULTS DOCUMENT: HCPCS | Performed by: FAMILY MEDICINE

## 2021-09-07 PROCEDURE — G8420 CALC BMI NORM PARAMETERS: HCPCS | Performed by: FAMILY MEDICINE

## 2021-09-07 PROCEDURE — G9899 SCRN MAM PERF RSLTS DOC: HCPCS | Performed by: FAMILY MEDICINE

## 2021-09-07 RX ORDER — METOPROLOL SUCCINATE 100 MG/1
TABLET, EXTENDED RELEASE ORAL DAILY
COMMUNITY
End: 2021-10-10

## 2021-09-07 RX ORDER — GABAPENTIN 800 MG/1
800 TABLET ORAL 4 TIMES DAILY
Qty: 360 TABLET | Refills: 0 | Status: SHIPPED | OUTPATIENT
Start: 2021-09-07 | End: 2021-10-01 | Stop reason: SDUPTHER

## 2021-09-07 NOTE — PROGRESS NOTES
Chief Complaint   Patient presents with    Medication Evaluation     Gabapentin     1. Have you been to the ER, urgent care clinic since your last visit? Hospitalized since your last visit? No    2. Have you seen or consulted any other health care providers outside of the [address] [place] [address] since your last visit? Include any pap smears or colon screening.  No

## 2021-09-07 NOTE — PROGRESS NOTES
Rodri Stevens is a 73 y.o. female who was seen by synchronous (real-time) audio-video technology on 9/7/2021. Assessment & Plan:   Diagnoses and all orders for this visit:    1. Neuropathy  -     gabapentin (NEURONTIN) 800 mg tablet; Take 1 Tablet by mouth four (4) times daily. Max Daily Amount: 3,200 mg.    2. Fibromyalgia  -     gabapentin (NEURONTIN) 800 mg tablet; Take 1 Tablet by mouth four (4) times daily. Max Daily Amount: 3,200 mg. Neuropathy needs improvement  Increase gabapentin - unconventional dosing, but not exceeding the recommended daily amount    Follow-up and Dispositions    · Return in about 6 weeks (around 10/19/2021) for neuropathic pain, fibromyalgia. Reviewed plan of care. Patient has provided input and agrees with goals. CPT Codes 39527-09233 for Established Patients may apply to this Telehealth Visit      Subjective:   Rodri Stevens was seen for Medication Evaluation (Gabapentin)      Patient presents with:  Medication Evaluation: Gabapentin    She takes this for her neuropathy. She is taking 800 mg TID, but has been on 800 mg QID in the past and did well. It is also helpful for her fibromyalgia. Review of Systems   Neurological:        Pain all over her skin that is very sensitive to touch. It is like pin pricks. Objective:     Physical Exam  Constitutional:       General: She is not in acute distress. Appearance: Normal appearance. Neurological:      Mental Status: She is alert and oriented to person, place, and time. Motor: No tremor. Psychiatric:         Attention and Perception: Attention and perception normal.         Mood and Affect: Mood normal.         Behavior: Behavior normal.         Thought Content: Thought content normal.         Judgment: Judgment normal.         Due to this being a TeleHealth evaluation, many elements of the physical examination are unable to be assessed.          We discussed the expected course, resolution and complications of the diagnosis(es) in detail. Medication risks, benefits, costs, interactions, and alternatives were discussed as indicated. I advised her to contact the office if her condition worsens, changes or fails to improve as anticipated. She expressed understanding with the diagnosis(es) and plan. Pursuant to the emergency declaration under the [address] [place] [address], [facility] waiver authority and the [email or web address] and [employer] Act, this Virtual  Visit was conducted, with patient's consent, to reduce the patient's risk of exposure to COVID-19 and provide continuity of care for an established patient. Services were provided through a video synchronous discussion virtually to substitute for in-person clinic visit.     Tati Bolanos MD

## 2021-09-07 NOTE — TELEPHONE ENCOUNTER
Pt asking if Dr Tigist Butt would increase her gabapentin 800mg from 3 times per day to 4 times per day due to still having pain from fibromyalgia.   Please call [phone]

## 2021-10-01 ENCOUNTER — TELEPHONE (OUTPATIENT)
Dept: FAMILY MEDICINE CLINIC | Age: 71
End: 2021-10-01

## 2021-10-01 ENCOUNTER — VIRTUAL VISIT (OUTPATIENT)
Dept: FAMILY MEDICINE CLINIC | Age: 71
End: 2021-10-01
Payer: MEDICARE

## 2021-10-01 DIAGNOSIS — K90.89 OTHER SPECIFIED INTESTINAL MALABSORPTION: ICD-10-CM

## 2021-10-01 DIAGNOSIS — Z98.84 HISTORY OF GASTRIC BYPASS: ICD-10-CM

## 2021-10-01 DIAGNOSIS — Z12.11 SCREEN FOR COLON CANCER: ICD-10-CM

## 2021-10-01 DIAGNOSIS — I10 ESSENTIAL HYPERTENSION: ICD-10-CM

## 2021-10-01 DIAGNOSIS — K27.9 PUD (PEPTIC ULCER DISEASE): Primary | ICD-10-CM

## 2021-10-01 DIAGNOSIS — Z72.0 TOBACCO ABUSE: ICD-10-CM

## 2021-10-01 DIAGNOSIS — G62.9 NEUROPATHY: ICD-10-CM

## 2021-10-01 DIAGNOSIS — H53.8 BLURRED VISION: ICD-10-CM

## 2021-10-01 DIAGNOSIS — K59.03 DRUG-INDUCED CONSTIPATION: ICD-10-CM

## 2021-10-01 DIAGNOSIS — M79.7 FIBROMYALGIA: Primary | ICD-10-CM

## 2021-10-01 PROCEDURE — 3017F COLORECTAL CA SCREEN DOC REV: CPT | Performed by: FAMILY MEDICINE

## 2021-10-01 PROCEDURE — G8432 DEP SCR NOT DOC, RNG: HCPCS | Performed by: FAMILY MEDICINE

## 2021-10-01 PROCEDURE — 1101F PT FALLS ASSESS-DOCD LE1/YR: CPT | Performed by: FAMILY MEDICINE

## 2021-10-01 PROCEDURE — G8420 CALC BMI NORM PARAMETERS: HCPCS | Performed by: FAMILY MEDICINE

## 2021-10-01 PROCEDURE — G8427 DOCREV CUR MEDS BY ELIG CLIN: HCPCS | Performed by: FAMILY MEDICINE

## 2021-10-01 PROCEDURE — G8536 NO DOC ELDER MAL SCRN: HCPCS | Performed by: FAMILY MEDICINE

## 2021-10-01 PROCEDURE — 1090F PRES/ABSN URINE INCON ASSESS: CPT | Performed by: FAMILY MEDICINE

## 2021-10-01 PROCEDURE — G9899 SCRN MAM PERF RSLTS DOC: HCPCS | Performed by: FAMILY MEDICINE

## 2021-10-01 PROCEDURE — G8399 PT W/DXA RESULTS DOCUMENT: HCPCS | Performed by: FAMILY MEDICINE

## 2021-10-01 PROCEDURE — G8756 NO BP MEASURE DOC: HCPCS | Performed by: FAMILY MEDICINE

## 2021-10-01 PROCEDURE — 99215 OFFICE O/P EST HI 40 MIN: CPT | Performed by: FAMILY MEDICINE

## 2021-10-01 RX ORDER — DIVALPROEX SODIUM 250 MG/1
250 TABLET, DELAYED RELEASE ORAL DAILY
Status: CANCELLED | OUTPATIENT
Start: 2021-10-01

## 2021-10-01 RX ORDER — AMOXICILLIN 250 MG
1 CAPSULE ORAL 2 TIMES DAILY
Qty: 180 TABLET | Refills: 4 | Status: SHIPPED
Start: 2021-10-01 | End: 2021-11-02 | Stop reason: SDUPTHER

## 2021-10-01 RX ORDER — NICOTINE 7MG/24HR
1 PATCH, TRANSDERMAL 24 HOURS TRANSDERMAL EVERY 24 HOURS
Qty: 30 PATCH | Refills: 0 | Status: SHIPPED | OUTPATIENT
Start: 2021-10-01 | End: 2021-10-13

## 2021-10-01 RX ORDER — TIZANIDINE 2 MG/1
2 TABLET ORAL
Qty: 120 TABLET | Refills: 1 | Status: SHIPPED
Start: 2021-10-01 | End: 2022-02-03

## 2021-10-01 RX ORDER — BISMUTH SUBSALICYLATE 262 MG
2 TABLET,CHEWABLE ORAL DAILY
Qty: 180 TABLET | Refills: 4 | Status: SHIPPED | OUTPATIENT
Start: 2021-10-01 | End: 2022-02-03 | Stop reason: SDUPTHER

## 2021-10-01 RX ORDER — GABAPENTIN 800 MG/1
800 TABLET ORAL 4 TIMES DAILY
Qty: 360 TABLET | Refills: 1 | Status: SHIPPED | OUTPATIENT
Start: 2021-10-01 | End: 2021-11-19 | Stop reason: SDUPTHER

## 2021-10-01 RX ORDER — ACETAMINOPHEN, DIPHENHYDRAMINE HCL, PHENYLEPHRINE HCL 325; 25; 5 MG/1; MG/1; MG/1
1 TABLET ORAL DAILY
Qty: 90 TABLET | Refills: 4 | Status: SHIPPED | OUTPATIENT
Start: 2021-10-01 | End: 2022-03-29 | Stop reason: SDUPTHER

## 2021-10-01 RX ORDER — ACETAMINOPHEN, DIPHENHYDRAMINE HCL, PHENYLEPHRINE HCL 325; 25; 5 MG/1; MG/1; MG/1
1 TABLET ORAL DAILY
COMMUNITY
End: 2021-10-01 | Stop reason: SDUPTHER

## 2021-10-01 RX ORDER — AMLODIPINE BESYLATE 5 MG/1
5 TABLET ORAL DAILY
Qty: 90 TABLET | Refills: 4 | Status: SHIPPED | OUTPATIENT
Start: 2021-10-01 | End: 2021-10-13

## 2021-10-01 RX ORDER — AMITRIPTYLINE HYDROCHLORIDE 10 MG/1
TABLET, FILM COATED ORAL
Qty: 30 TABLET | Refills: 2 | Status: CANCELLED | OUTPATIENT
Start: 2021-10-01

## 2021-10-01 NOTE — PROGRESS NOTES
Vaibhav Marie is a 73 y.o. female who was seen by synchronous (real-time) audio-video technology on 10/1/2021. Assessment & Plan:   Diagnoses and all orders for this visit:    1. Fibromyalgia  -     gabapentin (NEURONTIN) 800 mg tablet; Take 1 Tablet by mouth four (4) times daily. Max Daily Amount: 3,200 mg.  -     tiZANidine (ZANAFLEX) 2 mg tablet; Take 1 Tablet by mouth four (4) times daily as needed for Muscle Spasm(s) or Pain.  -     REFERRAL TO PHYSICAL THERAPY    2. Neuropathy  -     gabapentin (NEURONTIN) 800 mg tablet; Take 1 Tablet by mouth four (4) times daily. Max Daily Amount: 3,200 mg.    3. Essential hypertension  -     amLODIPine (Norvasc) 5 mg tablet; Take 1 Tablet by mouth daily.  -     METABOLIC PANEL, BASIC; Future    4. Blurred vision    5. Tobacco abuse  -     nicotine (NICODERM CQ) 7 mg/24 hr; 1 Patch by TransDERmal route every twenty-four (24) hours for 30 days. 6. Other specified intestinal malabsorption  -     ferrous sulfate (SLOW FE) 142 mg (45 mg iron) ER tablet; Take 1 Tablet by mouth Daily (before breakfast). -     multivitamin (ONE A DAY) tablet; Take 2 Tablets by mouth daily. -     cyanocobalamin, vitamin B-12, (Vitamin B-12) 5,000 mcg subl; 1 Tablet by SubLINGual route daily.  -     CBC WITH AUTOMATED DIFF; Future  -     FERRITIN; Future  -     VITAMIN B12 & FOLATE; Future    7. Drug-induced constipation  -     senna-docusate (PERICOLACE) 8.6-50 mg per tablet; Take 1 Tablet by mouth two (2) times a day. Doing well  Labs per orders. Continue current plans. Refills per orders  Nicoderm  Eye exam  Pool therapy  Tizanidine prn    Over 40 minutes spent in patient care    Follow-up and Dispositions    · Return in about 6 months (around 4/1/2022) for blood pressure, neuropathic pain. Reviewed plan of care. Patient has provided input and agrees with goals.                 CPT Codes 15698-07210 for Established Patients may apply to this Telehealth Visit      Subjective:   Katheryn Calvillo was seen for Medication Refill (Patient would like a nicotine patch and a muscle relaxer added to her refills.)      Patient presents with:  Medication Refill: Patient would like a nicotine patch and a muscle relaxer added to her refills. She is here to follow up on her fibromyalgia and neuropathic pain. In the past, she has been on a muscle relaxer, which has helped, but she does not remember what they are. Her insurance will not cover Skelaxin. The gabapentin continues to work well. Pool therapy has helped in the past.    \"I have a terrible compulsion to smoke again\" and is now smoking 3 a day. Also, she will be having her right knee replaced at the end of this month. Review of Systems   Eyes: Positive for blurred vision. Respiratory: Negative for shortness of breath. Cardiovascular: Negative for chest pain. Musculoskeletal: Positive for myalgias. Neurological: Negative for dizziness, sensory change, speech change, focal weakness and headaches. Neuropathic pain in her LE's. Psychiatric/Behavioral: Negative for depression, substance abuse and suicidal ideas. The patient is nervous/anxious. The patient does not have insomnia. Objective:   /85, P 71    Physical Exam  Constitutional:       General: She is not in acute distress. Appearance: Normal appearance. Neurological:      Mental Status: She is alert and oriented to person, place, and time. Motor: No tremor. Psychiatric:         Mood and Affect: Mood normal.         Behavior: Behavior normal.         Thought Content: Thought content normal.         Judgment: Judgment normal.         Due to this being a TeleHealth evaluation, many elements of the physical examination are unable to be assessed. We discussed the expected course, resolution and complications of the diagnosis(es) in detail.   Medication risks, benefits, costs, interactions, and alternatives were discussed as indicated. I advised her to contact the office if her condition worsens, changes or fails to improve as anticipated. She expressed understanding with the diagnosis(es) and plan. Pursuant to the emergency declaration under the [facility][note 3][facility], [facility] waiver authority and the [employer] and [employer] Act, this Virtual  Visit was conducted, with patient's consent, to reduce the patient's risk of exposure to COVID-19 and provide continuity of care for an established patient. Services were provided through a video synchronous discussion virtually to substitute for in-person clinic visit.     Osbaldo Solis MD

## 2021-10-01 NOTE — TELEPHONE ENCOUNTER
Pt called requesting referral/order be faxed to [facility] at [phone] for endoscopy and colonoscopy due to history of gastric bypass and states it's been 5 years since last colonoscopy. Referral faxed 10/1/21 to [facility].  Porfirio

## 2021-10-01 NOTE — PROGRESS NOTES
Chief Complaint   Patient presents with    Medication Refill     Patient would like a nicotine patch and a muscle relaxer added to her refills. 1. Have you been to the ER, urgent care clinic since your last visit? Hospitalized since your last visit? No    2. Have you seen or consulted any other health care providers outside of the [address] [place] [address] since your last visit? Include any pap smears or colon screening.  No

## 2021-10-04 ENCOUNTER — TELEPHONE (OUTPATIENT)
Dept: FAMILY MEDICINE CLINIC | Age: 71
End: 2021-10-04

## 2021-10-04 LAB — COLOGUARD TEST, EXTERNAL: NEGATIVE

## 2021-10-04 NOTE — TELEPHONE ENCOUNTER
Called pt, and she advises [facility] has not scheduled her with a provider, that they require her provider referral first, then will call to schedule her.

## 2021-10-04 NOTE — TELEPHONE ENCOUNTER
Pt states pharmacy told her that the prescription for the nicotine patches will need a prior authorization.

## 2021-10-08 DIAGNOSIS — Z72.0 TOBACCO ABUSE: ICD-10-CM

## 2021-10-08 RX ORDER — AMLODIPINE BESYLATE 10 MG/1
TABLET ORAL
Qty: 90 TABLET | OUTPATIENT
Start: 2021-10-08

## 2021-10-08 RX ORDER — NICOTINE 7MG/24HR
PATCH, TRANSDERMAL 24 HOURS TRANSDERMAL
OUTPATIENT
Start: 2021-10-08

## 2021-10-08 NOTE — TELEPHONE ENCOUNTER
That's unfortunate  They are available over the counter  She could also call 1800quit now for smoking cessation support

## 2021-10-08 NOTE — TELEPHONE ENCOUNTER
Nicotine Td Dis 7mg/24hr is one of the drugs that is excluded from Medicare coverage by law, and we  do not offer the drug as a supplemental benefit. Over-The-Counter (OTC) or non-prescription drugs are excluded from coverage under Medicare  rules.

## 2021-10-10 RX ORDER — METOPROLOL SUCCINATE 100 MG/1
TABLET, EXTENDED RELEASE ORAL
Qty: 90 TABLET | Refills: 3 | Status: SHIPPED | OUTPATIENT
Start: 2021-10-10 | End: 2021-11-18 | Stop reason: SDUPTHER

## 2021-10-11 NOTE — TELEPHONE ENCOUNTER
Patches were denied. Called pt, to confirm she was able to  her blood pressure medication, but no answer.

## 2021-10-13 ENCOUNTER — HOSPITAL ENCOUNTER (OUTPATIENT)
Dept: PREADMISSION TESTING | Age: 71
Discharge: HOME | End: 2021-10-13
Payer: MEDICARE

## 2021-10-13 VITALS
HEIGHT: 60 IN | BODY MASS INDEX: 30.49 KG/M2 | HEART RATE: 63 BPM | SYSTOLIC BLOOD PRESSURE: 116 MMHG | TEMPERATURE: 97 F | WEIGHT: 155.3 LBS | RESPIRATION RATE: 18 BRPM | DIASTOLIC BLOOD PRESSURE: 78 MMHG | OXYGEN SATURATION: 98 %

## 2021-10-13 LAB
ALBUMIN SERPL-MCNC: 3.8 G/DL (ref 3.5–5)
ALBUMIN/GLOB SERPL: 1.1 {RATIO} (ref 1.1–2.2)
ALP SERPL-CCNC: 76 U/L (ref 45–117)
ALT SERPL-CCNC: 34 U/L (ref 12–78)
ANION GAP SERPL CALC-SCNC: 3 MMOL/L (ref 5–15)
APPEARANCE UR: CLEAR
AST SERPL-CCNC: 36 U/L (ref 15–37)
BACTERIA URNS QL MICRO: NEGATIVE /HPF
BASOPHILS # BLD: 0.1 K/UL (ref 0–0.1)
BASOPHILS NFR BLD: 1 % (ref 0–1)
BILIRUB SERPL-MCNC: 0.4 MG/DL (ref 0.2–1)
BILIRUB UR QL: NEGATIVE
BUN SERPL-MCNC: 18 MG/DL (ref 6–20)
BUN/CREAT SERPL: 25 (ref 12–20)
CALCIUM SERPL-MCNC: 9.2 MG/DL (ref 8.5–10.1)
CHLORIDE SERPL-SCNC: 105 MMOL/L (ref 97–108)
CO2 SERPL-SCNC: 29 MMOL/L (ref 21–32)
COLOR UR: ABNORMAL
CREAT SERPL-MCNC: 0.71 MG/DL (ref 0.55–1.02)
DIFFERENTIAL METHOD BLD: NORMAL
EOSINOPHIL # BLD: 0.1 K/UL (ref 0–0.4)
EOSINOPHIL NFR BLD: 2 % (ref 0–7)
EPITH CASTS URNS QL MICRO: ABNORMAL /LPF
ERYTHROCYTE [DISTWIDTH] IN BLOOD BY AUTOMATED COUNT: 14 % (ref 11.5–14.5)
EST. AVERAGE GLUCOSE BLD GHB EST-MCNC: 114 MG/DL
GLOBULIN SER CALC-MCNC: 3.5 G/DL (ref 2–4)
GLUCOSE SERPL-MCNC: 108 MG/DL (ref 65–100)
GLUCOSE UR STRIP.AUTO-MCNC: NEGATIVE MG/DL
HBA1C MFR BLD: 5.6 % (ref 4–5.6)
HCT VFR BLD AUTO: 38.8 % (ref 35–47)
HGB BLD-MCNC: 13 G/DL (ref 11.5–16)
HGB UR QL STRIP: NEGATIVE
IMM GRANULOCYTES # BLD AUTO: 0 K/UL (ref 0–0.04)
IMM GRANULOCYTES NFR BLD AUTO: 0 % (ref 0–0.5)
KETONES UR QL STRIP.AUTO: NEGATIVE MG/DL
LEUKOCYTE ESTERASE UR QL STRIP.AUTO: ABNORMAL
LYMPHOCYTES # BLD: 2.2 K/UL (ref 0.8–3.5)
LYMPHOCYTES NFR BLD: 44 % (ref 12–49)
MCH RBC QN AUTO: 30.5 PG (ref 26–34)
MCHC RBC AUTO-ENTMCNC: 33.5 G/DL (ref 30–36.5)
MCV RBC AUTO: 91.1 FL (ref 80–99)
MONOCYTES # BLD: 0.5 K/UL (ref 0–1)
MONOCYTES NFR BLD: 9 % (ref 5–13)
NEUTS SEG # BLD: 2.2 K/UL (ref 1.8–8)
NEUTS SEG NFR BLD: 44 % (ref 32–75)
NITRITE UR QL STRIP.AUTO: NEGATIVE
NRBC # BLD: 0 K/UL (ref 0–0.01)
NRBC BLD-RTO: 0 PER 100 WBC
PH UR STRIP: 6.5 [PH] (ref 5–8)
PLATELET # BLD AUTO: 187 K/UL (ref 150–400)
POTASSIUM SERPL-SCNC: 4.4 MMOL/L (ref 3.5–5.1)
PROT SERPL-MCNC: 7.3 G/DL (ref 6.4–8.2)
PROT UR STRIP-MCNC: NEGATIVE MG/DL
RBC # BLD AUTO: 4.26 M/UL (ref 3.8–5.2)
RBC #/AREA URNS HPF: ABNORMAL /HPF (ref 0–5)
RBC MORPH BLD: NORMAL
SODIUM SERPL-SCNC: 137 MMOL/L (ref 136–145)
SP GR UR REFRACTOMETRY: 1.01 (ref 1–1.03)
UA: UC IF INDICATED,UAUC: ABNORMAL
UROBILINOGEN UR QL STRIP.AUTO: 0.2 EU/DL (ref 0.2–1)
WBC # BLD AUTO: 5.1 K/UL (ref 3.6–11)
WBC URNS QL MICRO: ABNORMAL /HPF (ref 0–4)

## 2021-10-13 PROCEDURE — 36415 COLL VENOUS BLD VENIPUNCTURE: CPT

## 2021-10-13 PROCEDURE — 81001 URINALYSIS AUTO W/SCOPE: CPT

## 2021-10-13 PROCEDURE — 80053 COMPREHEN METABOLIC PANEL: CPT

## 2021-10-13 PROCEDURE — 83036 HEMOGLOBIN GLYCOSYLATED A1C: CPT

## 2021-10-13 PROCEDURE — 85025 COMPLETE CBC W/AUTO DIFF WBC: CPT

## 2021-10-13 RX ORDER — AMLODIPINE BESYLATE 10 MG/1
10 TABLET ORAL DAILY
COMMUNITY
End: 2021-12-08

## 2021-10-13 RX ORDER — IBUPROFEN 200 MG
1 TABLET ORAL EVERY 24 HOURS
COMMUNITY
End: 2021-10-26

## 2021-10-13 RX ORDER — AMITRIPTYLINE HYDROCHLORIDE 25 MG/1
10 TABLET, FILM COATED ORAL
COMMUNITY
End: 2022-02-03 | Stop reason: SDUPTHER

## 2021-10-13 NOTE — H&P
Preoperative Evaluation                     History and Physical with Surgical Risk Stratification     10/13/2021    CC: Right knee pain    HPI:   Kristen Juarez is a 73 y.o. female referred for pre-operative evaluation by Dr. Lindwood Bumpers for surgery on 10/26/21. Marlyn Castorena notes she has had knee pain for over 10 years. She notes entire leg pain. She has some weakness. Her pain does not wake her up at night. Pain is increased with walking. She has some mild swelling. She noes she falls \"all the time\". Marlyn Castorena notes she is \"very agitated\" as she \"gets mean after 12 noon\". She does not like answering questions and notes she will be a difficult patient over night after surgery. She notes she will need a sitter to sit with me. She also notes if the IV is put in her hand she will kick out. The patient was evaluated in the surgeon's office and it was determined that the most appropriate plan of care is to proceed with surgical intervention. Patient's PCP Gege Lewis MD    Review of Systems     Constitutional: Negative for chills and fever  Throat: Negative for congestion and sore throat  Eyes: Negative for blurred vision and double vision  Respiratory: Negative for cough, shortness of breath and wheezing  Mouth: Negative for loose, broken or chipped teeth. Cardiovascular: Negative for chest pain and palpitations  Gastrointestinal: Negative for abdominal pain, nausea, diarrhea & constipation  Genitourinary: Negative for dysuria and hematuria  Musculoskeletal: Right knee pain  Skin: Negative for rash, open wounds.    Neurological: Negative for dizziness, headaches, tremors  Psychiatric: PTSD, Anxiety, (Denies Bipolar diagnosis)    Inherent Risk of Surgery     Surgical risk:  Intermediate  Intermediate:  Joint Replacement, Spinal surgery, abdominal, thoracic, carotid endarterctomy, prostate, head and neck    Patient Cardiac Risk Assessment     Revised Cardiac Risk Index (RCRI)    Rate if cardiac death, nonfatal MI, nonfatal cardiac arrest by number of risk factor- 0.4%    QUETA/AHA 2007 Guidelines:   1) Surgery Emergency, Non-cardiac -> to surgery  2) If not, look at clinical predictors    Intermediate Minor     Blood Thinner: NA    METS      EQUAL TO 4 Care for self Walk indoors around house Walk 2-3 blocks on level ground (2-3 mph) Light work around house (dust, dishes)     Other Risk Factors:   Screening for ETOH use:  Done and low risk  Smoking status:  Currently a light smoker   Marijuana Use:  No    Personal or FH of bleeding problems:  No  Personal or FH of blood clots:  No  Personal or FH of anesthesia problems:   No    Pulmonary Risk:  Asthma or COPD:  No  Body mass index is 30.33 kg/m². Known TOMER:  No    Past Medical, Surgical, Social History     Allergies: No Known Allergies      Current Outpatient Medications:     amitriptyline (ELAVIL) 25 mg tablet, Take 25 mg by mouth nightly., Disp: , Rfl:     amLODIPine (NORVASC) 10 mg tablet, Take 10 mg by mouth daily. , Disp: , Rfl:     nicotine (Nicoderm CQ) 21 mg/24 hr, 1 Patch by TransDERmal route every twenty-four (24) hours. , Disp: , Rfl:     metoprolol succinate (TOPROL-XL) 100 mg tablet, TAKE 1 TABLET BY MOUTH DAILY, Disp: 90 Tablet, Rfl: 3    senna-docusate (PERICOLACE) 8.6-50 mg per tablet, Take 1 Tablet by mouth two (2) times a day., Disp: 180 Tablet, Rfl: 4    gabapentin (NEURONTIN) 800 mg tablet, Take 1 Tablet by mouth four (4) times daily. Max Daily Amount: 3,200 mg., Disp: 360 Tablet, Rfl: 1    ferrous sulfate (SLOW FE) 142 mg (45 mg iron) ER tablet, Take 1 Tablet by mouth Daily (before breakfast). , Disp: 90 Tablet, Rfl: 4    multivitamin (ONE A DAY) tablet, Take 2 Tablets by mouth daily. , Disp: 180 Tablet, Rfl: 4    cyanocobalamin, vitamin B-12, (Vitamin B-12) 5,000 mcg subl, 1 Tablet by SubLINGual route daily. , Disp: 90 Tablet, Rfl: 4    tiZANidine (ZANAFLEX) 2 mg tablet, Take 1 Tablet by mouth four (4) times daily as needed for Muscle Spasm(s) or Pain., Disp: 120 Tablet, Rfl: 1    senna leaf tea, Take  by mouth., Disp: , Rfl:      Past Medical History:   Diagnosis Date    Anxiety     [facility] 2018    Blurred vision     COVID-19 vaccine series completed     Fibromyalgia     Hemorrhoids     Hepatitis C virus infection cured after antiviral drug therapy     History of bleeding peptic ulcer 2016 & 2017    History of smoking     PTSD (post-traumatic stress disorder)     Stress      Past Surgical History:   Procedure Laterality Date    HX BREAST REDUCTION  2020    HX CHOLECYSTECTOMY      HX GASTRIC BYPASS       Social History     Tobacco Use    Smoking status: Former Smoker     Packs/day: 0.25     Types: Cigarettes     Quit date: 2021     Years since quittin.2    Smokeless tobacco: Never Used   Vaping Use    Vaping Use: Never used   Substance Use Topics    Alcohol use: No     Comment: quit drinking one month prior    Drug use: Not Currently     Family History   Problem Relation Age of Onset    No Known Problems Mother     Anesth Problems Neg Hx        Objective     Vitals:    10/13/21 1312   BP: 116/78   Pulse: 63   Resp: 18   Temp: 97 °F (36.1 °C)   SpO2: 98%   Weight: 70.4 kg (155 lb 4.8 oz)   Height: 5' (1.524 m)       General Appearance: Alert, Well Appearing and in no distress  Mental Status: Agitated mood, slumped in chair  Neck: Normal appearance externally  Ears: External canal no drainage  Nose: Normal external appearance and no drainage   Chest: Clear to auscultation, no wheezes, rales or rhonchi  Heart: Normal rate, regular rhythm, no murmurs, rubs, clicks or gallops  Skin: Normal color, no lesions externally  Abdomen: Not examined  Neuro: Not examined  Musculoskeletal: Gait antalgic    Recent Results (from the past 168 hour(s))   METABOLIC PANEL, BASIC    Collection Time: 10/13/21  9:56 AM   Result Value Ref Range    Sodium 136 136 - 145 mmol/L    Potassium 4.5 3.5 - 5.1 mmol/L Chloride 103 97 - 108 mmol/L    CO2 29 21 - 32 mmol/L    Anion gap 4 (L) 5 - 15 mmol/L    Glucose 103 (H) 65 - 100 mg/dL    BUN 17 6 - 20 MG/DL    Creatinine 0.61 0.55 - 1.02 MG/DL    BUN/Creatinine ratio 28 (H) 12 - 20      GFR est AA >60 >60 ml/min/1.73m2    GFR est non-AA >60 >60 ml/min/1.73m2    Calcium 9.6 8.5 - 10.1 MG/DL   CBC WITH AUTOMATED DIFF    Collection Time: 10/13/21  9:56 AM   Result Value Ref Range    WBC 5.0 3.6 - 11.0 K/uL    RBC 4.28 3.80 - 5.20 M/uL    HGB 12.9 11.5 - 16.0 g/dL    HCT 39.8 35.0 - 47.0 %    MCV 93.0 80.0 - 99.0 FL    MCH 30.1 26.0 - 34.0 PG    MCHC 32.4 30.0 - 36.5 g/dL    RDW 14.1 11.5 - 14.5 %    PLATELET 219 590 - 230 K/uL    MPV 10.3 8.9 - 12.9 FL    NRBC 0.0 0  WBC    ABSOLUTE NRBC 0.00 0.00 - 0.01 K/uL    NEUTROPHILS 44 32 - 75 %    LYMPHOCYTES 44 12 - 49 %    MONOCYTES 9 5 - 13 %    EOSINOPHILS 2 0 - 7 %    BASOPHILS 1 0 - 1 %    IMMATURE GRANULOCYTES 0 0.0 - 0.5 %    ABS. NEUTROPHILS 2.2 1.8 - 8.0 K/UL    ABS. LYMPHOCYTES 2.2 0.8 - 3.5 K/UL    ABS. MONOCYTES 0.4 0.0 - 1.0 K/UL    ABS. EOSINOPHILS 0.1 0.0 - 0.4 K/UL    ABS. BASOPHILS 0.0 0.0 - 0.1 K/UL    ABS. IMM.  GRANS. 0.0 0.00 - 0.04 K/UL    DF AUTOMATED     FERRITIN    Collection Time: 10/13/21  9:56 AM   Result Value Ref Range    Ferritin 18 (L) 26 - 388 NG/ML   VITAMIN B12 & FOLATE    Collection Time: 10/13/21  9:56 AM   Result Value Ref Range    Vitamin B12 1,174 (H) 193 - 986 pg/mL    Folate 22.7 (H) 5.0 - 21.0 ng/mL   CBC WITH AUTOMATED DIFF    Collection Time: 10/13/21  2:08 PM   Result Value Ref Range    WBC 5.1 3.6 - 11.0 K/uL    RBC 4.26 3.80 - 5.20 M/uL    HGB 13.0 11.5 - 16.0 g/dL    HCT 38.8 35.0 - 47.0 %    MCV 91.1 80.0 - 99.0 FL    MCH 30.5 26.0 - 34.0 PG    MCHC 33.5 30.0 - 36.5 g/dL    RDW 14.0 11.5 - 14.5 %    PLATELET 213 973 - 341 K/uL    NRBC 0.0 0  WBC    ABSOLUTE NRBC 0.00 0.00 - 0.01 K/uL    NEUTROPHILS 44 32 - 75 %    LYMPHOCYTES 44 12 - 49 %    MONOCYTES 9 5 - 13 % EOSINOPHILS 2 0 - 7 %    BASOPHILS 1 0 - 1 %    IMMATURE GRANULOCYTES 0 0.0 - 0.5 %    ABS. NEUTROPHILS 2.2 1.8 - 8.0 K/UL    ABS. LYMPHOCYTES 2.2 0.8 - 3.5 K/UL    ABS. MONOCYTES 0.5 0.0 - 1.0 K/UL    ABS. EOSINOPHILS 0.1 0.0 - 0.4 K/UL    ABS. BASOPHILS 0.1 0.0 - 0.1 K/UL    ABS. IMM. GRANS. 0.0 0.00 - 0.04 K/UL    DF AUTOMATED      RBC COMMENTS NORMOCYTIC, NORMOCHROMIC     METABOLIC PANEL, COMPREHENSIVE    Collection Time: 10/13/21  2:08 PM   Result Value Ref Range    Sodium 137 136 - 145 mmol/L    Potassium 4.4 3.5 - 5.1 mmol/L    Chloride 105 97 - 108 mmol/L    CO2 29 21 - 32 mmol/L    Anion gap 3 (L) 5 - 15 mmol/L    Glucose 108 (H) 65 - 100 mg/dL    BUN 18 6 - 20 MG/DL    Creatinine 0.71 0.55 - 1.02 MG/DL    BUN/Creatinine ratio 25 (H) 12 - 20      GFR est AA >60 >60 ml/min/1.73m2    GFR est non-AA >60 >60 ml/min/1.73m2    Calcium 9.2 8.5 - 10.1 MG/DL    Bilirubin, total 0.4 0.2 - 1.0 MG/DL    ALT (SGPT) 34 12 - 78 U/L    AST (SGOT) 36 15 - 37 U/L    Alk. phosphatase 76 45 - 117 U/L    Protein, total 7.3 6.4 - 8.2 g/dL    Albumin 3.8 3.5 - 5.0 g/dL    Globulin 3.5 2.0 - 4.0 g/dL    A-G Ratio 1.1 1.1 - 2.2     HEMOGLOBIN A1C WITH EAG    Collection Time: 10/13/21  2:08 PM   Result Value Ref Range    Hemoglobin A1c 5.6 4.0 - 5.6 %    Est. average glucose 114 mg/dL   CULTURE, MRSA    Collection Time: 10/13/21  2:08 PM    Specimen: Nares; Nasal   Result Value Ref Range    Special Requests: NO SPECIAL REQUESTS      Culture result: MRSA NOT PRESENT      Culture result:        Screening of patient nares for MRSA is for surveillance purposes and, if positive, to facilitate isolation considerations in high risk settings. It is not intended for automatic decolonization interventions per se as regimens are not sufficiently effective to warrant routine use.    URINALYSIS W/ REFLEX CULTURE    Collection Time: 10/13/21  2:08 PM    Specimen: Urine   Result Value Ref Range    Color YELLOW/STRAW      Appearance CLEAR CLEAR Specific gravity 1.006 1.003 - 1.030      pH (UA) 6.5 5.0 - 8.0      Protein Negative NEG mg/dL    Glucose Negative NEG mg/dL    Ketone Negative NEG mg/dL    Bilirubin Negative NEG      Blood Negative NEG      Urobilinogen 0.2 0.2 - 1.0 EU/dL    Nitrites Negative NEG      Leukocyte Esterase MODERATE (A) NEG      WBC 0-4 0 - 4 /hpf    RBC 0-5 0 - 5 /hpf    Epithelial cells FEW FEW /lpf    Bacteria Negative NEG /hpf    UA:UC IF INDICATED CULTURE NOT INDICATED BY UA RESULT CNI         Assessment and Plan     Assessment/Plan:   1) OA Right Knee       Pre-Operative Evaluation    Plan:  RTK  Labs reviewed. MRSA negative    Patient has not come back yet for her EKG. Will have nurse call her again. Preoperative Risk Stratification:    Per RCRI, the patient has a 0.4% risk of cardiac death, nonfatal MI, nonfatal cardiac arrest based on no risk factors. Per ACC/AHA guidelines, patient is low risk for a(n) intermediate risk surgery and may proceed to planned surgery with the above noted risk.     Jamri Fowler NP

## 2021-10-13 NOTE — H&P (VIEW-ONLY)
Preoperative Evaluation                     History and Physical with Surgical Risk Stratification     10/13/2021    CC: Right knee pain    HPI:   Donte Steward is a 73 y.o. female referred for pre-operative evaluation by Dr. Ghazal Corrales for surgery on 10/26/21. Charlycourtney Madisons notes she has had knee pain for over 10 years. She notes entire leg pain. She has some weakness. Her pain does not wake her up at night. Pain is increased with walking. She has some mild swelling. She noes she falls \"all the time\". Rogers Hernandez notes she is \"very agitated\" as she \"gets mean after 12 noon\". She does not like answering questions and notes she will be a difficult patient over night after surgery. She notes she will need a sitter to sit with me. She also notes if the IV is put in her hand she will kick out. The patient was evaluated in the surgeon's office and it was determined that the most appropriate plan of care is to proceed with surgical intervention. Patient's PCP Maryan Andres MD    Review of Systems     Constitutional: Negative for chills and fever  Throat: Negative for congestion and sore throat  Eyes: Negative for blurred vision and double vision  Respiratory: Negative for cough, shortness of breath and wheezing  Mouth: Negative for loose, broken or chipped teeth. Cardiovascular: Negative for chest pain and palpitations  Gastrointestinal: Negative for abdominal pain, nausea, diarrhea & constipation  Genitourinary: Negative for dysuria and hematuria  Musculoskeletal: Right knee pain  Skin: Negative for rash, open wounds.    Neurological: Negative for dizziness, headaches, tremors  Psychiatric: PTSD, Anxiety, (Denies Bipolar diagnosis)    Inherent Risk of Surgery     Surgical risk:  Intermediate  Intermediate:  Joint Replacement, Spinal surgery, abdominal, thoracic, carotid endarterctomy, prostate, head and neck    Patient Cardiac Risk Assessment     Revised Cardiac Risk Index (RCRI)    Rate if cardiac death, nonfatal MI, nonfatal cardiac arrest by number of risk factor- 0.4%    QUETA/AHA 2007 Guidelines:   1) Surgery Emergency, Non-cardiac -> to surgery  2) If not, look at clinical predictors    Intermediate Minor     Blood Thinner: NA    METS      EQUAL TO 4 Care for self Walk indoors around house Walk 2-3 blocks on level ground (2-3 mph) Light work around house (dust, dishes)     Other Risk Factors:   Screening for ETOH use:  Done and low risk  Smoking status:  Currently a light smoker   Marijuana Use:  No    Personal or FH of bleeding problems:  No  Personal or FH of blood clots:  No  Personal or FH of anesthesia problems:   No    Pulmonary Risk:  Asthma or COPD:  No  Body mass index is 30.33 kg/m². Known TOMER:  No    Past Medical, Surgical, Social History     Allergies: No Known Allergies      Current Outpatient Medications:     amitriptyline (ELAVIL) 25 mg tablet, Take 25 mg by mouth nightly., Disp: , Rfl:     amLODIPine (NORVASC) 10 mg tablet, Take 10 mg by mouth daily. , Disp: , Rfl:     nicotine (Nicoderm CQ) 21 mg/24 hr, 1 Patch by TransDERmal route every twenty-four (24) hours. , Disp: , Rfl:     metoprolol succinate (TOPROL-XL) 100 mg tablet, TAKE 1 TABLET BY MOUTH DAILY, Disp: 90 Tablet, Rfl: 3    senna-docusate (PERICOLACE) 8.6-50 mg per tablet, Take 1 Tablet by mouth two (2) times a day., Disp: 180 Tablet, Rfl: 4    gabapentin (NEURONTIN) 800 mg tablet, Take 1 Tablet by mouth four (4) times daily. Max Daily Amount: 3,200 mg., Disp: 360 Tablet, Rfl: 1    ferrous sulfate (SLOW FE) 142 mg (45 mg iron) ER tablet, Take 1 Tablet by mouth Daily (before breakfast). , Disp: 90 Tablet, Rfl: 4    multivitamin (ONE A DAY) tablet, Take 2 Tablets by mouth daily. , Disp: 180 Tablet, Rfl: 4    cyanocobalamin, vitamin B-12, (Vitamin B-12) 5,000 mcg subl, 1 Tablet by SubLINGual route daily. , Disp: 90 Tablet, Rfl: 4    tiZANidine (ZANAFLEX) 2 mg tablet, Take 1 Tablet by mouth four (4) times daily as needed for Muscle Spasm(s) or Pain., Disp: 120 Tablet, Rfl: 1    senna leaf tea, Take  by mouth., Disp: , Rfl:      Past Medical History:   Diagnosis Date    Anxiety     [facility]    Blurred vision     COVID-19 vaccine series completed     Fibromyalgia     Hemorrhoids     Hepatitis C virus infection cured after antiviral drug therapy     History of bleeding peptic ulcer 2016 & 2017    History of smoking     PTSD (post-traumatic stress disorder)     Stress      Past Surgical History:   Procedure Laterality Date    HX BREAST REDUCTION  2020    HX CHOLECYSTECTOMY      HX GASTRIC BYPASS       Social History     Tobacco Use    Smoking status: Former Smoker     Packs/day: 0.25     Types: Cigarettes     Quit date: 2021     Years since quittin.2    Smokeless tobacco: Never Used   Vaping Use    Vaping Use: Never used   Substance Use Topics    Alcohol use: No     Comment: quit drinking one month prior    Drug use: Not Currently     Family History   Problem Relation Age of Onset    No Known Problems Mother     Anesth Problems Neg Hx        Objective     Vitals:    10/13/21 1312   BP: 116/78   Pulse: 63   Resp: 18   Temp: 97 °F (36.1 °C)   SpO2: 98%   Weight: 70.4 kg (155 lb 4.8 oz)   Height: 5' (1.524 m)       General Appearance: Alert, Well Appearing and in no distress  Mental Status: Agitated mood, slumped in chair  Neck: Normal appearance externally  Ears: External canal no drainage  Nose: Normal external appearance and no drainage   Chest: Clear to auscultation, no wheezes, rales or rhonchi  Heart: Normal rate, regular rhythm, no murmurs, rubs, clicks or gallops  Skin: Normal color, no lesions externally  Abdomen: Not examined  Neuro: Not examined  Musculoskeletal: Gait antalgic    Recent Results (from the past 168 hour(s))   METABOLIC PANEL, BASIC    Collection Time: 10/13/21  9:56 AM   Result Value Ref Range    Sodium 136 136 - 145 mmol/L    Potassium 4.5 3.5 - 5.1 mmol/L Chloride 103 97 - 108 mmol/L    CO2 29 21 - 32 mmol/L    Anion gap 4 (L) 5 - 15 mmol/L    Glucose 103 (H) 65 - 100 mg/dL    BUN 17 6 - 20 MG/DL    Creatinine 0.61 0.55 - 1.02 MG/DL    BUN/Creatinine ratio 28 (H) 12 - 20      GFR est AA >60 >60 ml/min/1.73m2    GFR est non-AA >60 >60 ml/min/1.73m2    Calcium 9.6 8.5 - 10.1 MG/DL   CBC WITH AUTOMATED DIFF    Collection Time: 10/13/21  9:56 AM   Result Value Ref Range    WBC 5.0 3.6 - 11.0 K/uL    RBC 4.28 3.80 - 5.20 M/uL    HGB 12.9 11.5 - 16.0 g/dL    HCT 39.8 35.0 - 47.0 %    MCV 93.0 80.0 - 99.0 FL    MCH 30.1 26.0 - 34.0 PG    MCHC 32.4 30.0 - 36.5 g/dL    RDW 14.1 11.5 - 14.5 %    PLATELET 157 068 - 527 K/uL    MPV 10.3 8.9 - 12.9 FL    NRBC 0.0 0  WBC    ABSOLUTE NRBC 0.00 0.00 - 0.01 K/uL    NEUTROPHILS 44 32 - 75 %    LYMPHOCYTES 44 12 - 49 %    MONOCYTES 9 5 - 13 %    EOSINOPHILS 2 0 - 7 %    BASOPHILS 1 0 - 1 %    IMMATURE GRANULOCYTES 0 0.0 - 0.5 %    ABS. NEUTROPHILS 2.2 1.8 - 8.0 K/UL    ABS. LYMPHOCYTES 2.2 0.8 - 3.5 K/UL    ABS. MONOCYTES 0.4 0.0 - 1.0 K/UL    ABS. EOSINOPHILS 0.1 0.0 - 0.4 K/UL    ABS. BASOPHILS 0.0 0.0 - 0.1 K/UL    ABS. IMM.  GRANS. 0.0 0.00 - 0.04 K/UL    DF AUTOMATED     FERRITIN    Collection Time: 10/13/21  9:56 AM   Result Value Ref Range    Ferritin 18 (L) 26 - 388 NG/ML   VITAMIN B12 & FOLATE    Collection Time: 10/13/21  9:56 AM   Result Value Ref Range    Vitamin B12 1,174 (H) 193 - 986 pg/mL    Folate 22.7 (H) 5.0 - 21.0 ng/mL   CBC WITH AUTOMATED DIFF    Collection Time: 10/13/21  2:08 PM   Result Value Ref Range    WBC 5.1 3.6 - 11.0 K/uL    RBC 4.26 3.80 - 5.20 M/uL    HGB 13.0 11.5 - 16.0 g/dL    HCT 38.8 35.0 - 47.0 %    MCV 91.1 80.0 - 99.0 FL    MCH 30.5 26.0 - 34.0 PG    MCHC 33.5 30.0 - 36.5 g/dL    RDW 14.0 11.5 - 14.5 %    PLATELET 020 151 - 840 K/uL    NRBC 0.0 0  WBC    ABSOLUTE NRBC 0.00 0.00 - 0.01 K/uL    NEUTROPHILS 44 32 - 75 %    LYMPHOCYTES 44 12 - 49 %    MONOCYTES 9 5 - 13 % EOSINOPHILS 2 0 - 7 %    BASOPHILS 1 0 - 1 %    IMMATURE GRANULOCYTES 0 0.0 - 0.5 %    ABS. NEUTROPHILS 2.2 1.8 - 8.0 K/UL    ABS. LYMPHOCYTES 2.2 0.8 - 3.5 K/UL    ABS. MONOCYTES 0.5 0.0 - 1.0 K/UL    ABS. EOSINOPHILS 0.1 0.0 - 0.4 K/UL    ABS. BASOPHILS 0.1 0.0 - 0.1 K/UL    ABS. IMM. GRANS. 0.0 0.00 - 0.04 K/UL    DF AUTOMATED      RBC COMMENTS NORMOCYTIC, NORMOCHROMIC     METABOLIC PANEL, COMPREHENSIVE    Collection Time: 10/13/21  2:08 PM   Result Value Ref Range    Sodium 137 136 - 145 mmol/L    Potassium 4.4 3.5 - 5.1 mmol/L    Chloride 105 97 - 108 mmol/L    CO2 29 21 - 32 mmol/L    Anion gap 3 (L) 5 - 15 mmol/L    Glucose 108 (H) 65 - 100 mg/dL    BUN 18 6 - 20 MG/DL    Creatinine 0.71 0.55 - 1.02 MG/DL    BUN/Creatinine ratio 25 (H) 12 - 20      GFR est AA >60 >60 ml/min/1.73m2    GFR est non-AA >60 >60 ml/min/1.73m2    Calcium 9.2 8.5 - 10.1 MG/DL    Bilirubin, total 0.4 0.2 - 1.0 MG/DL    ALT (SGPT) 34 12 - 78 U/L    AST (SGOT) 36 15 - 37 U/L    Alk. phosphatase 76 45 - 117 U/L    Protein, total 7.3 6.4 - 8.2 g/dL    Albumin 3.8 3.5 - 5.0 g/dL    Globulin 3.5 2.0 - 4.0 g/dL    A-G Ratio 1.1 1.1 - 2.2     HEMOGLOBIN A1C WITH EAG    Collection Time: 10/13/21  2:08 PM   Result Value Ref Range    Hemoglobin A1c 5.6 4.0 - 5.6 %    Est. average glucose 114 mg/dL   CULTURE, MRSA    Collection Time: 10/13/21  2:08 PM    Specimen: Nares; Nasal   Result Value Ref Range    Special Requests: NO SPECIAL REQUESTS      Culture result: MRSA NOT PRESENT      Culture result:        Screening of patient nares for MRSA is for surveillance purposes and, if positive, to facilitate isolation considerations in high risk settings. It is not intended for automatic decolonization interventions per se as regimens are not sufficiently effective to warrant routine use.    URINALYSIS W/ REFLEX CULTURE    Collection Time: 10/13/21  2:08 PM    Specimen: Urine   Result Value Ref Range    Color YELLOW/STRAW      Appearance CLEAR CLEAR Specific gravity 1.006 1.003 - 1.030      pH (UA) 6.5 5.0 - 8.0      Protein Negative NEG mg/dL    Glucose Negative NEG mg/dL    Ketone Negative NEG mg/dL    Bilirubin Negative NEG      Blood Negative NEG      Urobilinogen 0.2 0.2 - 1.0 EU/dL    Nitrites Negative NEG      Leukocyte Esterase MODERATE (A) NEG      WBC 0-4 0 - 4 /hpf    RBC 0-5 0 - 5 /hpf    Epithelial cells FEW FEW /lpf    Bacteria Negative NEG /hpf    UA:UC IF INDICATED CULTURE NOT INDICATED BY UA RESULT CNI         Assessment and Plan     Assessment/Plan:   1) OA Right Knee       Pre-Operative Evaluation    Plan:  RTK  Labs reviewed. MRSA negative    Patient has not come back yet for her EKG. Will have nurse call her again. Preoperative Risk Stratification:    Per RCRI, the patient has a 0.4% risk of cardiac death, nonfatal MI, nonfatal cardiac arrest based on no risk factors. Per ACC/AHA guidelines, patient is low risk for a(n) intermediate risk surgery and may proceed to planned surgery with the above noted risk.     Priya Swenson NP

## 2021-10-13 NOTE — PERIOP NOTES
[address] [facility] [address], [address] [facility]   MAIN OR                                  [phone]   MAIN PRE OP                          [phone]                                                                                AMBULATORY PRE OP          [phone]  PRE-ADMISSION TESTING    [phone]   Surgery Date:  Tuesday 10/26/21       Is surgery arrival time given by surgeon? YES  NO  If NO, [facility] staff will call you between 3 and 7pm the day before your surgery with your arrival time. (If your surgery is on a Monday, we will call you the Friday before.)    Call [phone] after 7pm Monday-Friday if you did not receive this call. INSTRUCTIONS BEFORE YOUR SURGERY   When You  Arrive Arrive at the [address] [facility] on the day of your surgery  Have your insurance card, photo ID, and any copayment (if needed)   Food   and   Drink NO food or drink after midnight the night before surgery    This means NO water, gum, mints, coffee, juice, etc.  No alcohol (beer, wine, liquor) 24 hours before and after surgery   Medications to   TAKE   Morning of Surgery MEDICATIONS TO TAKE THE MORNING OF SURGERY WITH A SIP OF WATER:        Amlodipine        Metoprolol       Gabapentin     Medications  To  STOP      7 days before surgery  Non-Steroidal anti-inflammatory Drugs (NSAID's): for example, Ibuprofen (Advil, Motrin), Naproxen (Aleve)   Aspirin, if taking for pain    Herbal supplements, vitamins, and fish oil   Other:  (Pain medications not listed above, including Tylenol may be taken)   Blood  Thinners     Bathing Clothing  Jewelry  Valuables      If you shower the morning of surgery, please do not apply anything to your skin (lotions, powders, deodorant, or makeup, especially mascara)   Follow Chlorhexidine [employer] body wash instructions provided to you during PAT appointment. Begin 3 days prior to surgery.    Do not shave or trim anywhere 24 hours before surgery   Wear your hair loose or down; no pony-tails, buns, or metal hair clips   Wear loose, comfortable, clean clothes   Wear glasses instead of contacts  [address], valuables, and jewelry, including body piercings, at home   If you were given an [employer], bring it on day of surgery. Going Home - or Spending the Night  SAME-DAY SURGERY: You must have a responsible adult drive you home and stay with you 24 hours after surgery   ADMITS: If your doctor is keeping you in the hospital after surgery, leave personal belongings/luggage in your car until you have a hospital room number. Hospital discharge time is 12 noon  Drivers must be here before 12 noon unless you are told differently   Special Instructions It is now mandated that all surgical patients be tested for COVID-19 prior to surgery. Testing has to be exactly 4 days prior to surgery. Your COVID test date is  Friday 10/22/21 between 8:00 am and 11:00 am.       COVID testing will be performed curbside at the [facility] [address]. There will be signs leading you to the testing site. You will need to bring a photo ID with you to be swabbed. Patients are advised to self-quarantine at home after testing and prior to your surgery date. You will be notified if your results are positive.     What to watch for:   Coronavirus (COVID-19) affects different people in different ways   It also appears with a wide range of symptoms from mild to severe   Signs usually appear 2-14 days after exposure     If you develop any of the following, notify your doctor immediately:  o Fever  o Chills, with or without a shiver  o Muscle pain  o Headache  o Sore throat  o Dry cough  o New loss of taste or smell  o Tiredness      If you develop any of the following, call 911:  o Shortness of breath  o Difficulty breathing  o Chest pain  o New confusion  o Blueness of fingers and/or lips Follow all instructions so your surgery wont be cancelled. Please, be on time. If a situation occurs and you are delayed the day of surgery, call [phone]. If your physical condition changes (like a fever, cold, flu, etc.) call your surgeon. Home medication(s) reviewed and verified via    LIST   VERBAL   during PAT appointment. The patient was contacted by     IN-PERSON    The patient verbalizes understanding of all instructions and      DOES NOT   need reinforcement.

## 2021-10-14 LAB
BACTERIA SPEC CULT: NORMAL
BACTERIA SPEC CULT: NORMAL
SERVICE CMNT-IMP: NORMAL

## 2021-10-18 ENCOUNTER — TRANSCRIBE ORDER (OUTPATIENT)
Dept: REGISTRATION | Age: 71
End: 2021-10-18

## 2021-10-18 ENCOUNTER — HOSPITAL ENCOUNTER (OUTPATIENT)
Dept: NON INVASIVE DIAGNOSTICS | Age: 71
Discharge: HOME | End: 2021-10-18
Payer: MEDICARE

## 2021-10-18 DIAGNOSIS — Z01.818 PRE-OP EVALUATION: ICD-10-CM

## 2021-10-18 DIAGNOSIS — Z01.818 PRE-OP EVALUATION: Primary | ICD-10-CM

## 2021-10-18 LAB
ATRIAL RATE: 61 BPM
CALCULATED P AXIS, ECG09: 55 DEGREES
CALCULATED R AXIS, ECG10: 36 DEGREES
CALCULATED T AXIS, ECG11: 42 DEGREES
DIAGNOSIS, 93000: NORMAL
P-R INTERVAL, ECG05: 190 MS
Q-T INTERVAL, ECG07: 388 MS
QRS DURATION, ECG06: 82 MS
QTC CALCULATION (BEZET), ECG08: 390 MS
VENTRICULAR RATE, ECG03: 61 BPM

## 2021-10-18 PROCEDURE — 93005 ELECTROCARDIOGRAM TRACING: CPT

## 2021-10-19 ENCOUNTER — TELEPHONE (OUTPATIENT)
Dept: FAMILY MEDICINE CLINIC | Age: 71
End: 2021-10-19

## 2021-10-19 NOTE — TELEPHONE ENCOUNTER
Pt states her last after visit summary is not up to date. Needs to give nurse additional info.   Please call [phone]

## 2021-10-20 NOTE — FACE TO FACE
The patient was provided a virtual link to view the pre-operative Joint Replacement Class Video  A Patient Education Book specific to total hip or knee joint replacement surgery was given to the patient in [facility]. The content of the class was presented using an audio power point presentation specific for patients undergoing total hip and knee replacement surgery. Incentive spirometer and CHG bath kits were verbally reviewed. Day of surgery routine and expectations, hospital routine and expectations, nutrition, alcohol, nicotine, medications, infection control, pain management, DVT precautions and equipment, ice therapy, durable medical equipment, exercises, mobility expectations and precautions, home preparation and safety were reviewed in class video. My contact information was shared with the patient to provide further information as requested by the patient related to their upcoming surgery. Patient states that she watched online joint class, but decided to come to in person class today as well, she arrived after class was over.

## 2021-10-22 ENCOUNTER — HOSPITAL ENCOUNTER (OUTPATIENT)
Dept: PREADMISSION TESTING | Age: 71
Discharge: HOME | End: 2021-10-22
Payer: MEDICARE

## 2021-10-22 PROCEDURE — U0005 INFEC AGEN DETEC AMPLI PROBE: HCPCS

## 2021-10-23 LAB
SARS-COV-2, XPLCVT: NOT DETECTED
SOURCE, COVRS: NORMAL

## 2021-10-25 ENCOUNTER — ANESTHESIA EVENT (OUTPATIENT)
Dept: SURGERY | Age: 71
End: 2021-10-25
Payer: MEDICARE

## 2021-10-26 ENCOUNTER — HOSPITAL ENCOUNTER (OUTPATIENT)
Age: 71
Setting detail: OUTPATIENT SURGERY
Discharge: HOME | End: 2021-10-26
Attending: ORTHOPAEDIC SURGERY | Admitting: ORTHOPAEDIC SURGERY
Payer: MEDICARE

## 2021-10-26 ENCOUNTER — APPOINTMENT (OUTPATIENT)
Dept: GENERAL RADIOLOGY | Age: 71
End: 2021-10-26
Attending: STUDENT IN AN ORGANIZED HEALTH CARE EDUCATION/TRAINING PROGRAM
Payer: MEDICARE

## 2021-10-26 ENCOUNTER — ANESTHESIA (OUTPATIENT)
Dept: SURGERY | Age: 71
End: 2021-10-26
Payer: MEDICARE

## 2021-10-26 VITALS
SYSTOLIC BLOOD PRESSURE: 101 MMHG | WEIGHT: 152.12 LBS | HEIGHT: 60 IN | DIASTOLIC BLOOD PRESSURE: 66 MMHG | BODY MASS INDEX: 29.86 KG/M2 | TEMPERATURE: 97.5 F | HEART RATE: 68 BPM | RESPIRATION RATE: 19 BRPM | OXYGEN SATURATION: 97 %

## 2021-10-26 DIAGNOSIS — Z96.651 S/P TKR (TOTAL KNEE REPLACEMENT) USING CEMENT, RIGHT: Primary | ICD-10-CM

## 2021-10-26 PROCEDURE — 74011000250 HC RX REV CODE- 250: Performed by: ANESTHESIOLOGY

## 2021-10-26 PROCEDURE — 77030028907 HC WRP KNEE WO BGS SOLM -B

## 2021-10-26 PROCEDURE — 77030040361 HC SLV COMPR DVT MDII -B

## 2021-10-26 PROCEDURE — 76210000032 HC AMBSU PH I REC 3 TO 3.5 HR: Performed by: ORTHOPAEDIC SURGERY

## 2021-10-26 PROCEDURE — 77030029820: Performed by: ORTHOPAEDIC SURGERY

## 2021-10-26 PROCEDURE — 74011250636 HC RX REV CODE- 250/636: Performed by: ANESTHESIOLOGY

## 2021-10-26 PROCEDURE — 77030010785: Performed by: ORTHOPAEDIC SURGERY

## 2021-10-26 PROCEDURE — 77030038149 HC BLD SAW SAG STRY -D: Performed by: ORTHOPAEDIC SURGERY

## 2021-10-26 PROCEDURE — C1776 JOINT DEVICE (IMPLANTABLE): HCPCS | Performed by: ORTHOPAEDIC SURGERY

## 2021-10-26 PROCEDURE — 97116 GAIT TRAINING THERAPY: CPT

## 2021-10-26 PROCEDURE — 76030000022 HC AMB SURG 2.5 TO 3 HR INTENSV-TIER 1: Performed by: ORTHOPAEDIC SURGERY

## 2021-10-26 PROCEDURE — 74011000250 HC RX REV CODE- 250: Performed by: ORTHOPAEDIC SURGERY

## 2021-10-26 PROCEDURE — 74011250636 HC RX REV CODE- 250/636: Performed by: ORTHOPAEDIC SURGERY

## 2021-10-26 PROCEDURE — 77030038613 HC SUT PDS STRATA SPIRL J&J -B: Performed by: ORTHOPAEDIC SURGERY

## 2021-10-26 PROCEDURE — 77030041680 HC PNCL ELECSURG SMK EVAC CNMD -B: Performed by: ORTHOPAEDIC SURGERY

## 2021-10-26 PROCEDURE — 77030038023 HC PIN FIX MAKO STRY -B: Performed by: ORTHOPAEDIC SURGERY

## 2021-10-26 PROCEDURE — 77030040922 HC BLNKT HYPOTHRM STRY -A

## 2021-10-26 PROCEDURE — 74011250637 HC RX REV CODE- 250/637: Performed by: ANESTHESIOLOGY

## 2021-10-26 PROCEDURE — 74011000258 HC RX REV CODE- 258: Performed by: ANESTHESIOLOGY

## 2021-10-26 PROCEDURE — 77030000032 HC CUF TRNQT ZIMM -B: Performed by: ORTHOPAEDIC SURGERY

## 2021-10-26 PROCEDURE — 77030002933 HC SUT MCRYL J&J -A: Performed by: ORTHOPAEDIC SURGERY

## 2021-10-26 PROCEDURE — 64447 NJX AA&/STRD FEMORAL NRV IMG: CPT

## 2021-10-26 PROCEDURE — 77030031139 HC SUT VCRL2 J&J -A: Performed by: ORTHOPAEDIC SURGERY

## 2021-10-26 PROCEDURE — 77030007866 HC KT SPN ANES BBMI -B

## 2021-10-26 PROCEDURE — 77030033138 HC SUT PGA STRATFX J&J -B: Performed by: ORTHOPAEDIC SURGERY

## 2021-10-26 PROCEDURE — 97161 PT EVAL LOW COMPLEX 20 MIN: CPT

## 2021-10-26 PROCEDURE — 76210000050 HC AMBSU PH II REC 0.5 TO 1 HR: Performed by: ORTHOPAEDIC SURGERY

## 2021-10-26 PROCEDURE — 2709999900 HC NON-CHARGEABLE SUPPLY: Performed by: ORTHOPAEDIC SURGERY

## 2021-10-26 PROCEDURE — 77030003601 HC NDL NRV BLK BBMI -A

## 2021-10-26 PROCEDURE — 77030033067 HC SUT PDO STRATFX SPIR J&J -B: Performed by: ORTHOPAEDIC SURGERY

## 2021-10-26 PROCEDURE — 77030041075 HC DRSG AG OPTIFRM MDII -B: Performed by: ORTHOPAEDIC SURGERY

## 2021-10-26 PROCEDURE — 73560 X-RAY EXAM OF KNEE 1 OR 2: CPT

## 2021-10-26 PROCEDURE — C1713 ANCHOR/SCREW BN/BN,TIS/BN: HCPCS | Performed by: ORTHOPAEDIC SURGERY

## 2021-10-26 PROCEDURE — 77030006835 HC BLD SAW SAG STRY -B: Performed by: ORTHOPAEDIC SURGERY

## 2021-10-26 PROCEDURE — 76060000065 HC AMB SURG ANES 2.5 TO 3 HR: Performed by: ORTHOPAEDIC SURGERY

## 2021-10-26 PROCEDURE — 77030029828 HC FEM TIB CKPNT KT DISP STRY -B: Performed by: ORTHOPAEDIC SURGERY

## 2021-10-26 DEVICE — CRUCIATE RETAINING FEMORAL
Type: IMPLANTABLE DEVICE | Site: KNEE | Status: FUNCTIONAL
Brand: TRIATHLON

## 2021-10-26 DEVICE — KNEE K1 TOT HEMI STD CEM -- IMPL CAPPED K1: Type: IMPLANTABLE DEVICE | Site: KNEE | Status: FUNCTIONAL

## 2021-10-26 DEVICE — TIBIAL BEARING INSERT - CS
Type: IMPLANTABLE DEVICE | Site: KNEE | Status: FUNCTIONAL
Brand: TRIATHLON

## 2021-10-26 DEVICE — ASYMMETRIC PATELLA
Type: IMPLANTABLE DEVICE | Site: KNEE | Status: FUNCTIONAL
Brand: TRIATHLON

## 2021-10-26 DEVICE — UNIVERSAL TIBIAL BASEPLATE
Type: IMPLANTABLE DEVICE | Site: KNEE | Status: FUNCTIONAL
Brand: TRIATHLON

## 2021-10-26 DEVICE — CEMENT BNE 40GM FULL DOSE PMMA W/O ANTIBIO M VISC RADPQ: Type: IMPLANTABLE DEVICE | Site: KNEE | Status: FUNCTIONAL

## 2021-10-26 RX ORDER — TIZANIDINE 4 MG/1
2 TABLET ORAL
Status: CANCELLED | OUTPATIENT
Start: 2021-10-26

## 2021-10-26 RX ORDER — FENTANYL CITRATE 50 UG/ML
25 INJECTION, SOLUTION INTRAMUSCULAR; INTRAVENOUS
Status: DISCONTINUED | OUTPATIENT
Start: 2021-10-26 | End: 2021-10-26 | Stop reason: HOSPADM

## 2021-10-26 RX ORDER — ACETAMINOPHEN 500 MG
1000 TABLET ORAL EVERY 6 HOURS
Status: CANCELLED | OUTPATIENT
Start: 2021-10-26

## 2021-10-26 RX ORDER — OXYCODONE HYDROCHLORIDE 5 MG/1
10 TABLET ORAL
Status: CANCELLED | OUTPATIENT
Start: 2021-10-26

## 2021-10-26 RX ORDER — GUAIFENESIN 100 MG/5ML
81 LIQUID (ML) ORAL 2 TIMES DAILY
Qty: 60 TABLET | Refills: 0 | Status: SHIPPED | OUTPATIENT
Start: 2021-10-26 | End: 2021-11-25

## 2021-10-26 RX ORDER — METOPROLOL SUCCINATE 50 MG/1
100 TABLET, EXTENDED RELEASE ORAL DAILY
Status: CANCELLED | OUTPATIENT
Start: 2021-10-26

## 2021-10-26 RX ORDER — KETOROLAC TROMETHAMINE 30 MG/ML
15 INJECTION, SOLUTION INTRAMUSCULAR; INTRAVENOUS
Status: COMPLETED | OUTPATIENT
Start: 2021-10-26 | End: 2021-10-26

## 2021-10-26 RX ORDER — TRAMADOL HYDROCHLORIDE 50 MG/1
50 TABLET ORAL
Qty: 30 TABLET | Refills: 0 | Status: SHIPPED | OUTPATIENT
Start: 2021-10-26 | End: 2021-11-05

## 2021-10-26 RX ORDER — FAMOTIDINE 20 MG/1
20 TABLET, FILM COATED ORAL 2 TIMES DAILY
Status: CANCELLED | OUTPATIENT
Start: 2021-10-26

## 2021-10-26 RX ORDER — HYDROMORPHONE HYDROCHLORIDE 1 MG/ML
0.5 INJECTION, SOLUTION INTRAMUSCULAR; INTRAVENOUS; SUBCUTANEOUS
Status: CANCELLED | OUTPATIENT
Start: 2021-10-26 | End: 2021-10-27

## 2021-10-26 RX ORDER — POVIDONE-IODINE 10 %
SOLUTION, NON-ORAL TOPICAL AS NEEDED
Status: DISCONTINUED | OUTPATIENT
Start: 2021-10-26 | End: 2021-10-26 | Stop reason: HOSPADM

## 2021-10-26 RX ORDER — VANCOMYCIN HYDROCHLORIDE 1 G/20ML
INJECTION, POWDER, LYOPHILIZED, FOR SOLUTION INTRAVENOUS AS NEEDED
Status: DISCONTINUED | OUTPATIENT
Start: 2021-10-26 | End: 2021-10-26 | Stop reason: HOSPADM

## 2021-10-26 RX ORDER — POLYETHYLENE GLYCOL 3350 17 G/17G
17 POWDER, FOR SOLUTION ORAL DAILY
Status: CANCELLED | OUTPATIENT
Start: 2021-10-26

## 2021-10-26 RX ORDER — LIDOCAINE HYDROCHLORIDE 10 MG/ML
0.1 INJECTION, SOLUTION EPIDURAL; INFILTRATION; INTRACAUDAL; PERINEURAL AS NEEDED
Status: DISCONTINUED | OUTPATIENT
Start: 2021-10-26 | End: 2021-10-26 | Stop reason: HOSPADM

## 2021-10-26 RX ORDER — SODIUM CHLORIDE 0.9 % (FLUSH) 0.9 %
5-40 SYRINGE (ML) INJECTION AS NEEDED
Status: CANCELLED | OUTPATIENT
Start: 2021-10-26

## 2021-10-26 RX ORDER — PROPOFOL 10 MG/ML
INJECTION, EMULSION INTRAVENOUS
Status: DISCONTINUED | OUTPATIENT
Start: 2021-10-26 | End: 2021-10-26 | Stop reason: HOSPADM

## 2021-10-26 RX ORDER — MIDAZOLAM HYDROCHLORIDE 1 MG/ML
INJECTION, SOLUTION INTRAMUSCULAR; INTRAVENOUS AS NEEDED
Status: DISCONTINUED | OUTPATIENT
Start: 2021-10-26 | End: 2021-10-26 | Stop reason: HOSPADM

## 2021-10-26 RX ORDER — DEXAMETHASONE SODIUM PHOSPHATE 4 MG/ML
4 INJECTION, SOLUTION INTRA-ARTICULAR; INTRALESIONAL; INTRAMUSCULAR; INTRAVENOUS; SOFT TISSUE
Status: CANCELLED | OUTPATIENT
Start: 2021-10-26

## 2021-10-26 RX ORDER — FENTANYL CITRATE 50 UG/ML
INJECTION, SOLUTION INTRAMUSCULAR; INTRAVENOUS AS NEEDED
Status: DISCONTINUED | OUTPATIENT
Start: 2021-10-26 | End: 2021-10-26 | Stop reason: HOSPADM

## 2021-10-26 RX ORDER — AMITRIPTYLINE HYDROCHLORIDE 25 MG/1
25 TABLET, FILM COATED ORAL
Status: CANCELLED | OUTPATIENT
Start: 2021-10-26

## 2021-10-26 RX ORDER — AMOXICILLIN 250 MG
1 CAPSULE ORAL 2 TIMES DAILY
Status: CANCELLED | OUTPATIENT
Start: 2021-10-26

## 2021-10-26 RX ORDER — ROPIVACAINE HYDROCHLORIDE 5 MG/ML
INJECTION, SOLUTION EPIDURAL; INFILTRATION; PERINEURAL AS NEEDED
Status: DISCONTINUED | OUTPATIENT
Start: 2021-10-26 | End: 2021-10-26 | Stop reason: HOSPADM

## 2021-10-26 RX ORDER — DIPHENHYDRAMINE HYDROCHLORIDE 50 MG/ML
12.5 INJECTION, SOLUTION INTRAMUSCULAR; INTRAVENOUS AS NEEDED
Status: DISCONTINUED | OUTPATIENT
Start: 2021-10-26 | End: 2021-10-26 | Stop reason: HOSPADM

## 2021-10-26 RX ORDER — OXYCODONE HYDROCHLORIDE 5 MG/1
10 TABLET ORAL
Status: COMPLETED | OUTPATIENT
Start: 2021-10-26 | End: 2021-10-26

## 2021-10-26 RX ORDER — OXYCODONE HYDROCHLORIDE 5 MG/1
5 TABLET ORAL
Qty: 42 TABLET | Refills: 0 | Status: SHIPPED | OUTPATIENT
Start: 2021-10-26 | End: 2021-11-05

## 2021-10-26 RX ORDER — FACIAL-BODY WIPES
10 EACH TOPICAL DAILY PRN
Status: CANCELLED | OUTPATIENT
Start: 2021-10-28

## 2021-10-26 RX ORDER — SODIUM CHLORIDE, SODIUM LACTATE, POTASSIUM CHLORIDE, CALCIUM CHLORIDE 600; 310; 30; 20 MG/100ML; MG/100ML; MG/100ML; MG/100ML
125 INJECTION, SOLUTION INTRAVENOUS CONTINUOUS
Status: DISCONTINUED | OUTPATIENT
Start: 2021-10-26 | End: 2021-10-26 | Stop reason: HOSPADM

## 2021-10-26 RX ORDER — OXYCODONE HCL 10 MG/1
10 TABLET, FILM COATED, EXTENDED RELEASE ORAL ONCE
Status: COMPLETED | OUTPATIENT
Start: 2021-10-26 | End: 2021-10-26

## 2021-10-26 RX ORDER — FLUMAZENIL 0.1 MG/ML
0.2 INJECTION INTRAVENOUS
Status: DISCONTINUED | OUTPATIENT
Start: 2021-10-26 | End: 2021-10-26 | Stop reason: HOSPADM

## 2021-10-26 RX ORDER — CELECOXIB 100 MG/1
200 CAPSULE ORAL 2 TIMES DAILY
Status: CANCELLED | OUTPATIENT
Start: 2021-10-26

## 2021-10-26 RX ORDER — OXYCODONE HYDROCHLORIDE 5 MG/1
5 TABLET ORAL
Status: CANCELLED | OUTPATIENT
Start: 2021-10-26

## 2021-10-26 RX ORDER — IBUPROFEN 200 MG
1 TABLET ORAL EVERY 24 HOURS
Status: CANCELLED | OUTPATIENT
Start: 2021-10-26

## 2021-10-26 RX ORDER — CELECOXIB 200 MG/1
200 CAPSULE ORAL 2 TIMES DAILY
Qty: 60 CAPSULE | Refills: 2 | Status: SHIPPED | OUTPATIENT
Start: 2021-10-26 | End: 2022-01-24

## 2021-10-26 RX ORDER — HYDROMORPHONE HYDROCHLORIDE 1 MG/ML
.25-1 INJECTION, SOLUTION INTRAMUSCULAR; INTRAVENOUS; SUBCUTANEOUS
Status: DISCONTINUED | OUTPATIENT
Start: 2021-10-26 | End: 2021-10-26 | Stop reason: HOSPADM

## 2021-10-26 RX ORDER — KETOROLAC TROMETHAMINE 30 MG/ML
15 INJECTION, SOLUTION INTRAMUSCULAR; INTRAVENOUS EVERY 6 HOURS
Status: CANCELLED | OUTPATIENT
Start: 2021-10-26 | End: 2021-10-27

## 2021-10-26 RX ORDER — GABAPENTIN 800 MG/1
800 TABLET ORAL 4 TIMES DAILY
Status: CANCELLED | OUTPATIENT
Start: 2021-10-26

## 2021-10-26 RX ORDER — NALOXONE HYDROCHLORIDE 0.4 MG/ML
0.2 INJECTION, SOLUTION INTRAMUSCULAR; INTRAVENOUS; SUBCUTANEOUS
Status: DISCONTINUED | OUTPATIENT
Start: 2021-10-26 | End: 2021-10-26 | Stop reason: HOSPADM

## 2021-10-26 RX ORDER — HYDROXYZINE HYDROCHLORIDE 10 MG/1
10 TABLET, FILM COATED ORAL
Status: CANCELLED | OUTPATIENT
Start: 2021-10-26

## 2021-10-26 RX ORDER — SODIUM CHLORIDE 0.9 % (FLUSH) 0.9 %
5-40 SYRINGE (ML) INJECTION EVERY 8 HOURS
Status: CANCELLED | OUTPATIENT
Start: 2021-10-26

## 2021-10-26 RX ORDER — ASPIRIN 81 MG/1
81 TABLET ORAL 2 TIMES DAILY
Status: CANCELLED | OUTPATIENT
Start: 2021-10-26

## 2021-10-26 RX ORDER — AMOXICILLIN 250 MG
1 CAPSULE ORAL DAILY
Qty: 14 TABLET | Refills: 0 | Status: SHIPPED | OUTPATIENT
Start: 2021-10-26 | End: 2021-11-02

## 2021-10-26 RX ORDER — ACETAMINOPHEN 325 MG/1
975 TABLET ORAL ONCE
Status: COMPLETED | OUTPATIENT
Start: 2021-10-26 | End: 2021-10-26

## 2021-10-26 RX ORDER — AMLODIPINE BESYLATE 5 MG/1
10 TABLET ORAL DAILY
Status: CANCELLED | OUTPATIENT
Start: 2021-10-26

## 2021-10-26 RX ORDER — SODIUM CHLORIDE 9 MG/ML
125 INJECTION, SOLUTION INTRAVENOUS CONTINUOUS
Status: CANCELLED | OUTPATIENT
Start: 2021-10-26 | End: 2021-10-27

## 2021-10-26 RX ORDER — NALOXONE HYDROCHLORIDE 0.4 MG/ML
0.4 INJECTION, SOLUTION INTRAMUSCULAR; INTRAVENOUS; SUBCUTANEOUS AS NEEDED
Status: CANCELLED | OUTPATIENT
Start: 2021-10-26

## 2021-10-26 RX ORDER — ONDANSETRON 4 MG/1
4 TABLET, ORALLY DISINTEGRATING ORAL
Qty: 21 TABLET | Refills: 0 | Status: SHIPPED | OUTPATIENT
Start: 2021-10-26 | End: 2022-03-25

## 2021-10-26 RX ORDER — POLYETHYLENE GLYCOL 3350 17 G/17G
17 POWDER, FOR SOLUTION ORAL DAILY
Qty: 14 PACKET | Refills: 0 | Status: SHIPPED | OUTPATIENT
Start: 2021-10-26 | End: 2021-11-09

## 2021-10-26 RX ORDER — BUPIVACAINE HYDROCHLORIDE 5 MG/ML
INJECTION, SOLUTION EPIDURAL; INTRACAUDAL AS NEEDED
Status: DISCONTINUED | OUTPATIENT
Start: 2021-10-26 | End: 2021-10-26 | Stop reason: HOSPADM

## 2021-10-26 RX ORDER — SODIUM CHLORIDE, SODIUM LACTATE, POTASSIUM CHLORIDE, CALCIUM CHLORIDE 600; 310; 30; 20 MG/100ML; MG/100ML; MG/100ML; MG/100ML
INJECTION, SOLUTION INTRAVENOUS
Status: DISCONTINUED | OUTPATIENT
Start: 2021-10-26 | End: 2021-10-26 | Stop reason: HOSPADM

## 2021-10-26 RX ORDER — ONDANSETRON 2 MG/ML
4 INJECTION INTRAMUSCULAR; INTRAVENOUS
Status: CANCELLED | OUTPATIENT
Start: 2021-10-26 | End: 2021-10-27

## 2021-10-26 RX ORDER — CELECOXIB 100 MG/1
100 CAPSULE ORAL ONCE
Status: COMPLETED | OUTPATIENT
Start: 2021-10-26 | End: 2021-10-26

## 2021-10-26 RX ADMIN — ACETAMINOPHEN 975 MG: 325 TABLET ORAL at 06:26

## 2021-10-26 RX ADMIN — TRANEXAMIC ACID 1 G: 100 INJECTION, SOLUTION INTRAVENOUS at 07:55

## 2021-10-26 RX ADMIN — OXYCODONE HYDROCHLORIDE 10 MG: 10 TABLET, FILM COATED, EXTENDED RELEASE ORAL at 06:26

## 2021-10-26 RX ADMIN — MIDAZOLAM 1 MG: 1 INJECTION, SOLUTION INTRAMUSCULAR; INTRAVENOUS at 07:12

## 2021-10-26 RX ADMIN — OXYCODONE 10 MG: 5 TABLET ORAL at 13:22

## 2021-10-26 RX ADMIN — FENTANYL CITRATE 50 MCG: 50 INJECTION, SOLUTION INTRAMUSCULAR; INTRAVENOUS at 07:12

## 2021-10-26 RX ADMIN — PROPOFOL 25 MCG/KG/MIN: 10 INJECTION, EMULSION INTRAVENOUS at 07:49

## 2021-10-26 RX ADMIN — TRANEXAMIC ACID 1 G: 100 INJECTION, SOLUTION INTRAVENOUS at 09:21

## 2021-10-26 RX ADMIN — BUPIVACAINE HYDROCHLORIDE 2 ML: 5 INJECTION, SOLUTION EPIDURAL; INTRACAUDAL; PERINEURAL at 07:24

## 2021-10-26 RX ADMIN — CELECOXIB 100 MG: 100 CAPSULE ORAL at 06:26

## 2021-10-26 RX ADMIN — ROPIVACAINE HYDROCHLORIDE 30 ML: 5 INJECTION, SOLUTION EPIDURAL; INFILTRATION; PERINEURAL at 07:18

## 2021-10-26 RX ADMIN — KETOROLAC TROMETHAMINE 15 MG: 30 INJECTION, SOLUTION INTRAMUSCULAR at 13:22

## 2021-10-26 RX ADMIN — SODIUM CHLORIDE, POTASSIUM CHLORIDE, SODIUM LACTATE AND CALCIUM CHLORIDE: 600; 310; 30; 20 INJECTION, SOLUTION INTRAVENOUS at 07:43

## 2021-10-26 RX ADMIN — CEFAZOLIN SODIUM 2 G: 1 POWDER, FOR SOLUTION INTRAMUSCULAR; INTRAVENOUS at 07:50

## 2021-10-26 NOTE — INTERVAL H&P NOTE
Update History & Physical    The Patient's History and Physical of October 13, 2021 was reviewed with the patient and I examined the patient. There was no change. The surgical site was confirmed by the patient and me. Plan:  The risk, benefits, expected outcome, and alternative to the recommended procedure have been discussed with the patient. Patient understands and wants to proceed with the procedure.     Electronically signed by Yessi Lanier MD on 10/26/2021 at 6:49 AM

## 2021-10-26 NOTE — ANESTHESIA PROCEDURE NOTES
Peripheral Block    Start time: 10/26/2021 7:12 AM  End time: 10/26/2021 7:18 AM  Performed by: Jovan Malagon MD  Authorized by: Jovan Malagon MD       Pre-procedure: Indications: at surgeon's request and post-op pain management    Preanesthetic Checklist: patient identified, risks and benefits discussed, site marked, timeout performed, anesthesia consent given and patient being monitored    Timeout Time: 07:11 EDT          Block Type:   Block Type:   Adductor canal block  Laterality:  Right  Monitoring:  Continuous pulse ox, frequent vital sign checks, heart rate and responsive to questions  Injection Technique:  Single shot  Procedures: ultrasound guided    Patient Position: supine  Prep: chlorhexidine    Location:  Lower thigh  Needle Type:  Stimuplex  Needle Gauge:  21 G  Needle Localization:  Ultrasound guidance    Assessment:  Number of attempts:  1  Injection Assessment:  Incremental injection every 5 mL, local visualized surrounding nerve on ultrasound, negative aspiration for blood, no paresthesia and no intravascular symptoms  Patient tolerance:  Patient tolerated the procedure well with no immediate complications

## 2021-10-26 NOTE — ANESTHESIA PREPROCEDURE EVALUATION
Relevant Problems   No relevant active problems       Anesthetic History   No history of anesthetic complications            Review of Systems / Medical History  Patient summary reviewed, nursing notes reviewed and pertinent labs reviewed    Pulmonary  Within defined limits                 Neuro/Psych         Psychiatric history     Cardiovascular    Hypertension              Exercise tolerance: >4 METS     GI/Hepatic/Renal       Hepatitis: type C    PUD     Endo/Other  Within defined limits           Other Findings   Comments: PTSD  Bipolar d/o   Fibromyalgia    Hx gastric bypass           Physical Exam    Airway  Mallampati: II    Neck ROM: normal range of motion   Mouth opening: Normal     Cardiovascular  Regular rate and rhythm,  S1 and S2 normal,  no murmur, click, rub, or gallop  Rhythm: regular  Rate: normal         Dental    Dentition: Edentulous     Pulmonary  Breath sounds clear to auscultation               Abdominal  GI exam deferred       Other Findings            Anesthetic Plan    ASA: 3  Anesthesia type: regional and spinal - saphenous block          Induction: Intravenous  Anesthetic plan and risks discussed with: Patient

## 2021-10-26 NOTE — PROGRESS NOTES
PHYSICAL THERAPY EVALUATION/DISCHARGE  Patient: Vaibhav Marie (47 y.o. female)  Date: 10/26/2021  Primary Diagnosis: PRIMARY OA RIGHT KNEE  Procedure(s) (LRB):  RIGHT TOTAL KNEE ARTHROPLASTY (MITESH)(ANES SPINAL WITH REGIONAL BLOCK) (Right) Day of Surgery   Precautions:   WBAT, Fall      ASSESSMENT  Based on the objective data described below, the patient presents with decreased ROM and strength to RLE, decreased transfers and functional ambulation following admission for right TKA, makoplasty. Patient was seen in PACU due to being on fast track for same day discharge. Educated her and her roommate regarding bed exercises and handout provided. Donned immobilizer for ambulation and she was able to ambulate 20 feet with rolling walker and CGA, and go up and down 3 steps with one rail, one cane and CGA. Needs cues repeated for safety. No buckling noted. Roommate able to assist.  Provided them with gait belt and handouts. BP low but stable. Recommend home with HHPT and close assistance for all activity for safety, and immobilizer on with mobility. Functional Outcome Measure: The patient scored 60/100 on the Barthel outcome measure. Other factors to consider for discharge: medical history     Further skilled acute physical therapy is not indicated at this time. PLAN :  Recommendation for discharge: (in order for the patient to meet his/her long term goals)  Physical therapy at least 2 days/week in the home AND ensure assist and/or supervision for safety with gait    This discharge recommendation:  Has not yet been discussed the attending provider and/or case management    IF patient discharges home will need the following DME: patient owns DME required for discharge       SUBJECTIVE:   Patient stated I was not very nice to that nurse.     OBJECTIVE DATA SUMMARY:   HISTORY:    Past Medical History:   Diagnosis Date    Anxiety     Bipolar 2 disorder ([facility] [address] 09/2018    Blurred vision     COVID-19 vaccine series completed     Fibromyalgia     Hemorrhoids     Hepatitis C virus infection cured after antiviral drug therapy     History of bleeding peptic ulcer 2016 & 2017    History of smoking     PTSD (post-traumatic stress disorder)     Stress      Past Surgical History:   Procedure Laterality Date    HX BREAST REDUCTION  08/30/2020    HX CHOLECYSTECTOMY      HX GASTRIC BYPASS         Prior level of function: used rollator at times  Personal factors and/or comorbidities impacting plan of care: medical history    Home Situation  Home Environment: Private residence  # Steps to Enter: 0  One/Two Story Residence: Two story  # of Interior Steps: 15  Interior Rails: Left  Lift Chair Available: No  Living Alone: No  Support Systems: Other (Comment) (3 roommates)  Patient Expects to be Discharged to[de-identified] [employer]  Current DME Used/Available at Home: Grab bars, Walker, rolling, Shower chair, Cane, straight  Tub or Shower Type: Tub/Shower combination    EXAMINATION/PRESENTATION/DECISION MAKING:   Critical Behavior:  Neurologic State: Alert  Orientation Level: Oriented X4     Safety/Judgement: Good awareness of safety precautions       Skin:  Not observed    Range Of Motion:  AROM: Within functional limits (has good dorsiflexion on operative leg)              RLE AROM  R Knee Flexion: 70  R Knee Extension: 10        Strength:    Strength: Within functional limits (able to SLR on operative leg)                    Tone & Sensation:   Tone: Normal              Sensation: Intact                     Functional Mobility:  Bed Mobility:     Supine to Sit: Contact guard assistance     Scooting: Stand-by assistance  Transfers:  Sit to Stand: Assist x2;Contact guard assistance  Stand to Sit: Assist x2;Contact guard assistance                       Balance:   Sitting: Intact  Standing: Intact; With support  Ambulation/Gait Training:  Distance (ft): 20 Feet (ft)  Assistive Device: Gait belt;Walker, rolling  Ambulation - Level of Assistance: Contact guard assistance        Gait Abnormalities: Step to gait  Right Side Weight Bearing: As tolerated                                    Stairs:  Number of Stairs Trained: 3  Stairs - Level of Assistance: Contact guard assistance;Assist X2   Rail Use: Left  (cane on right side)    Therapeutic Exercises: Ankle pumps, quad and heel sets, heel slides, SLR, SAQ    Functional Measure:  Barthel Index:    Bathin  Bladder: 10  Bowels: 10  Groomin  Dressin  Feeding: 10  Mobility: 5  Stairs: 5  Toilet Use: 5  Transfer (Bed to Chair and Back): 5  Total: 60/100       The Barthel ADL Index: Guidelines  1. The index should be used as a record of what a patient does, not as a record of what a patient could do. 2. The main aim is to establish degree of independence from any help, physical or verbal, however minor and for whatever reason. 3. The need for supervision renders the patient not independent. 4. A patient's performance should be established using the best available evidence. Asking the patient, friends/relatives and nurses are the usual sources, but direct observation and common sense are also important. However direct testing is not needed. 5. Usually the patient's performance over the preceding 24-48 hours is important, but occasionally longer periods will be relevant. 6. Middle categories imply that the patient supplies over 50 per cent of the effort. 7. Use of aids to be independent is allowed. Yung Amador., Barthel, D.W. (7662). Functional evaluation: the Barthel Index. [address] [facility] (14)2. CECI Phillips, Alfred Sheama., On license of [facility] [place], [address] [place] [address] (). Measuring the change indisability after inpatient rehabilitation; comparison of the responsiveness of the Barthel Index and Functional [place] Measure. Journal of Neurology, Neurosurgery, and Psychiatry, 66(4), 828-186.   Dennys Ramos, N.J.A, MICHELLE Moore, Tony Beard, M.A. (2004.) Assessment of post-stroke quality of life in cost-effectiveness studies: The usefulness of the Barthel Index and the EuroQoL-5D. Quality of Life Research, 15, 682-49          Physical Therapy Evaluation Charge Determination   History Examination Presentation Decision-Making   MEDIUM  Complexity : 1-2 comorbidities / personal factors will impact the outcome/ POC  LOW Complexity : 1-2 Standardized tests and measures addressing body structure, function, activity limitation and / or participation in recreation  LOW Complexity : Stable, uncomplicated  Other outcome measures Barthel  LOW       Based on the above components, the patient evaluation is determined to be of the following complexity level: LOW     Pain Rating:  None at this time    Activity Tolerance:   Good      After treatment patient left in no apparent distress:   Sitting in chair and Caregiver / family present    COMMUNICATION/EDUCATION:   The patients plan of care was discussed with: Registered nurse. Fall prevention education was provided and the patient/caregiver indicated understanding. and Patient/family agree to work toward stated goals and plan of care.     Thank you for this referral.  Boyd Jackson, PT   Time Calculation: 30 mins

## 2021-10-26 NOTE — ANESTHESIA POSTPROCEDURE EVALUATION
Procedure(s):  RIGHT TOTAL KNEE ARTHROPLASTY (MITESH)(ANES SPINAL WITH REGIONAL BLOCK). regional, spinal    Anesthesia Post Evaluation      Multimodal analgesia: multimodal analgesia used between 6 hours prior to anesthesia start to PACU discharge  Patient location during evaluation: bedside  Patient participation: complete - patient participated  Level of consciousness: awake  Pain management: adequate  Airway patency: patent  Anesthetic complications: no  Cardiovascular status: acceptable  Respiratory status: acceptable  Hydration status: acceptable        INITIAL Post-op Vital signs:   Vitals Value Taken Time   /69 10/26/21 1235   Temp 36.4 °C (97.5 °F) 10/26/21 1011   Pulse 59 10/26/21 1237   Resp 16 10/26/21 1237   SpO2 97 % 10/26/21 1237   Vitals shown include unvalidated device data.

## 2021-10-26 NOTE — ANESTHESIA PROCEDURE NOTES
Spinal Block    Start time: 10/26/2021 7:19 AM  End time: 10/26/2021 7:24 AM  Performed by: Rubbie Lennox, MD  Authorized by: Rubbie Lennox, MD     Pre-procedure:   Indications: at surgeon's request and primary anesthetic  Preanesthetic Checklist: patient identified, risks and benefits discussed, anesthesia consent, site marked, patient being monitored and timeout performed    Timeout Time: 07:11 EDT          Spinal Block:   Patient Position:  Seated  Prep Region:  Lumbar  Prep: chlorhexidine      Location:  L3-4  Technique:  Single shot        Needle:   Needle Type:  Pencan  Needle Gauge:  25 G  Attempts:  1      Events: CSF confirmed, no blood with aspiration and no paresthesia        Assessment:  Insertion:  Uncomplicated  Patient tolerance:  Patient tolerated the procedure well with no immediate complications

## 2021-10-26 NOTE — DISCHARGE INSTRUCTIONS
TOTAL JOINT REPLACEMENT POST OPERATIVE INSTRUCTIONS  Follow-Up Appointment:  You should already be scheduled for a follow-up appointment with my Physician Assistant, Katherin Quiroz PA-C. If you are unsure about your follow-up date, please call our office at [phone]. If you do not have this appointment, in general it should be scheduled between 2-4 weeks from the date of your surgery    Activity:  Unless you have been specifically instructed otherwise, you can advance your activity as tolerated. You have no hip precautions. Be sure any physical therapist who is working with you understands this and encourage them to call my office with any questions. You may also bear weight fully on your hip with a walker and transition to a cane or crutch as soon as you feel comfortable and strong enough. That being said, advance your activity in a stepwise and cautious manner. You will likely feel better than your hip is ready for. Please refrain from any high level activities until we see you back at your follow up appointment. This includes golfing, exercising, and other more intense activities. Perform your exercises as often as possible, at least 2 times a day. Application of the ice packs will prevent and treat inflammation and reduce pain and swelling. You should ice the incisional area at least 3 times a day, 20-30 minutes at a time. Dressings / Wound Care: Your waterproof dressing should be removed between 7-10 days from surgery. You can do this yourself or your home therapy personnel can do it for you. Dermabond (skin glue) may be used to help close the incision, which appears as a thin, transparent covering over the incision. Do not pick or pull at this covering, this will fall off naturally within 2-3 weeks. Do not apply antibiotic ointment to your incisions. Once your waterproof dressing has been removed, you may change bandages daily if you like or leave open to air.   These can be purchased at your local pharmacy. Most incisions are closed with absorbable sutures but if not the sutures or staples will be removed at your 2 week follow up. Cezar Peel / Bathing: If your incision is dry without drainage you may shower following your discharge home. The dressing is waterproof as long as well affixed to skin. You may shower with the waterproof dressing in place, but dry dressings should be removed before showering. It is fine to have water run over the incision after the waterproof dressing has been removed. Do not vigorously scrub your incision. Do not take a bath or get into a swimming pool / Luis Specking until you follow up with Dr. Tomer Jenkins. Do not soak your incision under water for 6 weeks. If there is continued drainage or you are concerned contact Dr. Chery Lynn office prior to showering [phone] ext. [phone]    Diet:  You may advance to your regular diet as tolerated. Proper nutrition is crucial to healing. Make sure you are eating as healthy as possible and following a balanced diet after your surgery. Avoid processed foods and eat mainly fruits and vegetables. Medications: It is our goal to keep you as comfortable as possible after your surgery. Please take your medications as prescribed without exceeding the recommended dosage. It is also important to understand that pain has a cycle. It begins and increases until medication interrupts it. The aim of good pain control is to stop the pain before it becomes intolerable. The key is to stay ahead of the pain. We encourage patients to discontinue pain medications as soon as possible after surgery. The side effects of these medications can be substantial and the narcotic medications are not mandatory. You may substitute a prescribed narcotic with over-the-counter Tylenol. We along with your narcotic will likely give you two other pain medications Celebrex and tramadol.   Pain medications may cause constipation- Colace twice daily and Miralax while taking the narcotic medication should help prevent constipation. Other possible side effects of pain medication include dizziness, headache, nausea, vomiting, and urinary retention. Discontinue the pain medication if you develop itching, rash, shortness of breath, or difficulties swallowing. If these symptoms become severe or are not relieved by discontinuing the medication, you should seek immediate medical attention. Refills of pain medication are authorized during office hours only (8 AM- 5 PM  Monday through Friday). Our office will not prescribe narcotics beyond 6 weeks from the date of your surgery. Narcotics will not be called into the pharmacy and, in most cases, you must be seen clinically. Blood Thinner: You will be given a prescription for either Aspirin or Xarelto based on your health history. You should take these medications as prescribed for 30 days following your surgery and then an 81mg for 2 additional months if you dont already take one. Driving: You should not return to driving until you are off all narcotic pain medications and able to safely and quickly apply the brakes. This is normally 2-4 weeks for left sided joint replacements and 2-6 weeks for right-sided joint replacements. Airports and metal detectors  ID cards used to be routinely given after joint replacement, however they do not save you any time and arent helpful to TSA or security. Most joint replacements do not set off metal detectors. If the detector is set off, just tell the  you have a joint replacement. Signs/Symptoms of Concern: Contact Dr. Ceron Flank office if any of the following signs or symptoms develop. Please be advised if a problem arises which you feel requires immediate medical attention you should seek medical attention at the closest ER.   Temperature greater than 101.5 for more than 8 hrs  Fever and/or chills that persist for greater than 8 hr. A sudden increase in pain/swelling/ or tenderness in the back of your calf or thigh that isnt relieved with ice/elevation and pain medication. Increased drainage from your incision, increased redness or warmth at the area of your incision or a sudden increase in swelling or redness that persists despite ice and elevation    Feedback  I want to provide the very best care for you. Feel free to email me comments and suggestions on how I can improve the experience for you and future patients. I will be here with you every step of the way. It is my privilege to be your surgeon. Email: [email or web address] [employer] or Website: Josh [email or web address] SUMMARY from your Nurse      PATIENT INSTRUCTIONS    After general anesthesia or intravenous sedation, for 24 hours or while taking prescription Narcotics:  Limit your activities  Do not drive and operate hazardous machinery  Do not make important personal or business decisions  Do  not drink alcoholic beverages  If you have not urinated within 8 hours after discharge, please contact your surgeon on call. Report the following to your surgeon:  Excessive pain, swelling, redness or odor of or around the surgical area  Temperature over 100.5  Nausea and vomiting lasting longer than 4 hours or if unable to take medications  Any signs of decreased circulation or nerve impairment to extremity: change in color, persistent  numbness, tingling, coldness or increase pain  Any questions      GOOD HELP TO FIGHT AN INFECTION  Here are a few tip to help reduce the chance of getting an infection after surgery:  Wash Your Hands  Good handwashing is the most important thing you and your caregiver can do. Wash before and after caring for any wounds. Dry your hand with a clean towel. Wash with soap and water for at least 20 seconds. A TIP: sing the \"Happy Birthday\" song through one time while washing to help with the timing.   Use a hand  in between washings. Shower  When your surgeon says it is OK to take a shower, use a new bar of antibacterial soap (if that is what you use, and keep that bar of soap ONLY for your use), or antibacterial body wash. Use a clean wash cloth or sponge when you bathe. Dry off with a clean towel  after every bath - be careful around any wounds, skin staples, sutures or surgical glue over/on wounds. Do not enter swimming pools, hot tubs, lakes, rivers and/or ocean until wounds are healed and your doctor/surgeon says it is OK. Use Clean Sheets  Sleep on freshly laundered sheets after your surgery. Keep the surgery site covered with a clean, dry bandage (if instructed to do so). If the bandage becomes soiled, reapply a new, dry, clean bandage. Do not allow pets to sleep with you while your wound is healing. Lifestyle Modification and Controlling Your Blood Sugar  Smoking slows wound healing. Stop smoking and limit exposure to second-hand smoke. High blood sugar slows wound healing. Eat a well-balanced diet to provide proper nutrition while healing  Monitor your blood sugar (if you are a diabetic) and take your medications as you are suppose to so you can control you blood sugar after surgery. COUGH AND DEEP BREATHE    Breathing deeply and coughing are very important exercises to do after surgery. Deep breathing and coughing open the little air tubes and air sacks in your lungs. You take deep breaths every day. You may not even notice - it is just something you do when you sigh or yawn. It is a natural exercise you do to keep these air passages open. After surgery, take deep breaths and cough, on purpose. DIRECTIONS:  Take 10 to 15 slow deep breaths every hour while awake. Breathe in deeply, and hold it for 2 seconds. Exhale slowly through puckered lips, like blowing up a balloon.   After every 4th or 5th deep breath, hug your pillow to your chest or belly and give a hard, deep cough. Yes, it will probably hurt. But doing this exercise is a very important part of healing after surgery. Take your pain medicine to help you do this exercise without too much pain. Coughing and deep breathing help prevent bronchitis and pneumonia after surgery. If you had chest or belly surgery, use a pillow as a \"hug олег\" and hold it tightly to your chest or belly when you cough. ANKLE PUMPS    Ankle pumps increase the circulation of oxygenated blood to your lower extremities and decrease your risk for circulation problems such as blood clots. They also stretch the muscles, tendons and ligaments in your foot and ankle, and prevent joint contracture in the ankle and foot, especially after surgeries on the legs. It is important to do ankle pump exercises regularly after surgery because immobility increases your risk for developing a blood clot. Your doctor may also have you take an Aspirin for the next few days as well. If your doctor did not ask you to take an Aspirin, consult with him before starting Aspirin therapy on your own. The exercise is quite simple. Slowly point your foot forward, feeling the muscles on the top of your lower leg stretch, and hold this position for 5 seconds. Next, pull your foot back toward you as far as possible, stretching the calf muscles, and hold that position for 5 seconds. Repeat with the other foot. Perform 10 repetitions every hour while awake for both ankles if possible (down and then up with the foot once is one repetition). You should feel gentle stretching of the muscles in your lower leg when doing this exercise. If you feel pain, or your range of motion is limited, don't push too hard. Only go the limit your joint and muscles will let you go. If you have increasing pain, progressively worsening leg warmth or swelling, STOP the exercise and call your doctor.            MEDICATION AND   SIDE EFFECT GUIDE    The [facility] MEDICATION AND SIDE EFFECT GUIDE was provided to the PATIENT AND CARE PROVIDER. Information provided includes instruction about drug purpose and common side effects for the following medications:   ***        These are general instructions for a healthy lifestyle:    *   Please give a list of your current medications to your Primary Care Provider. *   Please update this list whenever your medications are discontinued, doses are changed, or new medications (including over-the-counter products) are added. *   Please carry medication information at all times in case of emergency situations. About Smoking  No smoking / No tobacco products  Avoid exposure to second hand smoke     Surgeon General's Warning:  Quitting smoking now greatly reduces serious risk to your health. Obesity, smoking, and sedentary lifestyle greatly increases your risk for illness and disease. A healthy diet, regular physical exercise & weight monitoring are important for maintaining a healthy lifestyle. Congestive Heart Failure  You may be retaining fluid if you have a history of heart failure or if you experience any of the following symptoms:  Weight gain of 3 pounds or more overnight or 5 pounds in a week, increased swelling in your hands or feet or shortness of breath while lying flat in bed. Please call your doctor as soon as you notice any of these symptoms; do not wait until your next office visit. Recognize signs and symptoms of STROKE:  F -  Face looks uneven  A -  Arms unable to move or move evenly  S -  Speech slurred or non-existent  T -  Time-call 911 as soon as signs and symptoms begin-DO NOT go          back to bed or wait to see if you get better-TIME IS BRAIN. Warning Signs of HEART ATTACK   Call 911 if you have these symptoms:    Chest discomfort.  Most heart attacks involve discomfort in the center of the chest that lasts more than a few minutes, or that goes away and comes back. It can feel like uncomfortable pressure, squeezing, fullness, or pain. Discomfort in other areas of the upper body. Symptoms can include pain or discomfort in one or both arms, the back, neck, jaw, or stomach. Shortness of breath with or without chest discomfort. Other signs may include breaking out in a cold sweat, nausea, or lightheadedness. Don't wait more than five minutes to call 911 - MINUTES MATTER! Fast action can save your life. Calling 911 is almost always the fastest way to get lifesaving treatment. Emergency Medical Services staff can begin treatment when they arrive -- up to an hour sooner than if someone gets to the hospital by car. Learning About Coronavirus [phone])  Coronavirus [phone]): Overview  What is coronavirus (COVID-19)? The coronavirus disease (COVID-19) is caused by a virus. It is an illness that was first found in [place], [place], in December 2019. It has since spread worldwide. The virus can cause fever, cough, and trouble breathing. In severe cases, it can cause pneumonia and make it hard to breathe without help. It can cause death. Coronaviruses are a large group of viruses. They cause the common cold. They also cause more serious illnesses like Middle East respiratory syndrome (MERS) and severe acute respiratory syndrome (SARS). COVID-19 is caused by a novel coronavirus. That means it's a new type that has not been seen in people before. This virus spreads person-to-person through droplets from coughing and sneezing. It can also spread when you are close to someone who is infected. And it can spread when you touch something that has the virus on it, such as a doorknob or a tabletop. What can you do to protect yourself from coronavirus (COVID-19)? The best way to protect yourself from getting sick is to: Avoid areas where there is an outbreak. Avoid contact with people who may be infected.   Wash your hands often with soap or alcohol-based hand sanitizers. Avoid crowds and try to stay at least 6 feet away from other people. Wash your hands often, especially after you cough or sneeze. Use soap and water, and scrub for at least 20 seconds. If soap and water aren't available, use an alcohol-based hand . Avoid touching your mouth, nose, and eyes. What can you do to avoid spreading the virus to others? To help avoid spreading the virus to others:  Cover your mouth with a tissue when you cough or sneeze. Then throw the tissue in the trash. Use a disinfectant to clean things that you touch often. Stay home if you are sick or have been exposed to the virus. Don't go to school, work, or public areas. And don't use public transportation. If you are sick:  Leave your home only if you need to get medical care. But call the doctor's office first so they know you're coming. And wear a face mask, if you have one. If you have a face mask, wear it whenever you're around other people. It can help stop the spread of the virus when you cough or sneeze. Clean and disinfect your home every day. Use household  and disinfectant wipes or sprays. Take special care to clean things that you grab with your hands. These include doorknobs, remote controls, phones, and handles on your refrigerator and microwave. And don't forget countertops, tabletops, bathrooms, and computer keyboards. When to call for help  Call 911 anytime you think you may need emergency care. For example, call if:  You have severe trouble breathing. (You can't talk at all.)  You have constant chest pain or pressure. You are severely dizzy or lightheaded. You are confused or can't think clearly. Your face and lips have a blue color. You pass out (lose consciousness) or are very hard to wake up. Call your doctor now if you develop symptoms such as:  Shortness of breath. Fever. Cough. If you need to get care, call ahead to the doctor's office for instructions before you go.  Make sure you wear a face mask, if you have one, to prevent exposing other people to the virus. Where can you get the latest information? The following health organizations are tracking and studying this virus. Their websites contain the most up-to-date information. Smiley Lanier also learn what to do if you think you may have been exposed to the virus. U.S. Centers for Disease Control and Prevention (CDC): The CDC provides updated news about the disease and travel advice. The website also tells you how to prevent the spread of infection. www.cdc.gov  World Health Organization [facility]): WHO offers information about the virus outbreaks. WHO also has travel advice. www.who.int  Current as of: April 1, 2020               Content Version: 12.4  © 2006-2020 [employer]. Care instructions adapted under license by your healthcare professional. If you have questions about a medical condition or this instruction, always ask your healthcare professional. [address] any warranty or liability for your use of this information. The discharge information has been reviewed with the {PATIENT PARENT GUARDIAN:97094}. Any questions and concerns from the {PATIENT PARENT GUARDIAN:33988} have been addressed. The {PATIENT PARENT GUARDIAN:47735} verbalized understanding.         CONTENTS FOUND IN YOUR DISCHARGE ENVELOPE:  [x]     Surgeon and Hospital Discharge Instructions  [x]     [facility] Care Provider Card  []     Medication & Side Effect Guide            (your newly prescribed medications have been marked/highlighted showing the most common side effects from   the classes of drugs on your prescriptions)  []     Medication Prescription(s) x *** ( [] These have been sent electronically to your pharmacy by your surgeon,   - OR -       your surgeon has already provided these to you during a previous/pre-op office visit)  []     [address]  []     Physical Therapy Prescription  [] Follow-up Appointment Cards  []     Surgery-related Pictures/Media  []     Pain block and/or block with On-Q Catheter from Anesthesia Service (information included in your instructions above)  []     Medical device information sheets/pamphlets from their    []     School/work excuse note. []     /parent work excuse note. The following personal items collected during your admission are returned to you:   Dental Appliance: Dental Appliances: Uppers, Lowers, Other (comment) (patient states she left them in the car)  Vision: Visual Aid: Glasses  Hearing Aid:    Jewelry: Jewelry: None  Clothing: Clothing: Footwear, Undergarments, Sweater, Pants, Shirt  Other Valuables: Other Valuables:  Other (comment) (rolling suitcase)  Valuables sent to safe:

## 2021-10-26 NOTE — DISCHARGE SUMMARY
[facility]       DISCHARGE SUMMARY     Patient: [address] Record Number: [id number]                : 1950  Age: 73 y.o. Admit Date: 10/26/2021  Discharge Date: 10/26/2021    Admission Diagnosis: PRIMARY OA RIGHT KNEE  Discharge Diagnosis: PRIMARY OA RIGHT KNEE    Procedures: Procedure(s):  RIGHT TOTAL KNEE ARTHROPLASTY (MITESH)(ANES SPINAL WITH REGIONAL BLOCK)    Surgeon: Steve Siegel. Yenny Gutierrez MD    Anesthesia: choice  Complications: None     History of Present Illness:  Sebastian Parks is a 73 y.o. female with a history of Right knee pain, swelling, and marked loss of function. Despite conservative management and after clinical and radiographic evaluation, it was determined that she suffered from end-stage osteoarthritis and would benefit from Procedure(s):  RIGHT TOTAL KNEE ARTHROPLASTY (MITESH)(ANES SPINAL WITH REGIONAL BLOCK), which she consented to undergo after a discussion of the risks, benefits, alternatives, rehab concerns, and potential complications of surgery. Hospital Course:  Sebastian Parks tolerated the procedure well. She was transferred  to the recovery room in stable condition. After a brief stay the patient was then transferred to the Joint Replacement Unit at [facility]. They received prophylactic intravenous antibiotics. DVT prophylaxis included SCDs, early ambulation, and ASA was started the evening of surgery. The patient was able to tolerate a regular diet and their pain was reasonably well controlled. The patient progressed well throughout the hospitalization and was deemed stable for discharge on above listed date. Disposition:  Home    Discharge Medications:  Current Discharge Medication List        START taking these medications    Details   aspirin 81 mg chewable tablet Take 1 Tablet by mouth two (2) times a day for 30 days.   Qty: 60 Tablet, Refills: 0      celecoxib (CELEBREX) 200 mg capsule Take 1 Capsule by mouth two (2) times a day for 90 days. Qty: 60 Capsule, Refills: 2      ondansetron (ZOFRAN ODT) 4 mg disintegrating tablet Take 1 Tablet by mouth every eight (8) hours as needed for Nausea or Vomiting for up to 21 doses. Qty: 21 Tablet, Refills: 0      oxyCODONE IR (ROXICODONE) 5 mg immediate release tablet Take 1 Tablet by mouth every four (4) hours as needed for Pain for up to 10 days. Max Daily Amount: 30 mg.  Qty: 42 Tablet, Refills: 0    Associated Diagnoses: S/P TKR (total knee replacement) using cement, right      polyethylene glycol (MIRALAX) 17 gram packet Take 1 Packet by mouth daily for 14 days. Qty: 14 Packet, Refills: 0      traMADoL (ULTRAM) 50 mg tablet Take 1 Tablet by mouth every six (6) hours as needed for Pain for up to 10 days. Max Daily Amount: 200 mg. Qty: 30 Tablet, Refills: 0    Associated Diagnoses: S/P TKR (total knee replacement) using cement, right      !! senna-docusate (PERICOLACE) 8.6-50 mg per tablet Take 1 Tablet by mouth daily for 14 days. Qty: 14 Tablet, Refills: 0       !! - Potential duplicate medications found. Please discuss with provider. CONTINUE these medications which have NOT CHANGED    Details   amitriptyline (ELAVIL) 25 mg tablet Take 25 mg by mouth nightly. amLODIPine (NORVASC) 10 mg tablet Take 10 mg by mouth daily. metoprolol succinate (TOPROL-XL) 100 mg tablet TAKE 1 TABLET BY MOUTH DAILY  Qty: 90 Tablet, Refills: 3      !! senna-docusate (PERICOLACE) 8.6-50 mg per tablet Take 1 Tablet by mouth two (2) times a day. Qty: 180 Tablet, Refills: 4    Associated Diagnoses: Drug-induced constipation      gabapentin (NEURONTIN) 800 mg tablet Take 1 Tablet by mouth four (4) times daily. Max Daily Amount: 3,200 mg. Qty: 360 Tablet, Refills: 1    Associated Diagnoses: Fibromyalgia; Neuropathy      ferrous sulfate (SLOW FE) 142 mg (45 mg iron) ER tablet Take 1 Tablet by mouth Daily (before breakfast).   Qty: 90 Tablet, Refills: 4    Associated Diagnoses: Other specified intestinal malabsorption      multivitamin (ONE A DAY) tablet Take 2 Tablets by mouth daily. Qty: 180 Tablet, Refills: 4    Associated Diagnoses: Other specified intestinal malabsorption      cyanocobalamin, vitamin B-12, (Vitamin B-12) 5,000 mcg subl 1 Tablet by SubLINGual route daily. Qty: 90 Tablet, Refills: 4    Associated Diagnoses: Other specified intestinal malabsorption      tiZANidine (ZANAFLEX) 2 mg tablet Take 1 Tablet by mouth four (4) times daily as needed for Muscle Spasm(s) or Pain. Qty: 120 Tablet, Refills: 1    Associated Diagnoses: Fibromyalgia      senna leaf tea Take  by mouth. !! - Potential duplicate medications found. Please discuss with provider. STOP taking these medications       nicotine (Nicoderm CQ) 21 mg/24 hr Comments:   Reason for Stopping:                 My Medications        START taking these medications        Instructions Each Dose to Equal Morning Noon Evening Bedtime   aspirin 81 mg chewable tablet    Your last dose was: Your next dose is: Take 1 Tablet by mouth two (2) times a day for 30 days. 81 mg                 celecoxib 200 mg capsule  Commonly known as: CELEBREX    Your last dose was: Your next dose is: Take 1 Capsule by mouth two (2) times a day for 90 days. 200 mg                 ondansetron 4 mg disintegrating tablet  Commonly known as: ZOFRAN ODT    Your last dose was: Your next dose is: Take 1 Tablet by mouth every eight (8) hours as needed for Nausea or Vomiting for up to 21 doses. 4 mg                 oxyCODONE IR 5 mg immediate release tablet  Commonly known as: ROXICODONE    Your last dose was: Your next dose is: Take 1 Tablet by mouth every four (4) hours as needed for Pain for up to 10 days. Max Daily Amount: 30 mg.   5 mg                 polyethylene glycol 17 gram packet  Commonly known as: MIRALAX    Your last dose was:      Your next dose is: Take 1 Packet by mouth daily for 14 days. 17 g                 traMADoL 50 mg tablet  Commonly known as: ULTRAM    Your last dose was: Your next dose is: Take 1 Tablet by mouth every six (6) hours as needed for Pain for up to 10 days. Max Daily Amount: 200 mg.   50 mg                        CONTINUE taking these medications        Instructions Each Dose to Equal Morning Noon Evening Bedtime   amitriptyline 25 mg tablet  Commonly known as: ELAVIL    Your last dose was: Your next dose is: Take 25 mg by mouth nightly. 25 mg                 amLODIPine 10 mg tablet  Commonly known as: NORVASC    Your last dose was: Your next dose is: Take 10 mg by mouth daily. 10 mg                 cyanocobalamin (vitamin B-12) 5,000 mcg Subl  Commonly known as: Vitamin B-12    Your last dose was: Your next dose is:         1 Tablet by SubLINGual route daily. 1 Tablet                 gabapentin 800 mg tablet  Commonly known as: NEURONTIN    Your last dose was: Your next dose is: Take 1 Tablet by mouth four (4) times daily. Max Daily Amount: 3,200 mg.   800 mg                 metoprolol succinate 100 mg tablet  Commonly known as: TOPROL-XL    Your last dose was: Your next dose is:         TAKE 1 TABLET BY MOUTH DAILY                  multivitamin tablet  Commonly known as: ONE A DAY    Your last dose was: Your next dose is: Take 2 Tablets by mouth daily. 2 Tablet                 tiZANidine 2 mg tablet  Commonly known as: Jaclyn Beecham    Your last dose was: Your next dose is: Take 1 Tablet by mouth four (4) times daily as needed for Muscle Spasm(s) or Pain.    2 mg                        STOP taking these medications      ferrous sulfate 142 mg (45 mg iron) ER tablet  Commonly known as: SLOW FE        Nicoderm CQ 21 mg/24 hr  Generic drug: nicotine                  Where to Get Your Medications        These medications were sent to [facility] [address], [address] [place] [address],  [address] [place] [address]      Phone: [phone]   aspirin 81 mg chewable tablet  celecoxib 200 mg capsule  ondansetron 4 mg disintegrating tablet  oxyCODONE IR 5 mg immediate release tablet  polyethylene glycol 17 gram packet  traMADoL 50 mg tablet           Patient Instructions: The patient will notify me for any increased bleeding, drainage, or progressively worsening pain, or other concerning symptoms. They have been instructed to report to the emergency room immediately for any chest pain or shortness of breath. Activity: WBAT on operative leg    DVT prophylaxis: ASA 81mg PO BIDx 30 days    Wound Care: The patient has a waterproof dressing in place. They can shower with the dressing in place. They will remove the dressing in 7-10 days. At that time, they can get the incision wet in the shower, and will pat dry afterwards. Follow-up visit at [address] [place] [address] for 2 week post-op. Signed:  Ed Arvizu PA-C  11/3/2021      Kristy Evans [place], [place]  Supervising Physician: Dr. Barrie Garcia.  [address] [place] [phone]  Medical Staff : [facility]  Office : [phone]

## 2021-10-26 NOTE — PERIOP NOTES
Pt is easily agitated when speaking to her. She is irritated that she has to start with ice chips and lay flat due to low BP related to spinal anesthesia. Pt states, \"she wants water and lots of it, and she wants her protein shake now\"    Pt is informed she should wait due to risk of nausea after surgery. Pt continues to be irritated, but seems to calm with reading her book. Will continue to monitor.

## 2021-10-26 NOTE — OP NOTES
OPERATIVE REPORT     Admit Date: 10/26/2021  Admit Diagnosis: PRIMARY OA RIGHT KNEE    Date of Procedure: 10/26/2021   Preoperative Diagnosis: PRIMARY OA RIGHT KNEE  Postoperative Diagnosis: PRIMARY OA RIGHT KNEE    Procedure: Procedure(s):  RIGHT TOTAL KNEE ARTHROPLASTY (MITESH)(ANES SPINAL WITH REGIONAL BLOCK)  Surgeon: Devaughn Varela MD  Assistant(s): LADI Bowen  Anesthesia: Regional   Antibiotic: ANCEF 2 g  Estimated Blood Loss:150  Tourniquet Time: 72 min @ 250 mmHg   Specimens: * No specimens in log *   Complications: None          Implants Utilized:  [place] Triathlon Cruciate Retaining Femoral Component, size #4 CR RT, [place] Triathlon Universal Tibial Baseplate, size #4, [place] Triathlon X3 Tibial Bearing Insert - CS, size #4x11mm, [place] Triathlon X3 Asymmetric Patella size [id number]. INDICATIONS:   The patient is a 73 y.o., female who has complained of a long history of  right knee pain. The patient  has failed conservative treatment and presents for definitive operative care. Informed consent obtained including a discussion of the risks and benefits, which include, but are not limited to, bleeding, infection, neurovascular damage, wound complications, pain and stiffness in the knee, incomplete relief of symptoms or dissatisfaction with surgery, periprosthetic loosening, fracture, dislocation and DVT, the patient consented for the procedure. DESCRIPTION OF PROCEDURE:           The patient was seen in the preoperative holding area. The patient was positively identified. The limb was initialed,  questions were answered. The patient was subsequently taken to the operating room. The patient underwent spinal anesthesia. The patient was positioned in the supine position. All bony prominences were well padded. The limb was prepped and draped in a sterile fashion. The appropriate pause for safety was performed.      After adequate anesthesia, the correct lower extremity was prepped and draped in sterile fashion. The patient was positioned supine on the operating room table. Once positioned a formal time-out was performed that identified the operative extremity. Before proceeding with surgery we ensured all implants and instruments were available and preoperative imaging/templating was available and in the room which confirmed the operative extremity. A mid-line parapatellar incision was used along with medial arthrotomy. Soft tissue were removed from the patellar fat pad and notch. The MCL was mobilized and the tibia subluxed. Patellar preparation was completed to include removal of the fat pad, patellar cut and protector placed. Next we proceeded with placement of our femoral and tibial arrays. The femoral array was placed outside of our incision 1 handbreadth proximal to the patella. Poke hole incisions were made through the skin and blunt dissection performed down to the level of the femoral shaft. The tibial array was placed in a similar fashion 1 handbreadth below the joint line with poke hole incisions just off the crest of the tibia. Arrays were confirmed to be in appropriate position and tightened. The medial-based soft tissue was then retracted as well as well as the lateral tissue. The femoral and tibial trackers were placed. The hip center or rotation was established. Registration was performed on the femur and tibia. Osteophytes were removed. The knee was balanced in both flexion and extension with force applied. The computer model of implants and position was verified. Once this was complete the MAKOplasty robot was positioned. Standard cuts were made for the distal femur and posterior chamfer first. The blade was changed and then the remaining femoral and then tibial cuts were made. We then removed our bony resections and excess bone.  Then using a lamina  we removed any posterior osteophytes from the medial and lateral recess along with any residual soft tissue or meniscus. Trial implants were placed and the patella drill holes were drilled. Final trialing was performed with stable ligamentous exam, full extension / flexion and normal patellar tracking. Lug holes were then drilled for the femoral implant. Components were removed and the bone was copiously irrigated. Cement was mixed using Simplex cement and applied to the bone surfaces and components in the following order : Tibia, Femur, Patella. Excess cement was removed. The incision was irrigated and all excess debris removed from the knee. Knee was then soaked with dilute betadine solution. The knee was kept in full extension as the cement hardened. Once hard the trial poly was removed and any residual cement was removed from the knee and then the final poly was inserted after confirming acceptable fit and balance. The knee was very stable and it was taken through a full range of motion. The capsulotomy was closed with interrupted #1 Vicryls and #2 Stratafix suture in a running fashion after placing vancomycin powder in the wound and obtaining hemostasis. The sub-cutaneous tissue was closed with 2-0 Vicryl in subcutaneous tissue and running 4-0 monocryl Stratafix in the dermis with adhesive glue on the skin. A sterile dressing was applied. The patient was awoken from anesthesia and taken to the recovery room in a stable condiition. JUSTIFICATION FOR SURGICAL ASSISTANT:   Surgical Assistant, Reentta Cole PA-C, was requried and necessary in this case, to help with soft tissue retraction, extremity positioning, equiment management, implant management, and wound closure.       Cindy Van MD

## 2021-10-28 ENCOUNTER — TELEPHONE (OUTPATIENT)
Dept: SURGERY | Age: 71
End: 2021-10-28

## 2021-10-28 DIAGNOSIS — K90.89 OTHER SPECIFIED INTESTINAL MALABSORPTION: ICD-10-CM

## 2021-10-28 NOTE — TELEPHONE ENCOUNTER
Post Discharge Phone placed to patient after Joint Replacement surgery   Spoke with patient    Patient is taking narcotics, her daughter is managing her medicines  States pain is tolerable and pain medication is effective.    Patient was able to fill prescriptions, no medication questions    States discharge instructions were clear and easy to understand has no questions  Patient is using a walker without difficulty, moving every hour  Able to do exercises regularly  Bruising is none  Swelling is moderate  Patient is elevating leg and using ice with good relief  States dressing is intact without drainage  Denies N/V, appetite is good  Constipation is none, +BM  Follow up appointment is scheduled with surgeon   PT is scheduled   Denies fever, cough, chest pain, SOB  Denies any unusual symptoms  Discussed calling surgeon or PCP for concerns  Opportunity given for patient to ask questions  She said she is too doped up to talk anymore and her daughter left the room  Call duration 4:37 minutes

## 2021-11-02 RX ORDER — AMOXICILLIN 250 MG
CAPSULE ORAL
Qty: 180 TABLET | Refills: 1 | Status: SHIPPED | OUTPATIENT
Start: 2021-11-02 | End: 2022-03-29 | Stop reason: SDUPTHER

## 2021-11-18 RX ORDER — METOPROLOL SUCCINATE 100 MG/1
100 TABLET, EXTENDED RELEASE ORAL DAILY
Qty: 90 TABLET | Refills: 3 | Status: SHIPPED | COMMUNITY
Start: 2021-11-18 | End: 2022-02-03 | Stop reason: SDUPTHER

## 2021-11-18 NOTE — TELEPHONE ENCOUNTER
----- Message from Lazarus Jameson sent at 11/18/2021  9:36 AM EST -----  Subject: Refill Request    QUESTIONS  Name of Medication? metoprolol succinate (TOPROL-XL) 100 mg tablet  Patient-reported dosage and instructions? once in morning  How many days do you have left? 25  Preferred Pharmacy? [facility] phone number (if available)? [phone]  Additional Information for Provider? pt also req amlodipine besylate 10mg   once daily with metoprolol once daily. Pt req 90 day supply on all   medications  ---------------------------------------------------------------------------  --------------,  Name of Medication? gabapentin (NEURONTIN) 800 mg tablet  Patient-reported dosage and instructions? 4 times daily daily  How many days do you have left? 0  Preferred Pharmacy? [facility] phone number (if available)? [phone]  ---------------------------------------------------------------------------  --------------  CALL BACK INFO  What is the best way for the office to contact you? OK to leave message on   voicemail  Preferred Call Back Phone Number?  [phone]

## 2021-11-19 DIAGNOSIS — M79.7 FIBROMYALGIA: ICD-10-CM

## 2021-11-19 DIAGNOSIS — G62.9 NEUROPATHY: ICD-10-CM

## 2021-11-19 NOTE — TELEPHONE ENCOUNTER
----- Message from Bryan Patel sent at 11/19/2021 11:44 AM EST -----  Subject: Refill Request    QUESTIONS  Name of Medication? gabapentin (NEURONTIN) 800 mg tablet  Patient-reported dosage and instructions? 800 MG tablet by mouth four   times per day  How many days do you have left? 0  Preferred Pharmacy? [facility] phone number (if available)? [phone]  Additional Information for Provider? Patient has three months supply left   of the Gabapentin 400 MG, patient states this was a mistake, she is   suppose to have 800 MG.  ---------------------------------------------------------------------------  --------------  CALL BACK INFO  What is the best way for the office to contact you? OK to leave message on   voicemail  Preferred Call Back Phone Number?  [phone]

## 2021-11-20 RX ORDER — GABAPENTIN 800 MG/1
800 TABLET ORAL 4 TIMES DAILY
Qty: 360 TABLET | Refills: 3 | Status: SHIPPED | OUTPATIENT
Start: 2021-11-20 | End: 2022-04-05

## 2021-12-08 RX ORDER — AMLODIPINE BESYLATE 10 MG/1
TABLET ORAL
Qty: 90 TABLET | Refills: 3 | Status: SHIPPED | OUTPATIENT
Start: 2021-12-08 | End: 2022-02-03 | Stop reason: SDUPTHER

## 2022-01-14 ENCOUNTER — HOSPITAL ENCOUNTER (OUTPATIENT)
Dept: MAMMOGRAPHY | Age: 72
Discharge: HOME | End: 2022-01-14
Attending: FAMILY MEDICINE
Payer: MEDICARE

## 2022-01-14 DIAGNOSIS — Z12.31 ENCOUNTER FOR SCREENING MAMMOGRAM FOR MALIGNANT NEOPLASM OF BREAST: ICD-10-CM

## 2022-01-14 PROCEDURE — 77063 BREAST TOMOSYNTHESIS BI: CPT

## 2022-02-03 ENCOUNTER — VIRTUAL VISIT (OUTPATIENT)
Dept: FAMILY MEDICINE CLINIC | Age: 72
End: 2022-02-03
Payer: MEDICARE

## 2022-02-03 DIAGNOSIS — K90.89 OTHER SPECIFIED INTESTINAL MALABSORPTION: ICD-10-CM

## 2022-02-03 DIAGNOSIS — M79.7 FIBROMYALGIA: ICD-10-CM

## 2022-02-03 DIAGNOSIS — I10 ESSENTIAL HYPERTENSION: Primary | ICD-10-CM

## 2022-02-03 PROBLEM — F39 MOOD DISORDER (HCC): Status: RESOLVED | Noted: 2019-06-26 | Resolved: 2022-02-03

## 2022-02-03 PROCEDURE — 1101F PT FALLS ASSESS-DOCD LE1/YR: CPT | Performed by: FAMILY MEDICINE

## 2022-02-03 PROCEDURE — G8417 CALC BMI ABV UP PARAM F/U: HCPCS | Performed by: FAMILY MEDICINE

## 2022-02-03 PROCEDURE — 99214 OFFICE O/P EST MOD 30 MIN: CPT | Performed by: FAMILY MEDICINE

## 2022-02-03 PROCEDURE — G8510 SCR DEP NEG, NO PLAN REQD: HCPCS | Performed by: FAMILY MEDICINE

## 2022-02-03 PROCEDURE — G9899 SCRN MAM PERF RSLTS DOC: HCPCS | Performed by: FAMILY MEDICINE

## 2022-02-03 PROCEDURE — G8536 NO DOC ELDER MAL SCRN: HCPCS | Performed by: FAMILY MEDICINE

## 2022-02-03 PROCEDURE — G8399 PT W/DXA RESULTS DOCUMENT: HCPCS | Performed by: FAMILY MEDICINE

## 2022-02-03 PROCEDURE — 3017F COLORECTAL CA SCREEN DOC REV: CPT | Performed by: FAMILY MEDICINE

## 2022-02-03 PROCEDURE — G8427 DOCREV CUR MEDS BY ELIG CLIN: HCPCS | Performed by: FAMILY MEDICINE

## 2022-02-03 PROCEDURE — G8756 NO BP MEASURE DOC: HCPCS | Performed by: FAMILY MEDICINE

## 2022-02-03 PROCEDURE — 1090F PRES/ABSN URINE INCON ASSESS: CPT | Performed by: FAMILY MEDICINE

## 2022-02-03 RX ORDER — AMITRIPTYLINE HYDROCHLORIDE 10 MG/1
10 TABLET, FILM COATED ORAL
Qty: 90 TABLET | Refills: 4 | Status: SHIPPED | COMMUNITY
Start: 2022-02-03 | End: 2022-02-10

## 2022-02-03 RX ORDER — METOPROLOL SUCCINATE 200 MG/1
200 TABLET, EXTENDED RELEASE ORAL DAILY
Qty: 90 TABLET | Refills: 0 | Status: SHIPPED | COMMUNITY
Start: 2022-02-03 | End: 2022-02-10 | Stop reason: SDUPTHER

## 2022-02-03 RX ORDER — DIVALPROEX SODIUM 250 MG/1
250 TABLET, DELAYED RELEASE ORAL 3 TIMES DAILY
COMMUNITY
Start: 2021-11-13

## 2022-02-03 RX ORDER — BISMUTH SUBSALICYLATE 262 MG
2 TABLET,CHEWABLE ORAL DAILY
Qty: 180 TABLET | Refills: 4 | Status: SHIPPED | OUTPATIENT
Start: 2022-02-03 | End: 2022-06-15 | Stop reason: SDUPTHER

## 2022-02-03 RX ORDER — AMLODIPINE BESYLATE 10 MG/1
10 TABLET ORAL DAILY
Qty: 90 TABLET | Refills: 4 | Status: SHIPPED | COMMUNITY
Start: 2022-02-03 | End: 2022-03-25 | Stop reason: SDUPTHER

## 2022-02-03 RX ORDER — DIVALPROEX SODIUM 250 MG/1
250 TABLET, DELAYED RELEASE ORAL DAILY
Qty: 90 TABLET | Refills: 0 | Status: CANCELLED | COMMUNITY
Start: 2022-02-03

## 2022-02-03 RX ORDER — QUETIAPINE FUMARATE 25 MG/1
25 TABLET, FILM COATED ORAL DAILY
COMMUNITY
End: 2022-06-15

## 2022-02-03 RX ORDER — QUETIAPINE FUMARATE 25 MG/1
25 TABLET, FILM COATED ORAL DAILY
Qty: 90 TABLET | Refills: 0 | Status: CANCELLED | COMMUNITY
Start: 2022-02-03

## 2022-02-03 RX ORDER — ACETAMINOPHEN, DIPHENHYDRAMINE HCL, PHENYLEPHRINE HCL 325; 25; 5 MG/1; MG/1; MG/1
1 TABLET ORAL DAILY
Qty: 90 TABLET | Refills: 4 | Status: CANCELLED | COMMUNITY
Start: 2022-02-03

## 2022-02-03 RX ORDER — GABAPENTIN 800 MG/1
800 TABLET ORAL 4 TIMES DAILY
Qty: 360 TABLET | Refills: 3 | Status: CANCELLED | COMMUNITY
Start: 2022-02-03

## 2022-02-03 RX ORDER — ACETAMINOPHEN AND CODEINE PHOSPHATE 300; 30 MG/1; MG/1
1 TABLET ORAL
COMMUNITY
Start: 2021-12-13 | End: 2022-03-25

## 2022-02-03 NOTE — PROGRESS NOTES
Chief Complaint   Patient presents with    Hypertension     Follow up. 1. Have you been to the ER, urgent care clinic since your last visit? Hospitalized since your last visit? No    2. Have you seen or consulted any other health care providers outside of the [address] [place] [address] since your last visit? Include any pap smears or colon screening.  No

## 2022-02-03 NOTE — PROGRESS NOTES
Zohaib Morrow is a 73 y.o. female who was seen by synchronous (real-time) audio-video technology on 2/3/2022. Assessment & Plan:   Diagnoses and all orders for this visit:    1. Essential hypertension  -     amLODIPine (NORVASC) 10 mg tablet; Take 1 Tablet by mouth daily. -     metoprolol succinate (TOPROL-XL) 200 mg XL tablet; Take 1 Tablet by mouth daily. 2. Fibromyalgia  -     amitriptyline (ELAVIL) 10 mg tablet; Take 1 Tablet by mouth nightly. 3. Other specified intestinal malabsorption  -     ferrous sulfate (SLOW FE) 142 mg (45 mg iron) ER tablet; Take 1 Tablet by mouth Before breakfast and dinner.  -     multivitamin (ONE A DAY) tablet; Take 2 Tablets by mouth daily. Blood pressure elevated  Increase Toprol XL  Refills per orders    Follow-up and Dispositions    · Return in about 6 weeks (around 3/17/2022) for blood pressure. Reviewed plan of care. Patient has provided input and agrees with goals. CPT Codes 24010-08110 for Established Patients may apply to this Telehealth Visit      Subjective:   Zohaib Morrow was seen for Hypertension (Follow up.)      Patient presents with:  Hypertension: Follow up. Compliant with meds. She is switching pharmacies and needs many of her meds refilled. Review of Systems   Eyes: Negative for blurred vision. Respiratory: Negative for shortness of breath. Cardiovascular: Negative for chest pain. Neurological: Negative for dizziness, sensory change, speech change, focal weakness and headaches. Objective:   /99, P 97    Physical Exam  Constitutional:       General: She is not in acute distress. Appearance: Normal appearance. Neurological:      Mental Status: She is alert and oriented to person, place, and time. Due to this being a TeleHealth evaluation, many elements of the physical examination are unable to be assessed.          We discussed the expected course, resolution and complications of the diagnosis(es) in detail. Medication risks, benefits, costs, interactions, and alternatives were discussed as indicated. I advised her to contact the office if her condition worsens, changes or fails to improve as anticipated. She expressed understanding with the diagnosis(es) and plan. Pursuant to the emergency declaration under the [address] [place] [address] waiver authority and the [employer] Act, this Virtual  Visit was conducted, with patient's consent, to reduce the patient's risk of exposure to COVID-19 and provide continuity of care for an established patient. Services were provided through a video synchronous discussion virtually to substitute for in-person clinic visit.     Dariel Anaya MD

## 2022-02-10 ENCOUNTER — VIRTUAL VISIT (OUTPATIENT)
Dept: FAMILY MEDICINE CLINIC | Age: 72
End: 2022-02-10
Payer: MEDICARE

## 2022-02-10 DIAGNOSIS — L40.9 PSORIASIS: ICD-10-CM

## 2022-02-10 DIAGNOSIS — I10 ESSENTIAL HYPERTENSION: Primary | ICD-10-CM

## 2022-02-10 PROCEDURE — 99214 OFFICE O/P EST MOD 30 MIN: CPT | Performed by: FAMILY MEDICINE

## 2022-02-10 PROCEDURE — G8756 NO BP MEASURE DOC: HCPCS | Performed by: FAMILY MEDICINE

## 2022-02-10 PROCEDURE — G8536 NO DOC ELDER MAL SCRN: HCPCS | Performed by: FAMILY MEDICINE

## 2022-02-10 PROCEDURE — G8427 DOCREV CUR MEDS BY ELIG CLIN: HCPCS | Performed by: FAMILY MEDICINE

## 2022-02-10 PROCEDURE — 1090F PRES/ABSN URINE INCON ASSESS: CPT | Performed by: FAMILY MEDICINE

## 2022-02-10 PROCEDURE — G9899 SCRN MAM PERF RSLTS DOC: HCPCS | Performed by: FAMILY MEDICINE

## 2022-02-10 PROCEDURE — G8417 CALC BMI ABV UP PARAM F/U: HCPCS | Performed by: FAMILY MEDICINE

## 2022-02-10 PROCEDURE — 1101F PT FALLS ASSESS-DOCD LE1/YR: CPT | Performed by: FAMILY MEDICINE

## 2022-02-10 PROCEDURE — G8432 DEP SCR NOT DOC, RNG: HCPCS | Performed by: FAMILY MEDICINE

## 2022-02-10 PROCEDURE — G8399 PT W/DXA RESULTS DOCUMENT: HCPCS | Performed by: FAMILY MEDICINE

## 2022-02-10 PROCEDURE — 3017F COLORECTAL CA SCREEN DOC REV: CPT | Performed by: FAMILY MEDICINE

## 2022-02-10 RX ORDER — METOPROLOL SUCCINATE 200 MG/1
200 TABLET, EXTENDED RELEASE ORAL DAILY
Qty: 90 TABLET | Refills: 0 | Status: SHIPPED | OUTPATIENT
Start: 2022-02-10 | End: 2022-03-25 | Stop reason: SDUPTHER

## 2022-02-10 RX ORDER — CALCIPOTRIENE 50 UG/G
CREAM TOPICAL 2 TIMES DAILY
Qty: 60 G | Refills: 2 | Status: SHIPPED | OUTPATIENT
Start: 2022-02-10

## 2022-02-10 NOTE — PROGRESS NOTES
Bonny Navas is a 73 y.o. female who was seen by synchronous (real-time) audio-video technology on 2/10/2022. Assessment & Plan:   Diagnoses and all orders for this visit:    1. Essential hypertension  -     metoprolol succinate (TOPROL-XL) 200 mg XL tablet; Take 1 Tablet by mouth daily. 2. Psoriasis  -     calcipotriene (DOVONEX) 0.005 % topical cream; Apply  to affected area two (2) times a day. Blood pressure borderline, elevated at home, did not increase Toprol XL  Increase Toprol XL  Refills per orders    Follow-up and Dispositions    · Return in about 6 weeks (around 3/24/2022) for blood pressure. Reviewed plan of care. Patient has provided input and agrees with goals. CPT Codes 73025-88135 for Established Patients may apply to this Telehealth Visit      Subjective:   Bonny Navas was seen for Medication Evaluation and Hypertension      Patient presents with:  Medication Evaluation  Hypertension    She did not increase her Toprol XL. Also, she needs a refill on her Calcipotriene cream .005% applied to her skin prn for her skin condition. Review of Systems   Eyes: Negative for blurred vision. Respiratory: Negative for shortness of breath. Cardiovascular: Negative for chest pain. Neurological: Negative for dizziness, sensory change, speech change, focal weakness and headaches. Objective:     Physical Exam  Constitutional:       General: She is not in acute distress. Appearance: Normal appearance. Neurological:      Mental Status: She is alert and oriented to person, place, and time. Due to this being a TeleHealth evaluation, many elements of the physical examination are unable to be assessed. We discussed the expected course, resolution and complications of the diagnosis(es) in detail. Medication risks, benefits, costs, interactions, and alternatives were discussed as indicated.   I advised her to contact the office if her condition worsens, changes or fails to improve as anticipated. She expressed understanding with the diagnosis(es) and plan. Pursuant to the emergency declaration under the [facility] waiver authority and the [employer] and [employer] Act, this Virtual  Visit was conducted, with patient's consent, to reduce the patient's risk of exposure to COVID-19 and provide continuity of care for an established patient. Services were provided through a video synchronous discussion virtually to substitute for in-person clinic visit.     Evon Rainey MD

## 2022-02-10 NOTE — PROGRESS NOTES
Chief Complaint   Patient presents with    Medication Evaluation    Hypertension     1. Have you been to the ER, urgent care clinic since your last visit? Hospitalized since your last visit? No    2. Have you seen or consulted any other health care providers outside of the [address] [place] [address] since your last visit? Include any pap smears or colon screening.  No

## 2022-03-01 ENCOUNTER — TELEPHONE (OUTPATIENT)
Dept: FAMILY MEDICINE CLINIC | Age: 72
End: 2022-03-01

## 2022-03-01 DIAGNOSIS — K27.9 PUD (PEPTIC ULCER DISEASE): Primary | ICD-10-CM

## 2022-03-01 NOTE — TELEPHONE ENCOUNTER
----- Message from Stephen Benson sent at 3/1/2022 10:28 AM EST -----  Subject: Message to Provider    QUESTIONS  Information for Provider? pt insurance says she needs a rx to get the   supplemental drinks she needs in order for insurance to help pay for them. pt wants to get them at [facility] cause they have more variety. please mail rx   to pt. pt is also stopping her rx's to the mail in pharmacy  ---------------------------------------------------------------------------  --------------  [address]  What is the best way for the office to contact you? OK to leave message on   voicemail  Preferred Call Back Phone Number? [phone]  ---------------------------------------------------------------------------  --------------  SCRIPT ANSWERS  Relationship to Patient?  Self

## 2022-03-03 RX ORDER — LACTOSE-REDUCED FOOD/FIBER
237 LIQUID (ML) ORAL 4 TIMES DAILY
Qty: 120 BOTTLE | Refills: 5 | Status: SHIPPED | OUTPATIENT
Start: 2022-03-03 | End: 2022-03-25 | Stop reason: ALTCHOICE

## 2022-03-18 PROBLEM — Z96.651 S/P TKR (TOTAL KNEE REPLACEMENT) USING CEMENT, RIGHT: Status: ACTIVE | Noted: 2021-10-26

## 2022-03-18 PROBLEM — F43.10 PTSD (POST-TRAUMATIC STRESS DISORDER): Status: ACTIVE | Noted: 2017-02-03

## 2022-03-18 PROBLEM — M79.7 FIBROMYALGIA: Status: ACTIVE | Noted: 2017-08-22

## 2022-03-19 PROBLEM — Z91.199 NON-COMPLIANCE WITH TREATMENT: Status: ACTIVE | Noted: 2017-08-01

## 2022-03-19 PROBLEM — G62.9 NEUROPATHY: Status: ACTIVE | Noted: 2017-08-22

## 2022-03-19 PROBLEM — F60.9 PERSONALITY DISORDER (HCC): Status: ACTIVE | Noted: 2017-08-01

## 2022-03-19 PROBLEM — F12.10 CANNABIS ABUSE: Status: ACTIVE | Noted: 2017-05-16

## 2022-03-19 PROBLEM — L40.9 PSORIASIS: Status: ACTIVE | Noted: 2022-02-10

## 2022-03-19 PROBLEM — F41.9 ANXIETY: Status: ACTIVE | Noted: 2017-02-03

## 2022-03-20 PROBLEM — R41.89 IMPAIRED COGNITION: Status: ACTIVE | Noted: 2017-02-03

## 2022-03-23 ENCOUNTER — TELEPHONE (OUTPATIENT)
Dept: FAMILY MEDICINE CLINIC | Age: 72
End: 2022-03-23

## 2022-03-23 NOTE — TELEPHONE ENCOUNTER
----- Message from Archana Montana sent at 3/23/2022  1:54 PM EDT -----  Subject: Referral Request    QUESTIONS   Reason for referral request? PT stated BP meds were doubled and bottom is   still high.(119/83 79 Pulse, 111/77 72 Pulse, 129/85 91 Pulse, 105/66 67   Pulse, 117/81 70 Pulse, 111/80 80 Pulse)She would like to get extensive   bloodwork done to try to find the root cause. [place] and would like to have everything situated by then. Has the physician seen you for this condition before? Yes  Select a date? 2022-02-10  Select the Provider the patient wants to be referred to, if known (PCP or   Specialist)? [facility]   Preferred Specialist (if applicable)? Do you already have an appointment scheduled? No  Additional Information for Provider? Pt going to be at the [facility] center   on 04/04 and would like to be able to get the bloodwork done while she is   there. Please advise.  ---------------------------------------------------------------------------  --------------  CALL BACK INFO  What is the best way for the office to contact you? OK to leave message on   voicemail  Preferred Call Back Phone Number?  [phone]

## 2022-03-24 ENCOUNTER — TELEPHONE (OUTPATIENT)
Dept: FAMILY MEDICINE CLINIC | Age: 72
End: 2022-03-24

## 2022-03-24 NOTE — TELEPHONE ENCOUNTER
Pt called requesting to  prescription for boost that was supposed to be mailed to her home for Boost High Energy/High protein (30 gm), low sugar (5 gm). Please check prescription on file. Unable to verify one done was correct. Pt wants to  tomorrow. Please advise.  Porfirio

## 2022-03-25 ENCOUNTER — VIRTUAL VISIT (OUTPATIENT)
Dept: FAMILY MEDICINE CLINIC | Age: 72
End: 2022-03-25
Payer: MEDICARE

## 2022-03-25 DIAGNOSIS — I10 ESSENTIAL HYPERTENSION: Primary | ICD-10-CM

## 2022-03-25 DIAGNOSIS — F31.81 BIPOLAR 2 DISORDER (HCC): ICD-10-CM

## 2022-03-25 DIAGNOSIS — E46 MALNUTRITION, UNSPECIFIED TYPE (HCC): ICD-10-CM

## 2022-03-25 DIAGNOSIS — F60.9 PERSONALITY DISORDER (HCC): ICD-10-CM

## 2022-03-25 PROCEDURE — 3017F COLORECTAL CA SCREEN DOC REV: CPT | Performed by: FAMILY MEDICINE

## 2022-03-25 PROCEDURE — 99214 OFFICE O/P EST MOD 30 MIN: CPT | Performed by: FAMILY MEDICINE

## 2022-03-25 PROCEDURE — G8417 CALC BMI ABV UP PARAM F/U: HCPCS | Performed by: FAMILY MEDICINE

## 2022-03-25 PROCEDURE — 1101F PT FALLS ASSESS-DOCD LE1/YR: CPT | Performed by: FAMILY MEDICINE

## 2022-03-25 PROCEDURE — G8536 NO DOC ELDER MAL SCRN: HCPCS | Performed by: FAMILY MEDICINE

## 2022-03-25 PROCEDURE — G8399 PT W/DXA RESULTS DOCUMENT: HCPCS | Performed by: FAMILY MEDICINE

## 2022-03-25 PROCEDURE — G9899 SCRN MAM PERF RSLTS DOC: HCPCS | Performed by: FAMILY MEDICINE

## 2022-03-25 PROCEDURE — G8510 SCR DEP NEG, NO PLAN REQD: HCPCS | Performed by: FAMILY MEDICINE

## 2022-03-25 PROCEDURE — G8427 DOCREV CUR MEDS BY ELIG CLIN: HCPCS | Performed by: FAMILY MEDICINE

## 2022-03-25 PROCEDURE — 1090F PRES/ABSN URINE INCON ASSESS: CPT | Performed by: FAMILY MEDICINE

## 2022-03-25 PROCEDURE — G8756 NO BP MEASURE DOC: HCPCS | Performed by: FAMILY MEDICINE

## 2022-03-25 RX ORDER — AMITRIPTYLINE HYDROCHLORIDE 50 MG/1
TABLET, FILM COATED ORAL
COMMUNITY
Start: 2022-01-04 | End: 2022-03-25

## 2022-03-25 RX ORDER — AMLODIPINE BESYLATE 10 MG/1
10 TABLET ORAL DAILY
Qty: 90 TABLET | Refills: 4 | Status: SHIPPED | OUTPATIENT
Start: 2022-03-25 | End: 2022-03-29 | Stop reason: SDUPTHER

## 2022-03-25 RX ORDER — LACTOSE-REDUCED FOOD 0.06G-1/ML
1 LIQUID (ML) ORAL 2 TIMES DAILY
Qty: 60 EACH | Refills: 11 | Status: SHIPPED | OUTPATIENT
Start: 2022-03-25

## 2022-03-25 RX ORDER — METOPROLOL SUCCINATE 200 MG/1
200 TABLET, EXTENDED RELEASE ORAL DAILY
Qty: 90 TABLET | Refills: 4 | Status: SHIPPED | OUTPATIENT
Start: 2022-03-25 | End: 2022-03-29 | Stop reason: SDUPTHER

## 2022-03-25 RX ORDER — QUETIAPINE FUMARATE 50 MG/1
50 TABLET, EXTENDED RELEASE ORAL
COMMUNITY
End: 2022-06-15

## 2022-03-25 NOTE — PROGRESS NOTES
Chief Complaint   Patient presents with    Hypertension     Follow up    Labs     1. Have you been to the ER, urgent care clinic since your last visit? Hospitalized since your last visit? No    2. Have you seen or consulted any other health care providers outside of the [address] [place] [address] since your last visit? Include any pap smears or colon screening.  No

## 2022-03-25 NOTE — PROGRESS NOTES
Srinivasa Pruett is a 73 y.o. female who was seen by synchronous (real-time) audio-video technology on 3/25/2022. Assessment & Plan:   Diagnoses and all orders for this visit:    1. Essential hypertension  -     METABOLIC PANEL, BASIC; Future  -     metoprolol succinate (TOPROL-XL) 200 mg XL tablet; Take 1 Tablet by mouth daily. -     amLODIPine (NORVASC) 10 mg tablet; Take 1 Tablet by mouth daily. 2. Personality disorder ([facility]    3. [facility] 2 [facility] ([facility]    4. Malnutrition, unspecified type ([facility]  -     food supplemt, lactose-reduced (Boost High Protein) 0.06 gram- 1 kcal/mL liqd; Take 1 Bottle by mouth two (2) times a day. Blood pressure controlled  Psychiatric problems doing well  Labs per orders. Continue current plans. Refills per orders    Follow-up and Dispositions    · Return in about 6 months (around 9/25/2022) for blood pressure. Reviewed plan of care. Patient has provided input and agrees with goals. CPT Codes 70138-75961 for Established Patients may apply to this Telehealth Visit      Subjective:   Srinivasa Pruett was seen for Hypertension (Follow up) and Labs      Patient presents with:  Hypertension: Follow up  Labs    Also, she is seeing Psychiatry for her personality disorder and bipolar 2. She is doing well. She needs a prescription for her Boost.  Evidently, she is malnourished due to her bariatric surgery. Review of Systems   Eyes: Negative for blurred vision. Respiratory: Negative for shortness of breath. Cardiovascular: Negative for chest pain. Neurological: Negative for dizziness, sensory change, speech change, focal weakness and headaches. Objective:   /85, P 65    Physical Exam  Constitutional:       General: She is not in acute distress. Appearance: Normal appearance. Neurological:      Mental Status: She is alert and oriented to person, place, and time.          Due to this being a TeleHealth evaluation, many elements of the physical examination are unable to be assessed. We discussed the expected course, resolution and complications of the diagnosis(es) in detail. Medication risks, benefits, costs, interactions, and alternatives were discussed as indicated. I advised her to contact the office if her condition worsens, changes or fails to improve as anticipated. She expressed understanding with the diagnosis(es) and plan. Pursuant to the emergency declaration under the [address] [place] [address], [facility] waiver authority and the [employer] and [employer] Act, this Virtual  Visit was conducted, with patient's consent, to reduce the patient's risk of exposure to COVID-19 and provide continuity of care for an established patient. Services were provided through a video synchronous discussion virtually to substitute for in-person clinic visit.     Freddie Hernandez MD

## 2022-03-29 DIAGNOSIS — I10 ESSENTIAL HYPERTENSION: ICD-10-CM

## 2022-03-29 DIAGNOSIS — K90.89 OTHER SPECIFIED INTESTINAL MALABSORPTION: ICD-10-CM

## 2022-03-29 NOTE — TELEPHONE ENCOUNTER
Pt going to [facility] for 3 months and is requesting 3 month supply of medications be sent to [facility]. Please call if any questions.  Porfirio

## 2022-03-30 RX ORDER — AMOXICILLIN 250 MG
CAPSULE ORAL
Qty: 180 TABLET | Refills: 3 | Status: SHIPPED | OUTPATIENT
Start: 2022-03-30

## 2022-03-30 RX ORDER — IBUPROFEN 600 MG/1
600 TABLET ORAL
Qty: 180 TABLET | Refills: 1 | Status: SHIPPED | OUTPATIENT
Start: 2022-03-30

## 2022-03-30 RX ORDER — METOPROLOL SUCCINATE 200 MG/1
200 TABLET, EXTENDED RELEASE ORAL DAILY
Qty: 90 TABLET | Refills: 3 | Status: SHIPPED | OUTPATIENT
Start: 2022-03-30 | End: 2022-04-11

## 2022-03-30 RX ORDER — ACETAMINOPHEN, DIPHENHYDRAMINE HCL, PHENYLEPHRINE HCL 325; 25; 5 MG/1; MG/1; MG/1
1 TABLET ORAL DAILY
Qty: 90 TABLET | Refills: 3 | Status: SHIPPED | OUTPATIENT
Start: 2022-03-30

## 2022-03-30 RX ORDER — AMLODIPINE BESYLATE 10 MG/1
10 TABLET ORAL DAILY
Qty: 90 TABLET | Refills: 3 | Status: SHIPPED | OUTPATIENT
Start: 2022-03-30 | End: 2022-06-15 | Stop reason: SDUPTHER

## 2022-04-05 DIAGNOSIS — M79.7 FIBROMYALGIA: ICD-10-CM

## 2022-04-05 DIAGNOSIS — G62.9 NEUROPATHY: ICD-10-CM

## 2022-04-05 RX ORDER — GABAPENTIN 800 MG/1
TABLET ORAL
Qty: 360 TABLET | Refills: 3 | Status: SHIPPED | OUTPATIENT
Start: 2022-04-05

## 2022-04-11 DIAGNOSIS — I10 ESSENTIAL HYPERTENSION: ICD-10-CM

## 2022-04-11 RX ORDER — METOPROLOL SUCCINATE 200 MG/1
200 TABLET, EXTENDED RELEASE ORAL DAILY
Qty: 90 TABLET | Refills: 3 | Status: SHIPPED | OUTPATIENT
Start: 2022-04-11 | End: 2022-06-15 | Stop reason: SDUPTHER

## 2022-04-25 ENCOUNTER — TELEPHONE (OUTPATIENT)
Dept: FAMILY MEDICINE CLINIC | Age: 72
End: 2022-04-25

## 2022-04-25 NOTE — TELEPHONE ENCOUNTER
[facility]   Electrophysiology Note            Reason for visit: 3 month follow up for palpitations and device management      HISTORY OF PRESENT ILLNESS: Claudia Zhong is a 31 y.o. female who presents for follow up for syncopal episodes and palpitations. Described palpitations as a \"racing heart rate. \" Palpitations had been occuring for the past 7 months. Started during pregnancy. Also complained of episodes of presyncope where her vision goes black and she becomes dizzy. Associated with palpitations. Denied actually passing out. Had associated hot flash and headache after the episodes. Rest resolved the episodes after approximately 15 minutes. Stated that episodes are related to exertion, sexual activity and walking up stairs. On 11/3/15 she was here per the advice of her PCP regarding her echo results. Echo results discussed in the office. She stated that she was having increasing fatigue since starting midodrine. Did state that the medication had been helping to decrease \"blacking out\" episodes. Denied chest pain, shortness of breath, edema and syncope. She has tilt table test on 2/15/17 that was negative. Interval history since last office visit:     Today she reports she has occasional palpitations that have improved somewhat with metoprolol 50 mg daily. She states she feels better on metoprolol. However she takes 1/2 tablet in the AM and 1/2 tablet in the PM. She reports she has occasional fatigue with episodes of palpitations. She denies CP, SOB, or syncope. Her device check showed normal function. Since 8/8/17 she has had 11 symptom and 71 tachy episodes recorded showing symptomatic tachycardia. PSVT, ST, AT. Past Medical History:   has a past medical history of Anemia; Anxiety; Heart abnormalities; Miscarriage; Ovarian cyst; and Tricuspid regurgitation. Past Surgical History:   has a past surgical history that includes Appendectomy (2006); Ovarian cyst surgery;  Endometrial Please have her see me today. No change in gait, balance, coordination, mood, affect, memory,  mentation, behavior. · Psychiatric: No anxiety, or depression. · Endocrine: No temperature intolerance. No excessive thirst, fluid intake, or urination. No tremor. · Hematologic/Lymphatic: No abnormal bruising or bleeding, blood clots or  swollen lymph nodes. · Allergic/Immunologic: No nasal congestion or hives. Physical Examination:    [facility] 08/07/2017 Comment: hyst 08/2017     Constitutional and General Appearance: alert, cooperative, no distress and appears stated age  [de-identified]: PERRL, no cervical lymphadenopathy. No masses palpable. Normal oral mucosa  Respiratory:  · Normal excursion and expansion without use of accessory muscles  · Resp Auscultation: Normal breath sounds without dullness or wheezing  Cardiovascular:  · The apical impulse is not displaced  · Heart tones are crisp and normal. regular S1 and S2.  · Jugular venous pulsation Normal  · The carotid upstroke is normal in amplitude and contour without delay or bruit  · Peripheral pulses are symmetrical and full  Abdomen:  · No masses or tenderness  · Bowel sounds present  Extremities:  ·  No Cyanosis or Clubbing  ·  Lower extremity edema: No  · Skin: Warm and dry  Neurological:  · Alert and oriented. · Moves all extremities well  · No abnormalities of mood, affect, memory, mentation, or behavior are noted      DATA:    ECG:  normal EKG, normal sinus rhythm, unchanged from previous tracings. ECHO: 1/6/2016   Normal stress ECHO.   The stress ECG showed no ischemia at target heart rate. Pryor Score of 4 . Pt   with poor exercise tolerance. IMPRESSION:    1. Sinus Tachycardia    2. Syncope,      Event monitor reviewed with patient. Symptoms with sinus tachycardia and normal sinus rhythm      RECOMMENDATIONS:  1. Increase metoprolol to 50 mg in the AM and 12.5 mg in the PM  2. Continue to monitor BP. Call office in 7-10 days with updates  3. Keep up with hydration  4.  Follow up with

## 2022-04-25 NOTE — TELEPHONE ENCOUNTER
Pt call transferred to office by Meseret Booth from [facility] with request for urgent appointment for abdominal pain. Pt states pain is a 7 out of 10 on pain scale, located just below belly button, but denies fever, nausea, vomiting or diarrhea. Pt agrees to seek urgent care if symptoms worsen.   Please advise if Dr. Bell Mijares wants pt to go to ER or if ok to keep appointment today at 3:45 pm. Porfirio

## 2022-04-26 LAB
COMMENT, HOLDF: NORMAL
SAMPLES BEING HELD,HOLD: NORMAL

## 2022-04-28 ENCOUNTER — TELEPHONE (OUTPATIENT)
Dept: FAMILY MEDICINE CLINIC | Age: 72
End: 2022-04-28

## 2022-04-28 LAB
BACTERIA SPEC CULT: ABNORMAL
CC UR VC: ABNORMAL
SERVICE CMNT-IMP: ABNORMAL

## 2022-04-28 RX ORDER — SULFAMETHOXAZOLE AND TRIMETHOPRIM 800; 160 MG/1; MG/1
1 TABLET ORAL 2 TIMES DAILY
Qty: 6 TABLET | Refills: 0 | Status: SHIPPED | OUTPATIENT
Start: 2022-04-28 | End: 2022-05-01

## 2022-04-28 NOTE — TELEPHONE ENCOUNTER
----- Message from [facility] sent at 4/28/2022  2:06 PM EDT -----  Subject: Message to Provider    QUESTIONS  Information for Provider? Patient said she seen her provider for her uti   problem on Monday but the symptoms are not getting better, patient said   the whole area hurts, and wanted to know if she can get a cultural done . patient said to please leave a voicemail. The provider Dr. Jasmin Faith told the   patient she will need a cultural done. Give patient a call back to advise   further. ---------------------------------------------------------------------------  --------------  Yajaira Castroandrew VINSON  What is the best way for the office to contact you? OK to leave message on   voicemail  Preferred Call Back Phone Number? [phone]  ---------------------------------------------------------------------------  --------------  SCRIPT ANSWERS  Relationship to Patient? Self  Have you recently (14 days) seen a provider for this problem?  Yes

## 2022-04-28 NOTE — TELEPHONE ENCOUNTER
Called pt, and she has been advised a new ABX has been sent to her pharmacy and she should be able to  when able.

## 2022-06-15 ENCOUNTER — VIRTUAL VISIT (OUTPATIENT)
Dept: FAMILY MEDICINE CLINIC | Age: 72
End: 2022-06-15
Payer: MEDICARE

## 2022-06-15 DIAGNOSIS — K90.89 OTHER SPECIFIED INTESTINAL MALABSORPTION: ICD-10-CM

## 2022-06-15 DIAGNOSIS — F31.9 BIPOLAR 1 DISORDER (HCC): ICD-10-CM

## 2022-06-15 DIAGNOSIS — U07.1 COVID-19: Primary | ICD-10-CM

## 2022-06-15 DIAGNOSIS — I10 ESSENTIAL HYPERTENSION: ICD-10-CM

## 2022-06-15 PROCEDURE — 1090F PRES/ABSN URINE INCON ASSESS: CPT | Performed by: FAMILY MEDICINE

## 2022-06-15 PROCEDURE — G8399 PT W/DXA RESULTS DOCUMENT: HCPCS | Performed by: FAMILY MEDICINE

## 2022-06-15 PROCEDURE — 99214 OFFICE O/P EST MOD 30 MIN: CPT | Performed by: FAMILY MEDICINE

## 2022-06-15 PROCEDURE — G8427 DOCREV CUR MEDS BY ELIG CLIN: HCPCS | Performed by: FAMILY MEDICINE

## 2022-06-15 PROCEDURE — G8756 NO BP MEASURE DOC: HCPCS | Performed by: FAMILY MEDICINE

## 2022-06-15 PROCEDURE — G9899 SCRN MAM PERF RSLTS DOC: HCPCS | Performed by: FAMILY MEDICINE

## 2022-06-15 PROCEDURE — 1101F PT FALLS ASSESS-DOCD LE1/YR: CPT | Performed by: FAMILY MEDICINE

## 2022-06-15 PROCEDURE — 1123F ACP DISCUSS/DSCN MKR DOCD: CPT | Performed by: FAMILY MEDICINE

## 2022-06-15 PROCEDURE — 3017F COLORECTAL CA SCREEN DOC REV: CPT | Performed by: FAMILY MEDICINE

## 2022-06-15 PROCEDURE — G9717 DOC PT DX DEP/BP F/U NT REQ: HCPCS | Performed by: FAMILY MEDICINE

## 2022-06-15 RX ORDER — METOPROLOL SUCCINATE 200 MG/1
200 TABLET, EXTENDED RELEASE ORAL DAILY
Qty: 90 TABLET | Refills: 3 | Status: SHIPPED | OUTPATIENT
Start: 2022-06-15

## 2022-06-15 RX ORDER — BISMUTH SUBSALICYLATE 262 MG
2 TABLET,CHEWABLE ORAL DAILY
Qty: 180 TABLET | Refills: 4 | Status: SHIPPED | OUTPATIENT
Start: 2022-06-15

## 2022-06-15 RX ORDER — OLANZAPINE 5 MG/1
10 TABLET ORAL
COMMUNITY
Start: 2022-05-28

## 2022-06-15 RX ORDER — NIRMATRELVIR AND RITONAVIR 300-100 MG
KIT ORAL
Qty: 1 BOX | Refills: 0 | Status: SHIPPED | OUTPATIENT
Start: 2022-06-15

## 2022-06-15 RX ORDER — AMLODIPINE BESYLATE 10 MG/1
10 TABLET ORAL DAILY
Qty: 90 TABLET | Refills: 3 | Status: SHIPPED | OUTPATIENT
Start: 2022-06-15

## 2022-06-15 NOTE — PROGRESS NOTES
Chief Complaint   Patient presents with    Positive For Covid-19    Medication Refill     1. Have you been to the ER, urgent care clinic since your last visit? Hospitalized since your last visit? No    2. Have you seen or consulted any other health care providers outside of the [address] [place] [address] since your last visit? Include any pap smears or colon screening.  No

## 2022-06-15 NOTE — PROGRESS NOTES
Jordan Huffman is a 73 y.o. female who was seen by synchronous (real-time) audio-video technology on 6/15/2022. Consent: Jordan Huffman, who was seen by synchronous (real-time) audio-video technology, and/or her healthcare decision maker, is aware that this patient-initiated, Telehealth encounter on 6/15/2022 is a billable service, with coverage as determined by her insurance carrier. She is aware that she may receive a bill and has provided verbal consent to proceed: Yes. Assessment & Plan:   1. Essential hypertension  Reports at goal  Requesting refills provided  - amLODIPine (NORVASC) 10 mg tablet; Take 1 Tablet by mouth daily. Dispense: 90 Tablet; Refill: 3  - metoprolol succinate (TOPROL-XL) 200 mg XL tablet; Take 1 Tablet by mouth daily. Dispense: 90 Tablet; Refill: 3    2. Other specified intestinal malabsorption  Reportstable and requesting refill  - ferrous sulfate (SLOW FE) 142 mg (45 mg iron) ER tablet; Take 1 Tablet by mouth Before breakfast and dinner. Dispense: 90 Tablet; Refill: 3  - multivitamin (ONE A DAY) tablet; Take 2 Tablets by mouth daily. Dispense: 180 Tablet; Refill: 4    3. COVID-19  Dx within last 5 d  High risk for decompensation given age and comorbidities  Will send paxlovid , reviewed interactions, OK  Take per instructions  Sent to [facility] pharmacy b/c of stock  - nirmatrelvir-ritonavir (Paxlovid, EUA,) 150 mg x 2- 100 mg tablet; Take per package instructions  Dispense: 1 Box; Refill: 0    4. Bipolar 1 disorder ([address]  Improved with psych treatment she reports to me  - OLANZapine (ZyPREXA) 5 mg tablet; 10 mg. Pt was counseled on risks, benefits and alternatives of treatment options. All questions were asked and answered and the patient was agreeable with the treatment plan as outlined.   .    Subjective:   Jordan Huffman is a 73 y.o. female who was seen for Positive For Covid-19 and Medication Refill      Tested positive for covid on Saturday  She's feeling a lot of upset stomach    Recently tells me she was diagnosed with bipolar 1 and she is on some different meds--on Zyprexa now instead of seroquel      Medications, allergies, PMH, PSH, SOCH, [address] reviewed and updated per routine protocol, see chart for review and changes if not noted here. ROS  A 12 point review of systems was negative except as noted here or in the HPI.     Objective:   Vital Signs: (As obtained by patient/caregiver at home)  Patient-Reported Vitals 6/15/2022   Patient-Reported Weight 137lb   Patient-Reported Height -   Patient-Reported Pulse 64   Patient-Reported Temperature -   Patient-Reported Systolic  149   Patient-Reported Diastolic 71        [INSTRUCTIONS:  \"[x]\" Indicates a positive item  \"[]\" Indicates a negative item  -- DELETE ALL ITEMS NOT EXAMINED]    Constitutional: [x] Appears well-developed and well-nourished [x] No apparent distress      [] Abnormal -     Mental status: [x] Alert and awake  [x] Oriented to person/place/time [x] Able to follow commands    [] Abnormal -     Eyes:   EOM    [x]  Normal    [] Abnormal -   Sclera  [x]  Normal    [] Abnormal -          Discharge [x]  None visible   [] Abnormal -     HENT: [x] Normocephalic, atraumatic  [] Abnormal -   [x] Mouth/Throat: Mucous membranes are moist    External Ears [x] Normal  [] Abnormal -    Neck: [x] No visualized mass [] Abnormal -     Pulmonary/Chest: [x] Respiratory effort normal   [x] No visualized signs of difficulty breathing or respiratory distress        [] Abnormal -      Musculoskeletal:   [] Normal gait with no signs of ataxia         [x] Normal range of motion of neck        [] Abnormal -     Neurological:        [x] No Facial Asymmetry (Cranial nerve 7 motor function) (limited exam due to video visit)          [x] No gaze palsy        [] Abnormal -          Skin:        [x] No significant exanthematous lesions or discoloration noted on facial skin         [] Abnormal -            Psychiatric:       [x] Normal Affect [] Abnormal -        [x] No Hallucinations    Other pertinent observable physical exam findings:ambulatory no sob      We discussed the expected course, resolution and complications of the diagnosis(es) in detail. Medication risks, benefits, costs, interactions, and alternatives were discussed as indicated. I advised her to contact the office if her condition worsens, changes or fails to improve as anticipated. She expressed understanding with the diagnosis(es) and plan. Yaya Schmitt is a 73 y.o. female who was evaluated by a video visit encounter for concerns as above. Patient identification was verified prior to start of the visit. A caregiver was present when appropriate. Due to this being a TeleHealth encounter (During AAAVL-03 public health emergency), evaluation of the following organ systems was limited: Vitals/Constitutional/EENT/Resp/CV/GI//MS/Neuro/Skin/Heme-Lymph-Imm. Pursuant to the emergency declaration under the [facility], [facility] waiver authority and the [employer] and [employer] Act, this Virtual  Visit was conducted, with patient's (and/or legal guardian's) consent, to reduce the patient's risk of exposure to COVID-19 and provide necessary medical care. Services were provided through a video synchronous discussion virtually to substitute for in-person clinic visit. Patient and provider were located at their individual homes. Teresita Bob MD  [facility] [place]  06/15/22 3:32 PM     Portions of this note may have been populated using smart dictation software and may have \"sounds-like\" errors present.

## 2022-06-16 ENCOUNTER — TELEPHONE (OUTPATIENT)
Dept: FAMILY MEDICINE CLINIC | Age: 72
End: 2022-06-16

## 2022-06-16 NOTE — TELEPHONE ENCOUNTER
Pt called stating Dr. Earl Lynn saw her yesterday, prescribed medication for Covid, but it's not touching the pain, tried taking Celebrex without relief, and wants call back at  to advise if Dr. Earl Lynn can send Tylenol 3 to [facility] for her symptoms. Pt advised Dr. Earl Lynn doesn't normally prescribe this type of medication but will send message. Pt agrees to plan.  Porfirio

## 2022-06-16 NOTE — TELEPHONE ENCOUNTER
Attempted to contact patient at number on file, no answer, left a message on patient's personal VM, per Dr. Ravin Mckee No outpatient mgmt for covid with either tylenol or advil is my recommendation. I do not prescribe tylenol 3.

## 2022-06-16 NOTE — TELEPHONE ENCOUNTER
No outpatient mgmt for covid with either tylenol or advil is my recommendation.  I do not prescribe tylenol 3

## 2022-08-23 RX ORDER — METAXALONE 800 MG/1
TABLET ORAL
Qty: 45 TABLET | Refills: 2 | Status: SHIPPED | OUTPATIENT
Start: 2022-08-23

## 2022-08-25 ENCOUNTER — TELEPHONE (OUTPATIENT)
Dept: FAMILY MEDICINE CLINIC | Age: 72
End: 2022-08-25

## 2022-11-25 ENCOUNTER — TELEPHONE (OUTPATIENT)
Dept: FAMILY MEDICINE CLINIC | Age: 72
End: 2022-11-25

## 2022-11-25 NOTE — TELEPHONE ENCOUNTER
Prior auth request for Metaxalone 800 mg tablets faxed by [facility] mail order pharmacy.  Porfirio

## 2022-12-19 DIAGNOSIS — M79.7 FIBROMYALGIA: ICD-10-CM

## 2022-12-19 DIAGNOSIS — G62.9 NEUROPATHY: ICD-10-CM

## 2022-12-20 RX ORDER — GABAPENTIN 800 MG/1
TABLET ORAL
Qty: 360 TABLET | Refills: 0 | Status: SHIPPED | OUTPATIENT
Start: 2022-12-20

## 2022-12-20 NOTE — TELEPHONE ENCOUNTER
Please call patient and schedule them for a gabapentin follow up. I cannot continue to refill it until she comes in. Syed Amaya

## 2023-01-20 RX ORDER — CYCLOBENZAPRINE HYDROCHLORIDE 7.5 MG/1
TABLET, FILM COATED ORAL
Qty: 90 TABLET | Refills: 2 | OUTPATIENT
Start: 2023-01-20

## 2023-01-20 NOTE — TELEPHONE ENCOUNTER
Called and spoke with pt. She is currently not taking either at this time. Skelaxin is not covered by her insurance and she needs either of the two below to be prescribed to take it's place.

## 2023-01-22 RX ORDER — METHOCARBAMOL 500 MG/1
500 TABLET, FILM COATED ORAL 4 TIMES DAILY
Qty: 40 TABLET | Refills: 2 | Status: SHIPPED | OUTPATIENT
Start: 2023-01-22

## 2023-01-26 ENCOUNTER — VIRTUAL VISIT (OUTPATIENT)
Dept: FAMILY MEDICINE CLINIC | Age: 73
End: 2023-01-26
Payer: MEDICARE

## 2023-01-26 DIAGNOSIS — F10.10 ALCOHOL ABUSE: ICD-10-CM

## 2023-01-26 DIAGNOSIS — F31.9 BIPOLAR 1 DISORDER (HCC): ICD-10-CM

## 2023-01-26 DIAGNOSIS — G62.9 NEUROPATHY: ICD-10-CM

## 2023-01-26 DIAGNOSIS — M79.7 FIBROMYALGIA: ICD-10-CM

## 2023-01-26 DIAGNOSIS — I10 ESSENTIAL HYPERTENSION: Primary | ICD-10-CM

## 2023-01-26 DIAGNOSIS — K90.89 OTHER SPECIFIED INTESTINAL MALABSORPTION: ICD-10-CM

## 2023-01-26 DIAGNOSIS — T43.615A ADVERSE EFFECT OF CAFFEINE, INITIAL ENCOUNTER: ICD-10-CM

## 2023-01-26 DIAGNOSIS — F12.10 CANNABIS ABUSE: ICD-10-CM

## 2023-01-26 PROCEDURE — 3017F COLORECTAL CA SCREEN DOC REV: CPT | Performed by: FAMILY MEDICINE

## 2023-01-26 PROCEDURE — G9717 DOC PT DX DEP/BP F/U NT REQ: HCPCS | Performed by: FAMILY MEDICINE

## 2023-01-26 PROCEDURE — 99214 OFFICE O/P EST MOD 30 MIN: CPT | Performed by: FAMILY MEDICINE

## 2023-01-26 PROCEDURE — G8417 CALC BMI ABV UP PARAM F/U: HCPCS | Performed by: FAMILY MEDICINE

## 2023-01-26 PROCEDURE — 1101F PT FALLS ASSESS-DOCD LE1/YR: CPT | Performed by: FAMILY MEDICINE

## 2023-01-26 PROCEDURE — 1090F PRES/ABSN URINE INCON ASSESS: CPT | Performed by: FAMILY MEDICINE

## 2023-01-26 PROCEDURE — G9899 SCRN MAM PERF RSLTS DOC: HCPCS | Performed by: FAMILY MEDICINE

## 2023-01-26 PROCEDURE — G8399 PT W/DXA RESULTS DOCUMENT: HCPCS | Performed by: FAMILY MEDICINE

## 2023-01-26 PROCEDURE — 1123F ACP DISCUSS/DSCN MKR DOCD: CPT | Performed by: FAMILY MEDICINE

## 2023-01-26 PROCEDURE — G8427 DOCREV CUR MEDS BY ELIG CLIN: HCPCS | Performed by: FAMILY MEDICINE

## 2023-01-26 PROCEDURE — G8536 NO DOC ELDER MAL SCRN: HCPCS | Performed by: FAMILY MEDICINE

## 2023-01-26 RX ORDER — AMLODIPINE BESYLATE 10 MG/1
10 TABLET ORAL DAILY
Qty: 90 TABLET | Refills: 0 | Status: SHIPPED | OUTPATIENT
Start: 2023-01-26

## 2023-01-26 RX ORDER — DULOXETIN HYDROCHLORIDE 30 MG/1
CAPSULE, DELAYED RELEASE ORAL
COMMUNITY
Start: 2023-01-25

## 2023-01-26 RX ORDER — QUETIAPINE FUMARATE 50 MG/1
100 TABLET, EXTENDED RELEASE ORAL DAILY
COMMUNITY

## 2023-01-26 RX ORDER — METOPROLOL SUCCINATE 200 MG/1
200 TABLET, EXTENDED RELEASE ORAL DAILY
Qty: 90 TABLET | Refills: 0 | Status: SHIPPED | OUTPATIENT
Start: 2023-01-26

## 2023-01-26 RX ORDER — QUETIAPINE FUMARATE 50 MG/1
50 TABLET, FILM COATED ORAL DAILY
COMMUNITY
Start: 2023-01-11

## 2023-01-26 RX ORDER — MULTIVITAMIN
2 TABLET ORAL DAILY
Qty: 180 TABLET | Refills: 4 | Status: SHIPPED | OUTPATIENT
Start: 2023-01-26

## 2023-01-26 RX ORDER — GABAPENTIN 800 MG/1
TABLET ORAL
Qty: 360 TABLET | Refills: 0 | OUTPATIENT
Start: 2023-01-26

## 2023-01-26 NOTE — PROGRESS NOTES
Chief Complaint   Patient presents with    Hypertension     Elevated B/P readings x 3 months. Dizziness     Patient states that she gets dizzy before taking her B/P medications with may be related to her elevated B/P readings. 1. Have you been to the ER, urgent care clinic since your last visit? Hospitalized since your last visit? No    2. Have you seen or consulted any other health care providers outside of the [address] [place] [address] since your last visit? Include any pap smears or colon screening.  No

## 2023-01-26 NOTE — PROGRESS NOTES
Zoraida Carter is a 81 y.o. female who was seen by synchronous (real-time) audio-video technology on 1/26/2023. Assessment & Plan:   Diagnoses and all orders for this visit:    1. Essential hypertension  -     amLODIPine (NORVASC) 10 mg tablet; Take 1 Tablet by mouth daily. -     metoprolol succinate (TOPROL-XL) 200 mg XL tablet; Take 1 Tablet by mouth daily. 2. Adverse effect of caffeine, initial encounter    3. Bipolar 1 disorder ([facility]    4. Cannabis abuse    5. Alcohol abuse      Blood pressure elevated due to excessive caffeine use  Bipolar doing well, no drugs or alcohol  Wean back on caffeine  Regular exercise  Continue current plans. Refills per orders    Follow-up and Dispositions    Return in about 1 month (around 2/26/2023) for blood pressure. Reviewed plan of care. Patient has provided input and agrees with goals. CPT Codes 65861-11069 for Established Patients may apply to this Telehealth Visit      Subjective:   Zoraida Carter was seen for Hypertension (Elevated B/P readings x 3 months.) and Dizziness (Patient states that she gets dizzy before taking her B/P medications with may be related to her elevated B/P readings.)      Patient presents with:  Hypertension: Elevated B/P readings x 3 months. Dizziness: Patient states that she gets dizzy before taking her B/P medications with may be related to her elevated B/P readings. She only had dizziness, like vertigo, once several days ago. Also, she is stressed about her kids. She takes her medications HS and her high readings are in the afternoon and evening. Also, she drinks about 5 cups of coffee a day. Also she continues to see Psychiatry and a therapist regularly for her bipolar and it is doing well. No alcohol or drugs. Review of Systems   Eyes:  Negative for blurred vision. Respiratory:  Negative for shortness of breath. Cardiovascular:  Negative for chest pain.    Neurological:  Negative for dizziness, sensory change, speech change, focal weakness and headaches. Objective:   151/90 and 120/82    Physical Exam  Constitutional:       General: She is not in acute distress. Appearance: Normal appearance. Pulmonary:      Breath sounds: No wheezing. Neurological:      Mental Status: She is alert and oriented to person, place, and time. Due to this being a TeleHealth evaluation, many elements of the physical examination are unable to be assessed. We discussed the expected course, resolution and complications of the diagnosis(es) in detail. Medication risks, benefits, costs, interactions, and alternatives were discussed as indicated. I advised her to contact the office if her condition worsens, changes or fails to improve as anticipated. She expressed understanding with the diagnosis(es) and plan. Pursuant to the emergency declaration under the [facility], [facility] waiver authority and the [employer] and [employer] Act, this Virtual  Visit was conducted, with patient's consent, to reduce the patient's risk of exposure to COVID-19 and provide continuity of care for an established patient. Services were provided through a video synchronous discussion virtually to substitute for in-person clinic visit.     Alta Moore MD

## 2023-02-06 ENCOUNTER — VIRTUAL VISIT (OUTPATIENT)
Dept: FAMILY MEDICINE CLINIC | Age: 73
End: 2023-02-06
Payer: MEDICARE

## 2023-02-06 DIAGNOSIS — L40.9 PSORIASIS: ICD-10-CM

## 2023-02-06 DIAGNOSIS — I10 ESSENTIAL HYPERTENSION: ICD-10-CM

## 2023-02-06 DIAGNOSIS — M81.0 AGE RELATED OSTEOPOROSIS, UNSPECIFIED PATHOLOGICAL FRACTURE PRESENCE: ICD-10-CM

## 2023-02-06 DIAGNOSIS — K27.9 PUD (PEPTIC ULCER DISEASE): ICD-10-CM

## 2023-02-06 DIAGNOSIS — Z72.0 TOBACCO ABUSE: ICD-10-CM

## 2023-02-06 DIAGNOSIS — Z78.0 POSTMENOPAUSAL: ICD-10-CM

## 2023-02-06 DIAGNOSIS — M79.7 FIBROMYALGIA: ICD-10-CM

## 2023-02-06 DIAGNOSIS — Z12.31 ENCOUNTER FOR SCREENING MAMMOGRAM FOR MALIGNANT NEOPLASM OF BREAST: ICD-10-CM

## 2023-02-06 DIAGNOSIS — Z71.89 ADVANCED DIRECTIVES, COUNSELING/DISCUSSION: ICD-10-CM

## 2023-02-06 DIAGNOSIS — Z00.00 MEDICARE ANNUAL WELLNESS VISIT, SUBSEQUENT: Primary | ICD-10-CM

## 2023-02-06 DIAGNOSIS — R73.01 IMPAIRED FASTING GLUCOSE: ICD-10-CM

## 2023-02-06 DIAGNOSIS — G62.9 NEUROPATHY: ICD-10-CM

## 2023-02-06 PROCEDURE — 99406 BEHAV CHNG SMOKING 3-10 MIN: CPT | Performed by: FAMILY MEDICINE

## 2023-02-06 PROCEDURE — G9899 SCRN MAM PERF RSLTS DOC: HCPCS | Performed by: FAMILY MEDICINE

## 2023-02-06 PROCEDURE — 99497 ADVNCD CARE PLAN 30 MIN: CPT | Performed by: FAMILY MEDICINE

## 2023-02-06 PROCEDURE — 99214 OFFICE O/P EST MOD 30 MIN: CPT | Performed by: FAMILY MEDICINE

## 2023-02-06 PROCEDURE — 1090F PRES/ABSN URINE INCON ASSESS: CPT | Performed by: FAMILY MEDICINE

## 2023-02-06 PROCEDURE — G8417 CALC BMI ABV UP PARAM F/U: HCPCS | Performed by: FAMILY MEDICINE

## 2023-02-06 PROCEDURE — 1123F ACP DISCUSS/DSCN MKR DOCD: CPT | Performed by: FAMILY MEDICINE

## 2023-02-06 PROCEDURE — G8536 NO DOC ELDER MAL SCRN: HCPCS | Performed by: FAMILY MEDICINE

## 2023-02-06 PROCEDURE — G8427 DOCREV CUR MEDS BY ELIG CLIN: HCPCS | Performed by: FAMILY MEDICINE

## 2023-02-06 PROCEDURE — G9717 DOC PT DX DEP/BP F/U NT REQ: HCPCS | Performed by: FAMILY MEDICINE

## 2023-02-06 PROCEDURE — G0439 PPPS, SUBSEQ VISIT: HCPCS | Performed by: FAMILY MEDICINE

## 2023-02-06 PROCEDURE — 1101F PT FALLS ASSESS-DOCD LE1/YR: CPT | Performed by: FAMILY MEDICINE

## 2023-02-06 PROCEDURE — 3017F COLORECTAL CA SCREEN DOC REV: CPT | Performed by: FAMILY MEDICINE

## 2023-02-06 RX ORDER — GABAPENTIN 800 MG/1
TABLET ORAL
Qty: 360 TABLET | Refills: 2 | Status: SHIPPED | OUTPATIENT
Start: 2023-02-06

## 2023-02-06 NOTE — PROGRESS NOTES
Chief Complaint   Patient presents with    Annual Wellness Visit     1. Have you been to the ER, urgent care clinic since your last visit? Hospitalized since your last visit? No    2. Have you seen or consulted any other health care providers outside of the [address] [place] [address] since your last visit? Include any pap smears or colon screening.  No

## 2023-02-06 NOTE — PATIENT INSTRUCTIONS
Medicare Wellness Visit, Female     The best way to live healthy is to have a lifestyle where you eat a well-balanced diet, exercise regularly, limit alcohol use, and quit all forms of tobacco/nicotine, if applicable. Regular preventive services are another way to keep healthy. Preventive services (vaccines, screening tests, monitoring & exams) can help personalize your care plan, which helps you manage your own care. Screening tests can find health problems at the earliest stages, when they are easiest to treat. [facility] follows the current, evidence-based guidelines published by the [facility] ([facility]) when recommending preventive services for our patients. Because we follow these guidelines, sometimes recommendations change over time as research supports it. (For example, mammograms used to be recommended annually. Even though Medicare will still pay for an annual mammogram, the newer guidelines recommend a mammogram every two years for women of average risk). Of course, you and your doctor may decide to screen more often for some diseases, based on your risk and your co-morbidities (chronic disease you are already diagnosed with). Preventive services for you include:  - Medicare offers their members a free annual wellness visit, which is time for you and your primary care provider to discuss and plan for your preventive service needs.  Take advantage of this benefit every year!    -Over the age of 72 should receive the recommended pneumonia vaccines.    -All adults should have a flu vaccine yearly.  -All adults should have a tetanus vaccine every 10 years.   -Over the age 48 should receive the shingles vaccines.        -All adults should be screened once for Hepatitis C.  -All adults age 38-68 who are overweight should have a diabetes screening test once every three years.   -Other screening tests and preventive services for persons with diabetes include: an eye exam to screen for diabetic retinopathy, a kidney function test, a foot exam, and stricter control over your cholesterol.   -Cardiovascular screening for adults with routine risk involves an electrocardiogram (ECG) at intervals determined by your doctor.     -Colorectal cancer screenings should be done for adults age 39-70 with no increased risk factors for colorectal cancer. There are a number of acceptable methods of screening for this type of cancer. Each test has its own benefits and drawbacks. Discuss with your doctor what is most appropriate for you during your annual wellness visit. The different tests include: colonoscopy (considered the best screening method), a fecal occult blood test, a fecal DNA test, and sigmoidoscopy.    -Lung cancer screening is recommended annually with a low dose CT scan for adults between age 54 and 68, who have smoked at least 30 pack years (equivalent of 1 pack per day for 30 days), and who is a current smoker or quit less than 15 years ago.    -A bone mass density test is recommended when a woman turns 65 to screen for osteoporosis. This test is only recommended one time, as a screening. Some providers will use this same test as a disease monitoring tool if you already have osteoporosis. -Breast cancer screenings are recommended every other year for women of normal risk, age 54-69.    -Cervical cancer screenings for women over age 72 are only recommended with certain risk factors.      Here is a list of your current Health Maintenance items (your personalized list of preventive services) with a due date:  Health Maintenance Due   Topic Date Due    COVID-19 Vaccine (4 - Booster for Birtha Asper series) 03/17/2022    Yearly Flu Vaccine (1) 08/01/2022

## 2023-02-06 NOTE — PROGRESS NOTES
This is the Subsequent Medicare Annual Wellness Exam, performed 12 months or more after the Initial AWV or the last Subsequent AWV    I have reviewed the patient's medical history in detail and updated the computerized patient record. Assessment/Plan   Education and counseling provided:  Are appropriate based on today's review and evaluation    1. Medicare annual wellness visit, subsequent  -     [place] TOBACCO USE CESSATION INTERMEDIATE 3-10 MINUTES  2. Tobacco abuse  -     [place] TOBACCO USE CESSATION INTERMEDIATE 3-10 MINUTES  3. Essential hypertension  -     HEMOGLOBIN A1C WITH EAG; Future  -     CBC W/O DIFF; Future  -     METABOLIC PANEL, COMPREHENSIVE; Future  -     MICROALBUMIN, UR, RAND W/ MICROALB/CREAT RATIO; Future  -     LIPID PANEL; Future  4. PUD (peptic ulcer disease)  5. Psoriasis  6. Fibromyalgia  -     gabapentin (NEURONTIN) 800 mg tablet; TAKE 1 TABLET BY MOUTH 4 TIMES DAILY . MAXIMUM DAILY AMOUNT / 4 TABLETS, Normal, Disp-360 Tablet, R-2  -     HEMOGLOBIN A1C WITH EAG; Future  -     CBC W/O DIFF; Future  -     METABOLIC PANEL, COMPREHENSIVE; Future  -     MICROALBUMIN, UR, RAND W/ MICROALB/CREAT RATIO; Future  -     LIPID PANEL; Future  7. Neuropathy  -     gabapentin (NEURONTIN) 800 mg tablet; TAKE 1 TABLET BY MOUTH 4 TIMES DAILY . MAXIMUM DAILY AMOUNT / 4 TABLETS, Normal, Disp-360 Tablet, R-2  -     HEMOGLOBIN A1C WITH EAG; Future  -     CBC W/O DIFF; Future  -     METABOLIC PANEL, COMPREHENSIVE; Future  -     MICROALBUMIN, UR, RAND W/ MICROALB/CREAT RATIO; Future  -     LIPID PANEL; Future  8. Encounter for screening mammogram for malignant neoplasm of breast  -     [facility] 3D JEVON W MAMMO BI SCREENING INCL CAD; Future  9. Age related osteoporosis, unspecified pathological fracture presence  -     DEXA BONE DENSITY STUDY AXIAL; Future  10. Postmenopausal  -     DEXA BONE DENSITY STUDY AXIAL; Future  11. Impaired fasting glucose  -     HEMOGLOBIN A1C WITH EAG;  Future  -     CBC W/O DIFF; Future  -     METABOLIC PANEL, COMPREHENSIVE; Future  -     MICROALBUMIN, UR, RAND W/ MICROALB/CREAT RATIO; Future  -     LIPID PANEL; Future  12. Advanced directives, counseling/discussion  -     FULL CODE  -     ADVANCE CARE PLANNING FIRST 30 MINS       Depression Risk Factor Screening     3 most recent PHQ Screens 2/6/2023   PHQ Not Done -   Little interest or pleasure in doing things Not at all   Feeling down, depressed, irritable, or hopeless Not at all   Total Score PHQ 2 0       Alcohol & Drug Abuse Risk Screen   Do you average more than 1 drink per night or more than 7 drinks a week?: No  On any one occasion in the past three months have you had more than 3 drinks containing alcohol?: No            Functional Ability and Level of Safety   Hearing:  Hearing: Patient reports hearing is good      Activities of Daily Living: The home contains: handrails, grab bars  Functional ADLs: Patient does total self care     Ambulation:  Patient ambulates: with difficulty  How far the patient can walk with difficulty: 500 ft  Walking is difficult due to: pain  Walking aids: walker     Fall Risk:  Fall Risk Assessment, last 12 mths 7/14/2021   Able to walk? Yes   Fall in past 12 months? 0   Do you feel unsteady?  0   Are you worried about falling 0     Abuse Screen:  Do you ever feel afraid of your partner?: (P) No  Are you in a relationship with someone who physically or mentally threatens you?: (P) No  Is it safe for you to go home?: (P) Yes        Cognitive Screening   Has your family/caregiver stated any concerns about your memory?: No     Cognitive Screening: Normal - Verbal Fluency Test    Health Maintenance Due     Health Maintenance Due   Topic Date Due    COVID-19 Vaccine (4 - Booster for Garth Early series) 03/17/2022    Flu Vaccine (1) 08/01/2022       Patient Care Team   Patient Care Team:  Cj Page MD as PCP - General (Family Medicine)  Cj Page MD as PCP - [facility] Empaneled Provider  Shay Danielson MD as Surgeon (General Surgery)    History     Patient Active Problem List   Diagnosis Code    PUD (peptic ulcer disease) K27.9    Alcohol abuse F10.10    Essential hypertension I10    Impaired cognition R41.89    Anxiety F41.9    PTSD (post-traumatic stress disorder) F43.10    Cannabis abuse F12.10    Non-compliance with treatment Z91.199    Personality disorder ([facility] F60.9    Fibromyalgia M79.7    Neuropathy G62.9    S/P TKR (total knee replacement) using cement, right Z96.651    Psoriasis L40.9    Bipolar 2 disorder ([facility] F31.81     Past Medical History:   Diagnosis Date    Anxiety     Bipolar 2 disorder ([facility] 09/2018    Blurred vision     COVID-19 vaccine series completed     Fibromyalgia     Hemorrhoids     Hepatitis C virus infection cured after antiviral drug therapy     History of bleeding peptic ulcer 2016 & 2017    History of smoking     Psoriasis 2/10/2022    PTSD (post-traumatic stress disorder)     Stress       Past Surgical History:   Procedure Laterality Date    HX BREAST REDUCTION  08/30/2020    HX CHOLECYSTECTOMY      HX GASTRIC BYPASS       Current Outpatient Medications   Medication Sig Dispense Refill    gabapentin (NEURONTIN) 800 mg tablet TAKE 1 TABLET BY MOUTH 4 TIMES DAILY . MAXIMUM DAILY AMOUNT / 4 TABLETS 360 Tablet 2    ferrous sulfate (SLOW FE) 142 mg (45 mg iron) ER tablet Take 1 Tablet by mouth Before breakfast and dinner. 90 Tablet 3    multivitamin (ONE A DAY) tablet Take 2 Tablets by mouth daily. 180 Tablet 4    DULoxetine (CYMBALTA) 30 mg capsule       QUEtiapine (SEROquel) 50 mg tablet Take 50 mg by mouth daily. QUEtiapine SR (SEROquel XR) 50 mg sr tablet Take 100 mg by mouth daily. Patient takes 2 tablets once daily. amLODIPine (NORVASC) 10 mg tablet Take 1 Tablet by mouth daily. 90 Tablet 0    metoprolol succinate (TOPROL-XL) 200 mg XL tablet Take 1 Tablet by mouth daily. 90 Tablet 0    methocarbamoL (ROBAXIN) 500 mg tablet Take 1 Tablet by mouth four (4) times daily.  36 Tablet 2    ibuprofen (MOTRIN) 600 mg tablet Take 1 Tablet by mouth every six (6) hours as needed for Pain. 180 Tablet 1    divalproex DR (DEPAKOTE) 250 mg tablet 250 mg two (2) times a day. Allergies   Allergen Reactions    Tizanidine Other (comments)       Family History   Problem Relation Age of Onset    No Known Problems Mother     Anesth Problems Neg Hx      Social History     Tobacco Use    Smoking status: Every Day     Packs/day: 0.25     Types: Cigarettes     Last attempt to quit: 2021     Years since quittin.5    Smokeless tobacco: Never   Substance Use Topics    Alcohol use: No     Comment: quit drinking one month prior         Marielena Wild LPN   This is the Subsequent Medicare Annual Wellness Exam, performed 12 months or more after the Initial AWV or the last Subsequent AWV    I have reviewed the patient's medical history in detail and updated the computerized patient record. Assessment/Plan   Education and counseling provided:  Are appropriate based on today's review and evaluation    1. Medicare annual wellness visit, subsequent  -     [place] TOBACCO USE CESSATION INTERMEDIATE 3-10 MINUTES  2. Tobacco abuse  -     [place] TOBAC[place] USE CESSATION INTERMEDIATE 3-10 MINUTES  3. Essential hypertension  -     HEMOGLOBIN A1C WITH EAG; Future  -     CBC W/O DIFF; Future  -     METABOLIC PANEL, COMPREHENSIVE; Future  -     MICROALBUMIN, UR, RAND W/ MICROALB/CREAT RATIO; Future  -     LIPID PANEL; Future  4. PUD (peptic ulcer disease)  5. Psoriasis  6. Fibromyalgia  -     gabapentin (NEURONTIN) 800 mg tablet; TAKE 1 TABLET BY MOUTH 4 TIMES DAILY . MAXIMUM DAILY AMOUNT / 4 TABLETS, Normal, Disp-360 Tablet, R-2  -     HEMOGLOBIN A1C WITH EAG; Future  -     CBC W/O DIFF; Future  -     METABOLIC PANEL, COMPREHENSIVE; Future  -     MICROALBUMIN, UR, RAND W/ MICROALB/CREAT RATIO; Future  -     LIPID PANEL; Future  7.  Neuropathy  -     gabapentin (NEURONTIN) 800 mg tablet; TAKE 1 TABLET BY MOUTH 4 TIMES DAILY Gamino Hidden MAXIMUM DAILY AMOUNT / 4 TABLETS, Normal, Disp-360 Tablet, R-2  -     HEMOGLOBIN A1C WITH EAG; Future  -     CBC W/O DIFF; Future  -     METABOLIC PANEL, COMPREHENSIVE; Future  -     MICROALBUMIN, UR, RAND W/ MICROALB/CREAT RATIO; Future  -     LIPID PANEL; Future  8. Encounter for screening mammogram for malignant neoplasm of breast  -     [facility] 3D JEVON W MAMMO BI SCREENING INCL CAD; Future  9. Age related osteoporosis, unspecified pathological fracture presence  -     DEXA BONE DENSITY STUDY AXIAL; Future  10. Postmenopausal  -     DEXA BONE DENSITY STUDY AXIAL; Future  11. Impaired fasting glucose  -     HEMOGLOBIN A1C WITH EAG; Future  -     CBC W/O DIFF; Future  -     METABOLIC PANEL, COMPREHENSIVE; Future  -     MICROALBUMIN, UR, RAND W/ MICROALB/CREAT RATIO; Future  -     LIPID PANEL; Future  12. Advanced directives, counseling/discussion  -     FULL CODE  -     ADVANCE CARE PLANNING FIRST 30 MINS       Depression Risk Factor Screening:     3 most recent PHQ Screens 2/6/2023   PHQ Not Done -   Little interest or pleasure in doing things Not at all   Feeling down, depressed, irritable, or hopeless Not at all   Total Score PHQ 2 0       Alcohol & Drug Abuse Risk Screen   Do you average more than 1 drink per night or more than 7 drinks a week?: No  On any one occasion in the past three months have you had more than 3 drinks containing alcohol?: No            Functional Ability and Level of Safety:   Hearing:  Hearing: Patient reports hearing is good      Activities of Daily Living: The home contains: handrails, grab bars  Functional ADLs: Patient does total self care     Ambulation:  Patient ambulates: with difficulty  How far the patient can walk with difficulty: 500 ft  Walking is difficult due to: pain  Walking aids: walker     Fall Risk:  Fall Risk Assessment, last 12 mths 7/14/2021   Able to walk? Yes   Fall in past 12 months? 0   Do you feel unsteady?  0   Are you worried about falling 0     Abuse Screen:  Do you ever feel afraid of your partner?: (P) No  Are you in a relationship with someone who physically or mentally threatens you?: (P) No  Is it safe for you to go home?: (P) Yes        Cognitive Screening   Has your family/caregiver stated any concerns about your memory?: No    Cognitive Screening: Normal - Verbal Fluency Test    Health Maintenance Due     Health Maintenance Due   Topic Date Due    COVID-19 Vaccine (4 - Booster for Amilcar Rodriguez series) 03/17/2022    Flu Vaccine (1) 08/01/2022       Patient Care Team   Patient Care Team:  Kelsy Moreira MD as PCP - General (Family Medicine)  Kelsy Moreira MD as PCP [facility]  Jeff Barnett MD as Surgeon (General Surgery)    History     Patient Active Problem List   Diagnosis Code    PUD (peptic ulcer disease) K27.9    Alcohol abuse F10.10    Essential hypertension I10    Impaired cognition R41.89    Anxiety F41.9    PTSD (post-traumatic stress disorder) F43.10    Cannabis abuse F12.10    Non-compliance with treatment Z91.199    Personality disorder ([facility] F60.9    Fibromyalgia M79.7    Neuropathy G62.9    S/P TKR (total knee replacement) using cement, right Z96.651    Psoriasis L40.9    Bipolar 2 disorder ([address] F31.81     Past Medical History:   Diagnosis Date    Anxiety     Bipolar 2 disorder ([facility] [address] 09/2018    Blurred vision     COVID-19 vaccine series completed     Fibromyalgia     Hemorrhoids     Hepatitis C virus infection cured after antiviral drug therapy     History of bleeding peptic ulcer 2016 & 2017    History of smoking     Psoriasis 2/10/2022    PTSD (post-traumatic stress disorder)     Stress       Past Surgical History:   Procedure Laterality Date    HX BREAST REDUCTION  08/30/2020    HX CHOLECYSTECTOMY      HX GASTRIC BYPASS       Current Outpatient Medications   Medication Sig Dispense Refill    gabapentin (NEURONTIN) 800 mg tablet TAKE 1 TABLET BY MOUTH 4 TIMES DAILY .  MAXIMUM DAILY AMOUNT / 4 TABLETS 360 Tablet 2    ferrous sulfate (SLOW FE) 142 mg (45 mg iron) ER tablet Take 1 Tablet by mouth Before breakfast and dinner. 90 Tablet 3    multivitamin (ONE A DAY) tablet Take 2 Tablets by mouth daily. 180 Tablet 4    DULoxetine (CYMBALTA) 30 mg capsule       QUEtiapine (SEROquel) 50 mg tablet Take 50 mg by mouth daily. QUEtiapine SR (SEROquel XR) 50 mg sr tablet Take 100 mg by mouth daily. Patient takes 2 tablets once daily. amLODIPine (NORVASC) 10 mg tablet Take 1 Tablet by mouth daily. 90 Tablet 0    metoprolol succinate (TOPROL-XL) 200 mg XL tablet Take 1 Tablet by mouth daily. 90 Tablet 0    methocarbamoL (ROBAXIN) 500 mg tablet Take 1 Tablet by mouth four (4) times daily. 40 Tablet 2    ibuprofen (MOTRIN) 600 mg tablet Take 1 Tablet by mouth every six (6) hours as needed for Pain. 180 Tablet 1    divalproex DR (DEPAKOTE) 250 mg tablet 250 mg two (2) times a day. Allergies   Allergen Reactions    Tizanidine Other (comments)       Family History   Problem Relation Age of Onset    No Known Problems Mother     Anesth Problems Neg Hx      Social History     Tobacco Use    Smoking status: Every Day     Packs/day: 0.25     Types: Cigarettes     Last attempt to quit: 2021     Years since quittin.5    Smokeless tobacco: Never   Substance Use Topics    Alcohol use: No     Comment: quit drinking one month prior       Greer Olive, was evaluated through a synchronous (real-time) audio-video encounter. The patient (or guardian if applicable) is aware that this is a billable service, which includes applicable co-pays. This Virtual Visit was conducted with patient's (and/or legal guardian's) consent. The visit was conducted pursuant to the emergency declaration under the [facility] authority and the [employer] Act. Patient identification was verified, and a caregiver was present when appropriate.   The patient was located at: Home: [address] [facility] [address]  The provider was located at: Home: [address] [place] [address], [facility]    Yazmin Payan MD  [facility]  02/06/23 11:39 AM      Jeannie Haley is a 81 y.o. female who was seen by synchronous (real-time) audio-video technology on 2/6/2023. Consent: Jeannie Haley, who was seen by synchronous (real-time) audio-video technology, and/or her healthcare decision maker, is aware that this patient-initiated, Telehealth encounter on 2/6/2023 is a billable service, with coverage as determined by her insurance carrier. She is aware that she may receive a bill and has provided verbal consent to proceed: Yes. Assessment & Plan:   1. Medicare annual wellness visit, subsequent  Done today  3-10 min spent in cessation counseling  Recommend cut back, set quit date, consider supplement nicotine if needed   Recommend accountability олег   - [place] TOBACCO USE CESSATION INTERMEDIATE 3-10 MINUTES    2. Tobacco abuse  As noted  - [place] TOBACCO USE CESSATION INTERMEDIATE 3-10 MINUTES    3. Essential hypertension  Bp at or near goal per her report  Routine labs  - HEMOGLOBIN A1C WITH EAG; Future  - CBC W/O DIFF; Future  - METABOLIC PANEL, COMPREHENSIVE; Future  - MICROALBUMIN, UR, RAND W/ MICROALB/CREAT RATIO; Future  - LIPID PANEL; Future    4. PUD (peptic ulcer disease)  Stable, diet controlled    5. Psoriasis  Stable, no exacerabtion    6. Fibromyalgia  Recommend gabapentin to continue  Labs per orders  Healthy habits  - gabapentin (NEURONTIN) 800 mg tablet; TAKE 1 TABLET BY MOUTH 4 TIMES DAILY . MAXIMUM DAILY AMOUNT / 4 TABLETS  Dispense: 360 Tablet; Refill: 2  - HEMOGLOBIN A1C WITH EAG; Future  - CBC W/O DIFF; Future  - METABOLIC PANEL, COMPREHENSIVE; Future  - MICROALBUMIN, UR, RAND W/ MICROALB/CREAT RATIO; Future  - LIPID PANEL; Future    7. Neuropathy  As aobve  - gabapentin (NEURONTIN) 800 mg tablet; TAKE 1 TABLET BY MOUTH 4 TIMES DAILY .  MAXIMUM DAILY AMOUNT / 4 TABLETS  Dispense: 360 Tablet; Refill: 2  - HEMOGLOBIN A1C WITH EAG; Future  - CBC W/O DIFF; Future  - METABOLIC PANEL, COMPREHENSIVE; Future  - MICROALBUMIN, UR, RAND W/ MICROALB/CREAT RATIO; Future  - LIPID PANEL; Future    8. Encounter for screening mammogram for malignant neoplasm of breast  routine  - [facility] 3D JEVON W MAMMO BI SCREENING INCL CAD; Future    9. Age related osteoporosis, unspecified pathological fracture presence  Routine, needs recheck  - DEXA BONE DENSITY STUDY AXIAL; Future    10. Postmenopausal  As above  - DEXA BONE DENSITY STUDY AXIAL; Future    11. Impaired fasting glucose  Routine fasting labs  - HEMOGLOBIN A1C WITH EAG; Future  - CBC W/O DIFF; Future  - METABOLIC PANEL, COMPREHENSIVE; Future  - MICROALBUMIN, UR, RAND W/ MICROALB/CREAT RATIO; Future  - LIPID PANEL; Future    12. Advanced directives, counseling/discussion  Reviewed today, documented, see notes  - FULL CODE  - ADVANCE CARE PLANNING FIRST 27 MINS          Pt was counseled on risks, benefits and alternatives of treatment options. All questions were asked and answered and the patient was agreeable with the treatment plan as outlined. Subjective:   Miguel Mayberry is a 81 y.o. female who was seen for Annual Wellness Visit      Tobacco use:  Smoking about 2 pack / month  She's leaving the country in may, she's going to visit her son in [facility] and she will try to quit then    HTN: patient reports stable on medications. No chest pain, sob, headache, blurred vision, leg swelling, diaphoresis, falls. Compliant with medications as prescribed. is checking blood pressures at home (in the morning) and reports range is 120-130/80s. No significant hyper or hypotensive episodes that the patient reports today.     On gabapentin for neuropathy and fibromyalgia, she says she was having chronic itching and the gabapentin seems to help with that  She thinks this is a trauma response for her    She has a hx of gastric bypass, compliant with her vitamins    Continues to see psychiatry, Dr Gustavo Hale [facility]  She tells me mood is feeling controlled and stable, works hard on self care. Continues in therapy, she goes to [facility] as well. She goes to  as well she is telling me. Psoriasis: patient tells me that is stable for her at this time, she is doing ok    Hx PUD: she tells me no recent problems, she controls symptoms through dietary changes    S/p R Knee replacement: tells me this is going fine for her  Medications, allergies, PMH, PSH, SOCH, [facility] reviewed and updated per routine protocol, see chart for review and changes if not noted here. ROS  A 12 point review of systems was negative except as noted here or in the HPI.     Objective:   Vital Signs: (As obtained by patient/caregiver at home)  Patient-Reported Vitals 2/6/2023   Patient-Reported Weight 160lb   Patient-Reported Height -   Patient-Reported Pulse 63   Patient-Reported Temperature -   Patient-Reported Systolic  366   Patient-Reported Diastolic 87        [INSTRUCTIONS:  \"[x]\" Indicates a positive item  \"[]\" Indicates a negative item  -- DELETE ALL ITEMS NOT EXAMINED]    Constitutional: [x] Appears well-developed and well-nourished [x] No apparent distress      [] Abnormal -     Mental status: [x] Alert and awake  [x] Oriented to person/place/time [x] Able to follow commands    [] Abnormal -     Eyes:   EOM    [x]  Normal    [] Abnormal -   Sclera  [x]  Normal    [] Abnormal -          Discharge [x]  None visible   [] Abnormal -     HENT: [x] Normocephalic, atraumatic  [] Abnormal -   [x] Mouth/Throat: Mucous membranes are moist    External Ears [x] Normal  [] Abnormal -    Neck: [x] No visualized mass [] Abnormal -     Pulmonary/Chest: [x] Respiratory effort normal   [x] No visualized signs of difficulty breathing or respiratory distress        [] Abnormal -      Musculoskeletal:   [] Normal gait with no signs of ataxia         [x] Normal range of motion of neck [] Abnormal -     Neurological:        [x] No Facial Asymmetry (Cranial nerve 7 motor function) (limited exam due to video visit)          [x] No gaze palsy        [] Abnormal -          Skin:        [x] No significant exanthematous lesions or discoloration noted on facial skin         [] Abnormal -            Psychiatric:       [x] Normal Affect [] Abnormal -        [x] No Hallucinations    Other pertinent observable physical exam findings:seated, wearing glasses, no distress, non toxic    We discussed the expected course, resolution and complications of the diagnosis(es) in detail. Medication risks, benefits, costs, interactions, and alternatives were discussed as indicated. I advised her to contact the office if her condition worsens, changes or fails to improve as anticipated. She expressed understanding with the diagnosis(es) and plan. Caitlin Stack is a 81 y.o. female who was evaluated by a video visit encounter for concerns as above. Patient identification was verified prior to start of the visit. A caregiver was present when appropriate. Due to this being a TeleHealth encounter (During GIPKA-98 public health emergency), evaluation of the following organ systems was limited: Vitals/Constitutional/EENT/Resp/CV/GI//MS/Neuro/Skin/Heme-Lymph-Imm. Pursuant to the emergency declaration under the [facility] waiver authority and the [employer] Act, this Virtual  Visit was conducted, with patient's (and/or legal guardian's) consent, to reduce the patient's risk of exposure to COVID-19 and provide necessary medical care. Services were provided through a video synchronous discussion virtually to substitute for in-person clinic visit. Patient and provider were located at their individual homes.       Shannon Watters MD  [facility]  02/06/23 11:40 AM     Portions of this note may have been populated using smart dictation software and may have \"sounds-like\" errors present.

## 2023-02-06 NOTE — ACP (ADVANCE CARE PLANNING)
Advance Care Planning     Advance Care Planning (ACP) Physician/NP/PA Conversation      Date of Conversation: 2/6/2023  Conducted with: Patient with Decision Making Capacity    Healthcare Decision Maker:     Primary Decision Maker: Almaz Velazquez - [phone]  Click here to complete [address] including selection of the Healthcare Decision Maker Relationship (ie \"Primary\")      Today we documented Decision Maker(s) consistent with Legal Next of Kin hierarchy. Care Preferences:    Hospitalization: \"If your health worsens and it becomes clear that your chance of recovery is unlikely, what would be your preference regarding hospitalization? \"  The patient would prefer hospitalization. Ventilation: \"If you were unable to breathe on your own and your chance of recovery was unlikely, what would be your preference about the use of a ventilator (breathing machine) if it was available to you? \"   The patient would desire the use of a ventilator. Resuscitation: \"In the event your heart stopped as a result of an underlying serious health condition, would you want attempts to be made to restart your heart, or would you prefer a natural death? \"   Yes, attempt to resuscitate.     Additional topics discussed: treatment goals, benefit/burden of treatment options, ventilation preferences, hospitalization preferences, and resuscitation preferences    Conversation Outcomes / Follow-Up Plan:   ACP in process - information provided, considering goals and options  Reviewed DNR/DNI and patient elects Full Code (Attempt Resuscitation)     Length of Voluntary ACP Conversation in minutes:  16 minutes    Colby Zamarripa MD

## 2023-02-07 DIAGNOSIS — K90.89 OTHER SPECIFIED INTESTINAL MALABSORPTION: ICD-10-CM

## 2023-02-07 DIAGNOSIS — M79.7 FIBROMYALGIA: ICD-10-CM

## 2023-02-07 DIAGNOSIS — I10 ESSENTIAL HYPERTENSION: ICD-10-CM

## 2023-02-07 DIAGNOSIS — G62.9 NEUROPATHY: ICD-10-CM

## 2023-02-07 RX ORDER — GABAPENTIN 800 MG/1
TABLET ORAL
Qty: 360 TABLET | Refills: 2 | OUTPATIENT
Start: 2023-02-07

## 2023-02-07 RX ORDER — METOPROLOL SUCCINATE 200 MG/1
200 TABLET, EXTENDED RELEASE ORAL DAILY
Qty: 90 TABLET | Refills: 0 | OUTPATIENT
Start: 2023-02-07

## 2023-02-07 RX ORDER — MULTIVITAMIN
2 TABLET ORAL DAILY
Qty: 180 TABLET | Refills: 4 | OUTPATIENT
Start: 2023-02-07

## 2023-02-07 RX ORDER — METHOCARBAMOL 500 MG/1
500 TABLET, FILM COATED ORAL 4 TIMES DAILY
Qty: 40 TABLET | Refills: 2 | Status: SHIPPED | OUTPATIENT
Start: 2023-02-07

## 2023-02-07 RX ORDER — AMLODIPINE BESYLATE 10 MG/1
10 TABLET ORAL DAILY
Qty: 90 TABLET | Refills: 0 | OUTPATIENT
Start: 2023-02-07

## 2023-02-07 NOTE — TELEPHONE ENCOUNTER
Pt's insurance ( [employer] ) will no longer be taken here at [facility] and she has found a new PCP that will see her but didn't have an appt until may of this year.  She is requesting refills of all her meds to get her through till she can see the new Dr.     Gabapentin 800 mg  Amlodipine 10 mg  Metoprolol 200 mg  Slow  mg   Multivitamin 2 tabs/day  Robaxin 500 mg      JR

## 2023-02-16 ENCOUNTER — TELEPHONE (OUTPATIENT)
Dept: FAMILY MEDICINE CLINIC | Age: 73
End: 2023-02-16

## 2023-02-16 NOTE — TELEPHONE ENCOUNTER
Pt is going to have lab work done and wants to have a lab order for lyme disease put in, thinks that could be the answer to the symptoms she has been having.  She wants to do labs tomorrow    Shayy Krishnamurthy [phone]

## 2023-02-21 ENCOUNTER — VIRTUAL VISIT (OUTPATIENT)
Dept: FAMILY MEDICINE CLINIC | Age: 73
End: 2023-02-21
Payer: MEDICARE

## 2023-02-21 DIAGNOSIS — M25.50 ARTHRALGIA, UNSPECIFIED JOINT: Primary | ICD-10-CM
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
## 2023-02-21 PROCEDURE — G8417 CALC BMI ABV UP PARAM F/U: HCPCS | Performed by: FAMILY MEDICINE

## 2023-02-21 PROCEDURE — G8427 DOCREV CUR MEDS BY ELIG CLIN: HCPCS | Performed by: FAMILY MEDICINE

## 2023-02-21 PROCEDURE — G8536 NO DOC ELDER MAL SCRN: HCPCS | Performed by: FAMILY MEDICINE

## 2023-02-21 PROCEDURE — G9899 SCRN MAM PERF RSLTS DOC: HCPCS | Performed by: FAMILY MEDICINE

## 2023-02-21 PROCEDURE — G8399 PT W/DXA RESULTS DOCUMENT: HCPCS | Performed by: FAMILY MEDICINE

## 2023-02-21 PROCEDURE — 1123F ACP DISCUSS/DSCN MKR DOCD: CPT | Performed by: FAMILY MEDICINE

## 2023-02-21 PROCEDURE — G9717 DOC PT DX DEP/BP F/U NT REQ: HCPCS | Performed by: FAMILY MEDICINE

## 2023-02-21 PROCEDURE — 1090F PRES/ABSN URINE INCON ASSESS: CPT | Performed by: FAMILY MEDICINE

## 2023-02-21 PROCEDURE — 99213 OFFICE O/P EST LOW 20 MIN: CPT | Performed by: FAMILY MEDICINE

## 2023-02-21 PROCEDURE — 1101F PT FALLS ASSESS-DOCD LE1/YR: CPT | Performed by: FAMILY MEDICINE

## 2023-02-21 PROCEDURE — 3017F COLORECTAL CA SCREEN DOC REV: CPT | Performed by: FAMILY MEDICINE

## 2023-02-21 RX ORDER — CHLORHEXIDINE GLUCONATE 1.2 MG/ML
RINSE ORAL
COMMUNITY
Start: 2022-12-27 | End: 2023-02-21

## 2023-02-21 RX ORDER — CEFUROXIME AXETIL 250 MG/1
TABLET ORAL
COMMUNITY
Start: 2023-02-20

## 2023-02-21 NOTE — PROGRESS NOTES
Chief Complaint   Patient presents with    Follow-up     Discuss testing for possible lyme disease. 1. Have you been to the ER, urgent care clinic since your last visit? Hospitalized since your last visit? Yes, 02/202/23 [facility], UTI. 2. Have you seen or consulted any other health care providers outside of the [address] [place] [address] since your last visit? Include any pap smears or colon screening.  No

## 2023-02-21 NOTE — PROGRESS NOTES
Evelin Holman is a 81 y.o. female who was seen by synchronous (real-time) audio-video technology on 2023. Assessment & Plan:   Diagnoses and all orders for this visit:    1. Arthralgia, unspecified joint  -     LYME AB, IGG & IGM BY WB; Future    2. Tick bite, unspecified site, initial encounter  -     LYME AB, IGG & IGM BY WB; Future      Labs per orders. Continue current plans. Follow-up and Dispositions    Return if symptoms worsen or fail to improve. Reviewed plan of care. Patient has provided input and agrees with goals. CPT Codes 67434-70730 for Established Patients may apply to this Telehealth Visit      Subjective:   Evelin Holman was seen for Follow-up (Discuss testing for possible lyme disease.)      Patient presents with: Follow-up: Discuss testing for possible lyme disease. She is requesting this due to fatigue, chronic joint pain and a rash, that I felt was eczema. Also, she has RLS syndrome. She has had a lot of tick bites. Also, her dog  of Lyme disease and she was exposed to his ticks. Review of Systems   Constitutional:  Positive for malaise/fatigue. Negative for chills and fever. Genitourinary:         Joint swelling   Skin:         No joint redness or warmth   Neurological:         No chronic tingling, weakness or numbness in the extremities. Objective:   /85, P 56    Physical Exam  Constitutional:       General: She is not in acute distress. Appearance: Normal appearance. Neurological:      Mental Status: She is alert and oriented to person, place, and time. Due to this being a TeleHealth evaluation, many elements of the physical examination are unable to be assessed. We discussed the expected course, resolution and complications of the diagnosis(es) in detail. Medication risks, benefits, costs, interactions, and alternatives were discussed as indicated.   I advised her to contact the office if her condition worsens, changes or fails to improve as anticipated. She expressed understanding with the diagnosis(es) and plan. Pursuant to the emergency declaration under the [facility], [facility] waiver authority and the [employer] Act, this Virtual  Visit was conducted, with patient's consent, to reduce the patient's risk of exposure to COVID-19 and provide continuity of care for an established patient. Services were provided through a video synchronous discussion virtually to substitute for in-person clinic visit.     Jazmyn Robles MD

## 2023-07-10 RX ORDER — AMLODIPINE BESYLATE 10 MG/1
TABLET ORAL
Qty: 90 TABLET | Refills: 2 | OUTPATIENT
Start: 2023-07-10

## 2023-07-10 RX ORDER — AMLODIPINE BESYLATE 10 MG/1
TABLET ORAL
Qty: 90 TABLET | Refills: 1 | Status: SHIPPED | OUTPATIENT
Start: 2023-07-10

## 2023-09-18 DIAGNOSIS — G62.9 NEUROPATHY: Primary | ICD-10-CM

## 2023-09-19 ENCOUNTER — TELEPHONE (OUTPATIENT)
Facility: CLINIC | Age: 73
End: 2023-09-19

## 2023-09-19 NOTE — TELEPHONE ENCOUNTER
----- Message from Princess Morgan sent at 9/18/2023  2:43 PM EDT -----  Subject: Refill Request    QUESTIONS  Name of Medication? gabapentin (NEURONTIN) 800 MG tablet  Patient-reported dosage and instructions? daily  How many days do you have left? 0  Preferred Pharmacy? [address] [facility] phone number (if available)? [phone]  ---------------------------------------------------------------------------  --------------,  Name of Medication? metoprolol succinate (TOPROL XL) 200 MG extended   release tablet  Patient-reported dosage and instructions? daily  How many days do you have left? 0  Preferred Pharmacy? [address] ([facility] phone number (if available)? [phone]  ---------------------------------------------------------------------------  --------------,  Name of Medication? amLODIPine (NORVASC) 10 MG tablet  Patient-reported dosage and instructions? daily  How many days do you have left? 0  Preferred Pharmacy? [address] [facility] [facility] phone number (if available)? [phone]  ---------------------------------------------------------------------------  --------------  CALL BACK INFO  What is the best way for the office to contact you? OK to leave message on   voicemail  Preferred Call Back Phone Number? [phone]  ---------------------------------------------------------------------------  --------------  SCRIPT ANSWERS  Relationship to Patient?  Self

## 2023-09-20 RX ORDER — GABAPENTIN 800 MG/1
800 TABLET ORAL 4 TIMES DAILY
Qty: 360 TABLET | Refills: 1 | Status: SHIPPED | OUTPATIENT
Start: 2023-09-20 | End: 2024-03-18

## 2023-09-20 RX ORDER — AMLODIPINE BESYLATE 10 MG/1
TABLET ORAL
Qty: 90 TABLET | Refills: 1 | Status: SHIPPED | OUTPATIENT
Start: 2023-09-20

## 2023-12-05 ENCOUNTER — OFFICE VISIT (OUTPATIENT)
Age: 73
End: 2023-12-05
Payer: MEDICARE

## 2023-12-05 VITALS
HEIGHT: 60 IN | DIASTOLIC BLOOD PRESSURE: 70 MMHG | RESPIRATION RATE: 18 BRPM | BODY MASS INDEX: 28.07 KG/M2 | HEART RATE: 61 BPM | TEMPERATURE: 98.2 F | OXYGEN SATURATION: 94 % | SYSTOLIC BLOOD PRESSURE: 110 MMHG | WEIGHT: 143 LBS

## 2023-12-05 DIAGNOSIS — R10.33 ABDOMINAL PAIN, PERIUMBILICAL: Primary | ICD-10-CM

## 2023-12-05 DIAGNOSIS — R63.4 WEIGHT LOSS, ABNORMAL: ICD-10-CM

## 2023-12-05 DIAGNOSIS — R13.19 ESOPHAGEAL DYSPHAGIA: ICD-10-CM

## 2023-12-05 PROCEDURE — 3078F DIAST BP <80 MM HG: CPT | Performed by: SURGERY

## 2023-12-05 PROCEDURE — 99203 OFFICE O/P NEW LOW 30 MIN: CPT | Performed by: SURGERY

## 2023-12-05 PROCEDURE — 1123F ACP DISCUSS/DSCN MKR DOCD: CPT | Performed by: SURGERY

## 2023-12-05 PROCEDURE — 3074F SYST BP LT 130 MM HG: CPT | Performed by: SURGERY

## 2023-12-05 RX ORDER — PANTOPRAZOLE SODIUM 40 MG/1
40 TABLET, DELAYED RELEASE ORAL
Qty: 60 TABLET | Refills: 5 | Status: SHIPPED | OUTPATIENT
Start: 2023-12-05

## 2023-12-05 ASSESSMENT — PATIENT HEALTH QUESTIONNAIRE - PHQ9
4. FEELING TIRED OR HAVING LITTLE ENERGY: 0
1. LITTLE INTEREST OR PLEASURE IN DOING THINGS: 0
SUM OF ALL RESPONSES TO PHQ QUESTIONS 1-9: 0
3. TROUBLE FALLING OR STAYING ASLEEP: 0
6. FEELING BAD ABOUT YOURSELF - OR THAT YOU ARE A FAILURE OR HAVE LET YOURSELF OR YOUR FAMILY DOWN: 0
5. POOR APPETITE OR OVEREATING: 0
SUM OF ALL RESPONSES TO PHQ QUESTIONS 1-9: 0
9. THOUGHTS THAT YOU WOULD BE BETTER OFF DEAD, OR OF HURTING YOURSELF: 0
7. TROUBLE CONCENTRATING ON THINGS, SUCH AS READING THE NEWSPAPER OR WATCHING TELEVISION: 0
SUM OF ALL RESPONSES TO PHQ9 QUESTIONS 1 & 2: 0
10. IF YOU CHECKED OFF ANY PROBLEMS, HOW DIFFICULT HAVE THESE PROBLEMS MADE IT FOR YOU TO DO YOUR WORK, TAKE CARE OF THINGS AT HOME, OR GET ALONG WITH OTHER PEOPLE: 0
2. FEELING DOWN, DEPRESSED OR HOPELESS: 0
8. MOVING OR SPEAKING SO SLOWLY THAT OTHER PEOPLE COULD HAVE NOTICED. OR THE OPPOSITE, BEING SO FIGETY OR RESTLESS THAT YOU HAVE BEEN MOVING AROUND A LOT MORE THAN USUAL: 0

## 2023-12-12 DIAGNOSIS — G62.9 NEUROPATHY: ICD-10-CM

## 2023-12-12 RX ORDER — GABAPENTIN 800 MG/1
TABLET ORAL
Qty: 360 TABLET | Refills: 0 | Status: SHIPPED | OUTPATIENT
Start: 2023-12-12 | End: 2024-03-11

## 2023-12-12 NOTE — TELEPHONE ENCOUNTER
PDMP Monitoring:    Last PDMP Jules Sneed as Reviewed:  Review User Review Instant Review Result   MARIN GUERIN 12/12/2023  5:17 PM Reviewed PDMP [1]     [unfilled]  Urine Drug Screenings (1 yr)    No resulted procedures found.        Medication Contract and Consent for Opioid Use Documents Filed        No documents found

## 2024-01-08 RX ORDER — METOPROLOL SUCCINATE 200 MG/1
200 TABLET, EXTENDED RELEASE ORAL DAILY
Qty: 30 TABLET | Refills: 0 | Status: SHIPPED | OUTPATIENT
Start: 2024-01-08

## 2024-01-15 ENCOUNTER — ANESTHESIA (OUTPATIENT)
Facility: HOSPITAL | Age: 74
End: 2024-01-15
Payer: MEDICARE

## 2024-01-15 ENCOUNTER — ANESTHESIA EVENT (OUTPATIENT)
Facility: HOSPITAL | Age: 74
End: 2024-01-15
Payer: MEDICARE

## 2024-01-15 ENCOUNTER — HOSPITAL ENCOUNTER (OUTPATIENT)
Facility: HOSPITAL | Age: 74
Setting detail: OUTPATIENT SURGERY
Discharge: HOME | End: 2024-01-15
Attending: INTERNAL MEDICINE | Admitting: INTERNAL MEDICINE
Payer: MEDICARE

## 2024-01-15 VITALS
DIASTOLIC BLOOD PRESSURE: 71 MMHG | WEIGHT: 132 LBS | BODY MASS INDEX: 24.29 KG/M2 | TEMPERATURE: 97.5 F | HEIGHT: 62 IN | SYSTOLIC BLOOD PRESSURE: 126 MMHG | OXYGEN SATURATION: 96 % | HEART RATE: 85 BPM | RESPIRATION RATE: 21 BRPM

## 2024-01-15 PROCEDURE — 3700000000 HC ANESTHESIA ATTENDED CARE: Performed by: INTERNAL MEDICINE

## 2024-01-15 PROCEDURE — 6360000002 HC RX W HCPCS: Performed by: NURSE ANESTHETIST, CERTIFIED REGISTERED

## 2024-01-15 PROCEDURE — 2500000003 HC RX 250 WO HCPCS: Performed by: NURSE ANESTHETIST, CERTIFIED REGISTERED

## 2024-01-15 PROCEDURE — 7100000010 HC PHASE II RECOVERY - FIRST 15 MIN: Performed by: INTERNAL MEDICINE

## 2024-01-15 PROCEDURE — 3600007502: Performed by: INTERNAL MEDICINE

## 2024-01-15 PROCEDURE — 2580000003 HC RX 258: Performed by: NURSE ANESTHETIST, CERTIFIED REGISTERED

## 2024-01-15 PROCEDURE — 3700000001 HC ADD 15 MINUTES (ANESTHESIA): Performed by: INTERNAL MEDICINE

## 2024-01-15 PROCEDURE — 7100000011 HC PHASE II RECOVERY - ADDTL 15 MIN: Performed by: INTERNAL MEDICINE

## 2024-01-15 PROCEDURE — 3600007512: Performed by: INTERNAL MEDICINE

## 2024-01-15 RX ORDER — SODIUM CHLORIDE 9 MG/ML
INJECTION, SOLUTION INTRAVENOUS CONTINUOUS PRN
Status: DISCONTINUED | OUTPATIENT
Start: 2024-01-15 | End: 2024-01-15 | Stop reason: SDUPTHER

## 2024-01-15 RX ORDER — LIDOCAINE HYDROCHLORIDE 20 MG/ML
INJECTION, SOLUTION EPIDURAL; INFILTRATION; INTRACAUDAL; PERINEURAL PRN
Status: DISCONTINUED | OUTPATIENT
Start: 2024-01-15 | End: 2024-01-15 | Stop reason: SDUPTHER

## 2024-01-15 RX ORDER — SODIUM CHLORIDE 0.9 % (FLUSH) 0.9 %
5-40 SYRINGE (ML) INJECTION EVERY 12 HOURS SCHEDULED
Status: DISCONTINUED | OUTPATIENT
Start: 2024-01-15 | End: 2024-01-15 | Stop reason: HOSPADM

## 2024-01-15 RX ORDER — SODIUM CHLORIDE 9 MG/ML
25 INJECTION, SOLUTION INTRAVENOUS PRN
Status: DISCONTINUED | OUTPATIENT
Start: 2024-01-15 | End: 2024-01-15 | Stop reason: HOSPADM

## 2024-01-15 RX ORDER — PHENYLEPHRINE HCL IN 0.9% NACL 0.4MG/10ML
SYRINGE (ML) INTRAVENOUS PRN
Status: DISCONTINUED | OUTPATIENT
Start: 2024-01-15 | End: 2024-01-15 | Stop reason: SDUPTHER

## 2024-01-15 RX ORDER — SODIUM CHLORIDE 9 MG/ML
INJECTION, SOLUTION INTRAVENOUS CONTINUOUS
Status: DISCONTINUED | OUTPATIENT
Start: 2024-01-15 | End: 2024-01-15 | Stop reason: HOSPADM

## 2024-01-15 RX ORDER — SODIUM CHLORIDE 0.9 % (FLUSH) 0.9 %
5-40 SYRINGE (ML) INJECTION PRN
Status: DISCONTINUED | OUTPATIENT
Start: 2024-01-15 | End: 2024-01-15 | Stop reason: HOSPADM

## 2024-01-15 RX ADMIN — PROPOFOL 30 MG: 10 INJECTION, EMULSION INTRAVENOUS at 11:13

## 2024-01-15 RX ADMIN — PROPOFOL 50 MG: 10 INJECTION, EMULSION INTRAVENOUS at 11:21

## 2024-01-15 RX ADMIN — SODIUM CHLORIDE: 9 INJECTION, SOLUTION INTRAVENOUS at 11:09

## 2024-01-15 RX ADMIN — LIDOCAINE HYDROCHLORIDE 50 MG: 20 INJECTION, SOLUTION EPIDURAL; INFILTRATION; INTRACAUDAL; PERINEURAL at 11:12

## 2024-01-15 RX ADMIN — PROPOFOL 50 MG: 10 INJECTION, EMULSION INTRAVENOUS at 11:26

## 2024-01-15 RX ADMIN — PROPOFOL 50 MG: 10 INJECTION, EMULSION INTRAVENOUS at 11:12

## 2024-01-15 RX ADMIN — Medication 80 MCG: at 11:25

## 2024-01-15 RX ADMIN — PROPOFOL 50 MG: 10 INJECTION, EMULSION INTRAVENOUS at 11:17

## 2024-01-15 RX ADMIN — PROPOFOL 20 MG: 10 INJECTION, EMULSION INTRAVENOUS at 11:15

## 2024-01-15 ASSESSMENT — PAIN - FUNCTIONAL ASSESSMENT: PAIN_FUNCTIONAL_ASSESSMENT: 0-10

## 2024-01-15 ASSESSMENT — PAIN SCALES - GENERAL: PAINLEVEL_OUTOF10: 0

## 2024-01-15 NOTE — H&P
[facility]  [address]  [place] [address] [phone]                                History and Physical     NAME: Elizabeth Chinchilla   :  1950   MRN:  [id number]     HPI:  The patient was seen and examined.    Past Surgical History:   Procedure Laterality Date    BREAST REDUCTION SURGERY  2020    CHOLECYSTECTOMY      COLONOSCOPY      GASTRIC BYPASS SURGERY       Past Medical History:   Diagnosis Date    Anxiety     Bipolar 2 disorder (HCC) 2018    Blurred vision     COVID-19 vaccine series completed     Fibromyalgia     Hemorrhoids     Hepatitis C virus infection cured after antiviral drug therapy     History of bleeding peptic ulcer  &     History of smoking     Psoriasis 2/10/2022    PTSD (post-traumatic stress disorder)     Stress      Social History     Tobacco Use    Smoking status: Every Day     Current packs/day: 0.00     Types: Cigarettes     Last attempt to quit: 2021     Years since quittin.4    Smokeless tobacco: Never   Substance Use Topics    Alcohol use: No    Drug use: Not Currently     Allergies   Allergen Reactions    Tizanidine Other (See Comments)     Family History   Problem Relation Age of Onset    No Known Problems Mother     Anesth Problems Neg Hx      No current facility-administered medications for this encounter.         PHYSICAL EXAM:  General: WD, WN. Alert, cooperative, no acute distress    HEENT: NC, Atraumatic.  PERRLA, EOMI. Anicteric sclerae.  Lungs:  CTA Bilaterally. No Wheezing/Rhonchi/Rales.  Heart:  Regular  rhythm,  No murmur, No Rubs, No Gallops  Abdomen: Soft, Non distended, Non tender.  +Bowel sounds, no HSM  Extremities: No c/c/e  Neurologic:  CN 2-12 gi, Alert and oriented X 3.  No acute neurological distress   Psych:   Good insight. Not anxious nor agitated.    The heart, lungs and mental status were satisfactory for the administration of MAC sedation and for the procedure.      Mallampati score:

## 2024-01-15 NOTE — PROGRESS NOTES

## 2024-01-15 NOTE — ANESTHESIA POSTPROCEDURE EVALUATION
Department of Anesthesiology  Postprocedure Note    Patient: Elizabeth Chinchilla  MRN: [id number]  YOB: 1950  Date of evaluation: 1/15/2024    Procedure Summary       Date: 01/15/24 Room / Location: [facility] ENDO 06 / [facility] ENDOSCOPY    Anesthesia Start: 1109 Anesthesia Stop: 1131    Procedure: COLONOSCOPY (Lower GI Region) Diagnosis:       Tick bite, unspecified site, initial encounter      History of bariatric surgery      Malnutrition, unspecified type (HCC)      Abdominal bloating      Change in bowel habits      Rectal bleeding      Intestinal malabsorption, unspecified type      Marihuana dependence (HCC)      Tobacco abuse      (Tick bite, unspecified site, initial encounter [W57.XXXA])      (History of bariatric surgery [Z98.84])      (Malnutrition, unspecified type (HCC) [E46])      (Abdominal bloating [R14.0])      (Change in bowel habits [R19.4])      (Rectal bleeding [K62.5])      (Intestinal malabsorption, unspecified type [K90.9])      (Marihuana dependence (HCC) [F12.20])      (Tobacco abuse [Z72.0])    Surgeons: John iMrza MD Responsible Provider: Alma Delia Pacheco DO    Anesthesia Type: MAC ASA Status: 2            Anesthesia Type: No value filed.    Bernadette Phase I: Bernadette Score: 10    Bernadette Phase II: Bernadette Score: 10    Anesthesia Post Evaluation    Patient location during evaluation: PACU  Patient participation: complete - patient participated  Level of consciousness: awake and alert  Pain score: 0  Airway patency: patent  Nausea & Vomiting: no nausea and no vomiting  Cardiovascular status: blood pressure returned to baseline and hemodynamically stable  Respiratory status: room air  Hydration status: euvolemic  Pain management: adequate    No notable events documented.

## 2024-01-15 NOTE — DISCHARGE INSTRUCTIONS
[facility]  [address]  [place] [address]  [phone]                                  Elizabeth Chinchilla  [id number]  1950    COLON DISCHARGE INSTRUCTIONS    DISCOMFORT:  Redness at IV site- apply warm compress to area; if redness or soreness persist- contact your physician  There may be a slight amount of blood passed from the rectum  Gaseous discomfort- walking, belching will help relieve any discomfort      DIET:   High fiber diet.   - however -  remember your colon is empty and a heavy meal will produce gas.   Avoid these foods:  vegetables, fried / greasy foods, carbonated drinks for today  You may not  drink alcoholic beverages for at least 12 hours     ACTIVITY:  It is recommended that you spend the remainder of the day resting -  avoid any strenuous activity.  You may not operate a vehicle for 12 hours  You may not  engage in an occupation involving machinery or appliances for rest of today    Avoid making any critical decisions for at least 24 hour    CALL M.D.  ANY SIGN OF:   Increasing pain, nausea, vomiting  Abdominal distension (swelling)  New increased bleeding (oral or rectal)  Fever (chills)  Pain in chest area  Bloody discharge from nose or mouth  Shortness of breath    You may not  take any Advil, Aspirin, Ibuprofen, Motrin, Aleve, or Goody’s for 10 days, ONLY  Tylenol as needed for pain.    Post procedure diagnosis:   Internal hemorrhoids    Post-procedure recommendations:  -Repeat colonoscopy in 5 years.  -High fiber diet.     -Resume normal medication(s).     Follow-up Instructions:    Call Dr. Mirza for any questions or problems.     If we took a biopsy please call the office within 2 weeks to discuss your  pathology results. Telephone # [phone]

## 2024-01-15 NOTE — ANESTHESIA PRE PROCEDURE
12/05/23 110/70   04/25/22 113/65   07/14/21 105/67       NPO Status: Time of last liquid consumption: 0700                        Time of last solid consumption: 1200                        Date of last liquid consumption: 01/15/24                        Date of last solid food consumption: 01/13/24    BMI:   Wt Readings from Last 3 Encounters:   12/05/23 64.9 kg (143 lb)   04/25/22 63.5 kg (140 lb)   07/14/21 65.8 kg (145 lb)     There is no height or weight on file to calculate BMI.    CBC:   Lab Results   Component Value Date/Time    WBC 5.1 10/13/2021 02:08 PM    RBC 4.26 10/13/2021 02:08 PM    HGB 13.0 10/13/2021 02:08 PM    HCT 38.8 10/13/2021 02:08 PM    MCV 91.1 10/13/2021 02:08 PM    RDW 14.0 10/13/2021 02:08 PM     10/13/2021 02:08 PM       CMP:   Lab Results   Component Value Date/Time     04/25/2022 11:20 AM    K 4.3 04/25/2022 11:20 AM     04/25/2022 11:20 AM    CO2 28 04/25/2022 11:20 AM    BUN 16 04/25/2022 11:20 AM    CREATININE 0.67 04/25/2022 11:20 AM    GFRAA >60 04/25/2022 11:20 AM    AGRATIO 1.1 10/13/2021 02:08 PM    GLUCOSE 107 04/25/2022 11:20 AM    PROT 7.3 10/13/2021 02:08 PM    CALCIUM 10.5 04/25/2022 11:20 AM    BILITOT 0.4 10/13/2021 02:08 PM    ALKPHOS 76 10/13/2021 02:08 PM    AST 36 10/13/2021 02:08 PM    ALT 34 10/13/2021 02:08 PM       POC Tests: No results for input(s): \"POCGLU\", \"POCNA\", \"POCK\", \"POCCL\", \"POCBUN\", \"POCHEMO\", \"POCHCT\" in the last 72 hours.    Coags: No results found for: \"PROTIME\", \"INR\", \"APTT\"    HCG (If Applicable): No results found for: \"PREGTESTUR\", \"PREGSERUM\", \"HCG\", \"HCGQUANT\"     ABGs: No results found for: \"PHART\", \"PO2ART\", \"AMA3VVO\", \"POE0QFV\", \"BEART\", \"Y5SLEDIM\"     Type & Screen (If Applicable):  No results found for: \"LABABO\", \"LABRH\"    Drug/Infectious Status (If Applicable):  No results found for: \"HIV\", \"HEPCAB\"    COVID-19 Screening (If Applicable):   Lab Results   Component Value Date/Time    COVID19 Not detected

## 2024-01-15 NOTE — OP NOTE
[facility]  [address]  [place] [address] [phone]                              Colonoscopy Procedure Note      Indications:    Constipation     :  John Mirza MD    Surgical Assistant: Circulator: Juanis Denny RN  Endoscopy Technician: Kumar Pelletier    Implants: none    Referring Provider: Cindy Montgomery MD    Sedation:  MAC anesthesia    Procedure Details:  After informed consent was obtained with all risks and benefits of procedure explained and preoperative exam completed, the patient was taken to the endoscopy suite and placed in the left lateral decubitus position.  Upon sequential sedation as per above, a digital rectal exam was performed  And was normal.  The Olympus videocolonoscope  was inserted in the rectum and carefully advanced to the cecum, which was identified by the ileocecal valve and appendiceal orifice.  The quality of preparation was good.  The colonoscope was slowly withdrawn with careful evaluation between folds. Retroflexion in the rectum was performed and was normal..     Findings:   Rectum: no mucosal lesion appreciated  Grade 2 internal hemorrhoid(s);  Sigmoid: no mucosal lesion appreciated  Descending Colon: no mucosal lesion appreciated  Transverse Colon: no mucosal lesion appreciated  Ascending Colon: no mucosal lesion appreciated  Cecum: no mucosal lesion appreciated  Terminal Ileum: not intubated    Interventions:  none    Specimen Removed:  * No specimens in log *    Complications: None.     EBL:  None.    Recommendations:   -Repeat colonoscopy in 5 years.  -High fiber diet.     -Resume normal medication(s).     Discharge Disposition:  Home in the company of a  when able to ambulate.    John Mirza MD  1/15/2024  11:31 AM

## 2024-01-24 RX ORDER — METHOCARBAMOL 500 MG/1
500 TABLET, FILM COATED ORAL 4 TIMES DAILY
Qty: 40 TABLET | Refills: 1 | Status: SHIPPED | OUTPATIENT
Start: 2024-01-24

## 2024-01-31 ENCOUNTER — TELEMEDICINE (OUTPATIENT)
Facility: CLINIC | Age: 74
End: 2024-01-31
Payer: MEDICARE

## 2024-01-31 DIAGNOSIS — R73.01 IFG (IMPAIRED FASTING GLUCOSE): ICD-10-CM

## 2024-01-31 DIAGNOSIS — R79.0 LOW FERRITIN LEVEL: ICD-10-CM

## 2024-01-31 DIAGNOSIS — I10 ESSENTIAL (PRIMARY) HYPERTENSION: ICD-10-CM

## 2024-01-31 DIAGNOSIS — G62.9 POLYNEUROPATHY, UNSPECIFIED: ICD-10-CM

## 2024-01-31 DIAGNOSIS — F31.81 BIPOLAR 2 DISORDER (HCC): Primary | ICD-10-CM

## 2024-01-31 PROCEDURE — 1123F ACP DISCUSS/DSCN MKR DOCD: CPT | Performed by: FAMILY MEDICINE

## 2024-01-31 PROCEDURE — 99214 OFFICE O/P EST MOD 30 MIN: CPT | Performed by: FAMILY MEDICINE

## 2024-01-31 RX ORDER — AMLODIPINE BESYLATE 10 MG/1
10 TABLET ORAL DAILY
Qty: 90 TABLET | Refills: 0 | Status: SHIPPED | OUTPATIENT
Start: 2024-01-31

## 2024-01-31 RX ORDER — METOPROLOL SUCCINATE 200 MG/1
200 TABLET, EXTENDED RELEASE ORAL DAILY
Qty: 90 TABLET | Refills: 0 | Status: SHIPPED | OUTPATIENT
Start: 2024-01-31

## 2024-01-31 ASSESSMENT — PATIENT HEALTH QUESTIONNAIRE - PHQ9
SUM OF ALL RESPONSES TO PHQ QUESTIONS 1-9: 0
2. FEELING DOWN, DEPRESSED OR HOPELESS: 0
SUM OF ALL RESPONSES TO PHQ QUESTIONS 1-9: 0
SUM OF ALL RESPONSES TO PHQ QUESTIONS 1-9: 0
SUM OF ALL RESPONSES TO PHQ9 QUESTIONS 1 & 2: 0
1. LITTLE INTEREST OR PLEASURE IN DOING THINGS: 0
SUM OF ALL RESPONSES TO PHQ QUESTIONS 1-9: 0

## 2024-01-31 NOTE — PROGRESS NOTES
Chief Complaint   Patient presents with    Hypertension     Follow up.    Discuss Medications     Side effects from seroquel. Would also like labs ordered.     1. Have you been to the ER, urgent care clinic since your last visit?  Hospitalized since your last visit?No    2. Have you seen or consulted any other health care providers outside of the [facility] since your last visit?  Include any pap smears or colon screening. No    
medications as prescribed. is checking blood pressures at home and reports range is mostly [id number]. No significant hyper or hypotensive episodes that the patient reports today.    She's been having weird symptoms she tells me \"pressure on the arms, its vivid\"    Wanted to make sure we talked to her about the potassium she used to take with seroquel  She says muscle relaxers make it worse, was being given potassium    Lab Results   Component Value Date     04/25/2022    K 4.3 04/25/2022     04/25/2022    CO2 28 04/25/2022    BUN 16 04/25/2022    CREATININE 0.67 04/25/2022    GLUCOSE 107 (H) 04/25/2022    CALCIUM 10.5 (H) 04/25/2022    PROT 7.3 10/13/2021    LABALBU 3.8 10/13/2021    BILITOT 0.4 10/13/2021    ALKPHOS 76 10/13/2021    AST 36 10/13/2021    ALT 34 10/13/2021    GFRAA >60 04/25/2022    AGRATIO 1.1 10/13/2021    GLOB 3.5 10/13/2021     No labs since 2022  She is wearing shoes to bed because she is having foot cramping    Continues to see Dr Dyer for psychiatry care      Medications, allergies, PMH, PSH, SOCH, FAMH reviewed and updated per routine protocol, see chart for review and changes if not noted here.    Review of Systems    A 12 point review of systems was negative except as noted here or in the HPI.    Objective:   Vital Signs: (As obtained by patient/caregiver at home)        1/31/2024     9:15 AM   Patient-Reported Vitals   Patient-Reported Weight 132lb   Patient-Reported Height 5f2i   Patient-Reported Systolic 123 mmHg   Patient-Reported Diastolic 85 mmHg   Patient-Reported Pulse 109         [INSTRUCTIONS:  \"[x]\" Indicates a positive item  \"[]\" Indicates a negative item  -- DELETE ALL ITEMS NOT EXAMINED]    Constitutional: [x] Appears well-developed and well-nourished [x] No apparent distress      [] Abnormal -     Mental status: [x] Alert and awake  [x] Oriented to person/place/time [x] Able to follow commands    [] Abnormal -     Eyes:   EOM    [x]  Normal    [] Abnormal

## 2024-02-05 ENCOUNTER — TELEPHONE (OUTPATIENT)
Facility: CLINIC | Age: 74
End: 2024-02-05

## 2024-02-05 DIAGNOSIS — G62.9 PERIPHERAL NERVE DISORDER: ICD-10-CM

## 2024-02-05 DIAGNOSIS — R73.01 IMPAIRED FASTING GLUCOSE: ICD-10-CM

## 2024-02-05 DIAGNOSIS — R79.0 LOW FERRITIN LEVEL: Primary | ICD-10-CM

## 2024-02-05 DIAGNOSIS — I10 ESSENTIAL HYPERTENSION: ICD-10-CM

## 2024-02-05 NOTE — TELEPHONE ENCOUNTER
Pt called wanting an appointment and there are none available. Asking if labs can be changed to [facility] lab so she can get it done.    [phone] please call once changed

## 2024-02-06 DIAGNOSIS — R79.0 LOW FERRITIN LEVEL: ICD-10-CM

## 2024-02-06 DIAGNOSIS — I10 ESSENTIAL HYPERTENSION: ICD-10-CM

## 2024-02-06 DIAGNOSIS — G62.9 PERIPHERAL NERVE DISORDER: ICD-10-CM

## 2024-02-06 DIAGNOSIS — R73.01 IMPAIRED FASTING GLUCOSE: ICD-10-CM

## 2024-02-07 LAB
ALBUMIN SERPL-MCNC: 4.1 G/DL (ref 3.5–5)
ALBUMIN/GLOB SERPL: 1.4 (ref 1.1–2.2)
ALP SERPL-CCNC: 71 U/L (ref 45–117)
ALT SERPL-CCNC: 20 U/L (ref 12–78)
ANION GAP SERPL CALC-SCNC: 5 MMOL/L (ref 5–15)
AST SERPL-CCNC: 31 U/L (ref 15–37)
BILIRUB SERPL-MCNC: 0.4 MG/DL (ref 0.2–1)
BUN SERPL-MCNC: 16 MG/DL (ref 6–20)
BUN/CREAT SERPL: 20 (ref 12–20)
CALCIUM SERPL-MCNC: 9.5 MG/DL (ref 8.5–10.1)
CHLORIDE SERPL-SCNC: 97 MMOL/L (ref 97–108)
CHOLEST SERPL-MCNC: 182 MG/DL
CO2 SERPL-SCNC: 30 MMOL/L (ref 21–32)
CREAT SERPL-MCNC: 0.81 MG/DL (ref 0.55–1.02)
CREAT UR-MCNC: 106 MG/DL
ERYTHROCYTE [DISTWIDTH] IN BLOOD BY AUTOMATED COUNT: 12.9 % (ref 11.5–14.5)
EST. AVERAGE GLUCOSE BLD GHB EST-MCNC: 120 MG/DL
FERRITIN SERPL-MCNC: 115 NG/ML (ref 26–388)
GLOBULIN SER CALC-MCNC: 2.9 G/DL (ref 2–4)
GLUCOSE SERPL-MCNC: 115 MG/DL (ref 65–100)
HBA1C MFR BLD: 5.8 % (ref 4–5.6)
HCT VFR BLD AUTO: 40.9 % (ref 35–47)
HDLC SERPL-MCNC: 70 MG/DL
HDLC SERPL: 2.6 (ref 0–5)
HGB BLD-MCNC: 13.4 G/DL (ref 11.5–16)
LDLC SERPL CALC-MCNC: 92.2 MG/DL (ref 0–100)
MCH RBC QN AUTO: 31.4 PG (ref 26–34)
MCHC RBC AUTO-ENTMCNC: 32.8 G/DL (ref 30–36.5)
MCV RBC AUTO: 95.8 FL (ref 80–99)
MICROALBUMIN UR-MCNC: 0.72 MG/DL
MICROALBUMIN/CREAT UR-RTO: 7 MG/G (ref 0–30)
NRBC # BLD: 0 K/UL (ref 0–0.01)
NRBC BLD-RTO: 0 PER 100 WBC
PLATELET # BLD AUTO: 250 K/UL (ref 150–400)
PMV BLD AUTO: 10.2 FL (ref 8.9–12.9)
POTASSIUM SERPL-SCNC: 4.8 MMOL/L (ref 3.5–5.1)
PROT SERPL-MCNC: 7 G/DL (ref 6.4–8.2)
RBC # BLD AUTO: 4.27 M/UL (ref 3.8–5.2)
SODIUM SERPL-SCNC: 132 MMOL/L (ref 136–145)
TRIGL SERPL-MCNC: 99 MG/DL
VLDLC SERPL CALC-MCNC: 19.8 MG/DL
WBC # BLD AUTO: 5.8 K/UL (ref 3.6–11)

## 2024-02-08 ENCOUNTER — TELEPHONE (OUTPATIENT)
Facility: CLINIC | Age: 74
End: 2024-02-08

## 2024-02-08 NOTE — TELEPHONE ENCOUNTER
PT had blood work and urinalysis done   2/06/2024 she wants a nurse to call her with results she would like a call back soon.

## 2024-08-12 ENCOUNTER — HOSPITAL ENCOUNTER (OUTPATIENT)
Facility: HOSPITAL | Age: 74
Discharge: HOME | End: 2024-08-15
Payer: MEDICARE

## 2024-08-12 DIAGNOSIS — Z12.31 ENCOUNTER FOR SCREENING MAMMOGRAM FOR MALIGNANT NEOPLASM OF BREAST: ICD-10-CM

## 2024-08-12 PROCEDURE — 77063 BREAST TOMOSYNTHESIS BI: CPT

## 2024-08-29 NOTE — PROGRESS NOTES
Elizabeth Chinchilla is a 74 y.o. female presents for a problem visit.    Chief Complaint   Patient presents with    Abnormal Pap Smear     No LMP recorded. Patient is postmenopausal.  Birth Control: post menopausal status.  Last Pap: today  The patient is reporting having: asking for refill rx. And pap smear      Examination chaperoned by Heather Fischer MA.

## 2024-08-30 ENCOUNTER — OFFICE VISIT (OUTPATIENT)
Age: 74
End: 2024-08-30

## 2024-08-30 DIAGNOSIS — Z01.419 ENCOUNTER FOR WELL WOMAN EXAM: ICD-10-CM

## 2024-08-30 DIAGNOSIS — Z13.9 SCREENING DUE: Primary | ICD-10-CM

## 2024-08-30 DIAGNOSIS — Z12.4 CERVICAL CANCER SCREENING: ICD-10-CM

## 2024-08-30 NOTE — PROGRESS NOTES
Problem Relation Age of Onset    No Known Problems Mother     Heart Surgery Father     Hypertension Father     Anesth Problems Neg Hx         Review of Systems - History obtained from the patient  Constitutional: negative for weight loss, fever, night sweats  HEENT: negative for hearing loss, earache, congestion, snoring, sorethroat  CV: negative for chest pain, palpitations, edema  Resp: negative for cough, shortness of breath, wheezing  GI: negative for change in bowel habits, abdominal pain, black or bloody stools  : negative for frequency, dysuria, hematuria, vaginal discharge  MSK: negative for back pain, joint pain, muscle pain  Breast: negative for breast lumps, nipple discharge, galactorrhea  Skin :negative for itching, rash, hives  Neuro: negative for dizziness, headache, confusion, weakness  Psych: negative for anxiety, depression, change in mood  Heme/lymph: negative for bleeding, bruising, pallor    Physical Exam    There were no vitals taken for this visit.    Constitutional  Appearance: well-nourished, well developed, alert, in no acute distress    HENT  Head and Face: appears normal    Neck  Inspection/Palpation: normal appearance, no masses or tenderness  Lymph Nodes: no lymphadenopathy present  Thyroid: gland size normal, nontender, no nodules or masses present on palpation    Chest  Respiratory Effort: breathing unlabored  Auscultation: normal breath sounds    Cardiovascular  Heart:  Auscultation: regular rate and rhythm without murmur    Breasts  Inspection of Breasts: breasts symmetrical, no skin changes, no discharge present, nipple appearance normal, no skin retraction present  Palpation of Breasts and Axillae: no masses present on palpation, no breast tenderness  Axillary Lymph Nodes: no lymphadenopathy present    Gastrointestinal  Abdominal Examination: abdomen non-tender to palpation, normal bowel sounds, no masses present  Hernias: no hernias identified    Genitourinary  External  Genitalia: normal appearance for age, no discharge present, no tenderness present, no inflammatory lesions present, no masses present, atrophy present  Vagina: atrophic but otherwise normal vaginal vault without central or paravaginal defects, no discharge present, no inflammatory lesions present, no masses present  Bladder: non-tender to palpation  Urethra: appears normal  Cervix: stage 3 apical prolapse, cervix at level of introitus   Uterus: normal size, shape and consistency  Adnexa: no adnexal tenderness present, no adnexal masses present  Perineum: perineum within normal limits, no evidence of trauma, no rashes or skin lesions present  Anus: anus within normal limits, no hemorrhoids present  Inguinal Lymph Nodes: no lymphadenopathy present    Skin  General Inspection: no rash, no lesions identified    Neurologic/Psychiatric  Mental Status:  Orientation: grossly oriented to person, place and time  Mood and Affect: mood normal, affect appropriate    Assessment:  Routine gynecologic examination  POP, apical, urogyn info given for VPFW as patient not interested in conservative therapies  DEXA ordered, encouraged to discuss her concerns with her PCP and reiterated that 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

## 2024-09-01 ENCOUNTER — HOSPITAL ENCOUNTER (EMERGENCY)
Facility: HOSPITAL | Age: 74
Discharge: ANOTHER HOSPITAL | End: 2024-09-01
Attending: EMERGENCY MEDICINE
Payer: MEDICARE

## 2024-09-01 ENCOUNTER — APPOINTMENT (OUTPATIENT)
Facility: HOSPITAL | Age: 74
End: 2024-09-01
Payer: MEDICARE

## 2024-09-01 ENCOUNTER — HOSPITAL ENCOUNTER (INPATIENT)
Facility: HOSPITAL | Age: 74
LOS: 1 days | Discharge: HOME | DRG: 885 | End: 2024-09-02
Attending: PSYCHIATRY & NEUROLOGY | Admitting: PSYCHIATRY & NEUROLOGY
Payer: MEDICARE

## 2024-09-01 VITALS
WEIGHT: 135 LBS | DIASTOLIC BLOOD PRESSURE: 74 MMHG | RESPIRATION RATE: 17 BRPM | HEIGHT: 62 IN | TEMPERATURE: 97.6 F | HEART RATE: 56 BPM | BODY MASS INDEX: 24.84 KG/M2 | SYSTOLIC BLOOD PRESSURE: 144 MMHG | OXYGEN SATURATION: 98 %

## 2024-09-01 DIAGNOSIS — F99 MENTAL HEALTH DISORDER: Primary | ICD-10-CM

## 2024-09-01 LAB
ALBUMIN SERPL-MCNC: 3.6 G/DL (ref 3.5–5)
ALBUMIN/GLOB SERPL: 0.9 (ref 1.1–2.2)
ALP SERPL-CCNC: 71 U/L (ref 45–117)
ALT SERPL-CCNC: 35 U/L (ref 12–78)
AMMONIA PLAS-SCNC: 27 UMOL/L
AMPHET UR QL SCN: NEGATIVE
ANION GAP SERPL CALC-SCNC: 4 MMOL/L (ref 5–15)
APAP SERPL-MCNC: <2 UG/ML (ref 10–30)
APPEARANCE UR: CLEAR
AST SERPL-CCNC: 29 U/L (ref 15–37)
BACTERIA URNS QL MICRO: NEGATIVE /HPF
BARBITURATES UR QL SCN: NEGATIVE
BASOPHILS # BLD: 0 K/UL (ref 0–0.1)
BASOPHILS NFR BLD: 1 % (ref 0–1)
BENZODIAZ UR QL: NEGATIVE
BILIRUB SERPL-MCNC: 0.4 MG/DL (ref 0.2–1)
BILIRUB UR QL: NEGATIVE
BUN SERPL-MCNC: 21 MG/DL (ref 6–20)
BUN/CREAT SERPL: 28 (ref 12–20)
CALCIUM SERPL-MCNC: 9.3 MG/DL (ref 8.5–10.1)
CANNABINOIDS UR QL SCN: NEGATIVE
CHLORIDE SERPL-SCNC: 109 MMOL/L (ref 97–108)
CO2 SERPL-SCNC: 25 MMOL/L (ref 21–32)
COCAINE UR QL SCN: NEGATIVE
COLOR UR: ABNORMAL
COMMENT:: NORMAL
CREAT SERPL-MCNC: 0.76 MG/DL (ref 0.55–1.02)
DIFFERENTIAL METHOD BLD: NORMAL
EKG ATRIAL RATE: 57 BPM
EKG DIAGNOSIS: NORMAL
EKG P AXIS: 72 DEGREES
EKG P-R INTERVAL: 196 MS
EKG Q-T INTERVAL: 400 MS
EKG QRS DURATION: 84 MS
EKG QTC CALCULATION (BAZETT): 389 MS
EKG R AXIS: 59 DEGREES
EKG T AXIS: 71 DEGREES
EKG VENTRICULAR RATE: 57 BPM
EOSINOPHIL # BLD: 0.2 K/UL (ref 0–0.4)
EOSINOPHIL NFR BLD: 4 % (ref 0–7)
EPITH CASTS URNS QL MICRO: ABNORMAL /LPF
ERYTHROCYTE [DISTWIDTH] IN BLOOD BY AUTOMATED COUNT: 13.4 % (ref 11.5–14.5)
ETHANOL SERPL-MCNC: <10 MG/DL (ref 0–0.08)
GLOBULIN SER CALC-MCNC: 3.8 G/DL (ref 2–4)
GLUCOSE SERPL-MCNC: 103 MG/DL (ref 65–100)
GLUCOSE UR STRIP.AUTO-MCNC: NEGATIVE MG/DL
HCT VFR BLD AUTO: 37 % (ref 35–47)
HGB BLD-MCNC: 12.3 G/DL (ref 11.5–16)
HGB UR QL STRIP: NEGATIVE
IMM GRANULOCYTES # BLD AUTO: 0 K/UL (ref 0–0.04)
IMM GRANULOCYTES NFR BLD AUTO: 0 % (ref 0–0.5)
KETONES UR QL STRIP.AUTO: NEGATIVE MG/DL
LEUKOCYTE ESTERASE UR QL STRIP.AUTO: ABNORMAL
LYMPHOCYTES # BLD: 2 K/UL (ref 0.8–3.5)
LYMPHOCYTES NFR BLD: 43 % (ref 12–49)
Lab: NORMAL
MCH RBC QN AUTO: 30.2 PG (ref 26–34)
MCHC RBC AUTO-ENTMCNC: 33.2 G/DL (ref 30–36.5)
MCV RBC AUTO: 90.9 FL (ref 80–99)
METHADONE UR QL: NEGATIVE
MONOCYTES # BLD: 0.4 K/UL (ref 0–1)
MONOCYTES NFR BLD: 9 % (ref 5–13)
NEUTS SEG # BLD: 2 K/UL (ref 1.8–8)
NEUTS SEG NFR BLD: 43 % (ref 32–75)
NITRITE UR QL STRIP.AUTO: NEGATIVE
NRBC # BLD: 0 K/UL (ref 0–0.01)
NRBC BLD-RTO: 0 PER 100 WBC
OPIATES UR QL: NEGATIVE
PCP UR QL: NEGATIVE
PH UR STRIP: 5.5 (ref 5–8)
PLATELET # BLD AUTO: 214 K/UL (ref 150–400)
PMV BLD AUTO: 9 FL (ref 8.9–12.9)
POTASSIUM SERPL-SCNC: 4.4 MMOL/L (ref 3.5–5.1)
PROT SERPL-MCNC: 7.4 G/DL (ref 6.4–8.2)
PROT UR STRIP-MCNC: NEGATIVE MG/DL
RBC # BLD AUTO: 4.07 M/UL (ref 3.8–5.2)
RBC #/AREA URNS HPF: ABNORMAL /HPF (ref 0–5)
SALICYLATES SERPL-MCNC: <1.7 MG/DL (ref 2.8–20)
SODIUM SERPL-SCNC: 138 MMOL/L (ref 136–145)
SP GR UR REFRACTOMETRY: 1.01 (ref 1–1.03)
SPECIMEN HOLD: NORMAL
T4 FREE SERPL-MCNC: 0.9 NG/DL (ref 0.8–1.5)
TSH SERPL DL<=0.05 MIU/L-ACNC: 3.32 UIU/ML (ref 0.36–3.74)
URINE CULTURE IF INDICATED: ABNORMAL
UROBILINOGEN UR QL STRIP.AUTO: 0.2 EU/DL (ref 0.2–1)
WBC # BLD AUTO: 4.6 K/UL (ref 3.6–11)
WBC URNS QL MICRO: ABNORMAL /HPF (ref 0–4)

## 2024-09-01 PROCEDURE — 36415 COLL VENOUS BLD VENIPUNCTURE: CPT

## 2024-09-01 PROCEDURE — 93005 ELECTROCARDIOGRAM TRACING: CPT | Performed by: EMERGENCY MEDICINE

## 2024-09-01 PROCEDURE — 80143 DRUG ASSAY ACETAMINOPHEN: CPT

## 2024-09-01 PROCEDURE — 81001 URINALYSIS AUTO W/SCOPE: CPT

## 2024-09-01 PROCEDURE — 70450 CT HEAD/BRAIN W/O DYE: CPT

## 2024-09-01 PROCEDURE — 1240000000 HC EMOTIONAL WELLNESS R&B

## 2024-09-01 PROCEDURE — 84439 ASSAY OF FREE THYROXINE: CPT

## 2024-09-01 PROCEDURE — 80053 COMPREHEN METABOLIC PANEL: CPT

## 2024-09-01 PROCEDURE — 90791 PSYCH DIAGNOSTIC EVALUATION: CPT

## 2024-09-01 PROCEDURE — 80179 DRUG ASSAY SALICYLATE: CPT

## 2024-09-01 PROCEDURE — 6370000000 HC RX 637 (ALT 250 FOR IP): Performed by: PSYCHIATRY & NEUROLOGY

## 2024-09-01 PROCEDURE — 99284 EMERGENCY DEPT VISIT MOD MDM: CPT

## 2024-09-01 PROCEDURE — 84443 ASSAY THYROID STIM HORMONE: CPT

## 2024-09-01 PROCEDURE — 85025 COMPLETE CBC W/AUTO DIFF WBC: CPT

## 2024-09-01 PROCEDURE — 93010 ELECTROCARDIOGRAM REPORT: CPT | Performed by: INTERNAL MEDICINE

## 2024-09-01 PROCEDURE — 94761 N-INVAS EAR/PLS OXIMETRY MLT: CPT

## 2024-09-01 PROCEDURE — 80307 DRUG TEST PRSMV CHEM ANLYZR: CPT

## 2024-09-01 PROCEDURE — 82077 ASSAY SPEC XCP UR&BREATH IA: CPT

## 2024-09-01 PROCEDURE — 82140 ASSAY OF AMMONIA: CPT

## 2024-09-01 RX ORDER — LANOLIN ALCOHOL/MO/W.PET/CERES
3 CREAM (GRAM) TOPICAL NIGHTLY
Status: DISCONTINUED | OUTPATIENT
Start: 2024-09-01 | End: 2024-09-02 | Stop reason: HOSPADM

## 2024-09-01 RX ORDER — DIPHENHYDRAMINE HYDROCHLORIDE 50 MG/ML
25 INJECTION INTRAMUSCULAR; INTRAVENOUS EVERY 4 HOURS PRN
Status: DISCONTINUED | OUTPATIENT
Start: 2024-09-01 | End: 2024-09-02 | Stop reason: HOSPADM

## 2024-09-01 RX ORDER — POLYETHYLENE GLYCOL 3350 17 G/17G
17 POWDER, FOR SOLUTION ORAL DAILY PRN
Status: DISCONTINUED | OUTPATIENT
Start: 2024-09-01 | End: 2024-09-02 | Stop reason: HOSPADM

## 2024-09-01 RX ORDER — MAGNESIUM HYDROXIDE/ALUMINUM HYDROXICE/SIMETHICONE 120; 1200; 1200 MG/30ML; MG/30ML; MG/30ML
30 SUSPENSION ORAL EVERY 6 HOURS PRN
Status: DISCONTINUED | OUTPATIENT
Start: 2024-09-01 | End: 2024-09-02 | Stop reason: HOSPADM

## 2024-09-01 RX ORDER — SENNOSIDES A AND B 8.6 MG/1
1 TABLET, FILM COATED ORAL DAILY PRN
Status: DISCONTINUED | OUTPATIENT
Start: 2024-09-01 | End: 2024-09-02 | Stop reason: HOSPADM

## 2024-09-01 RX ORDER — ACETAMINOPHEN 325 MG/1
650 TABLET ORAL EVERY 4 HOURS PRN
Status: DISCONTINUED | OUTPATIENT
Start: 2024-09-01 | End: 2024-09-02 | Stop reason: HOSPADM

## 2024-09-01 RX ORDER — HALOPERIDOL 5 MG/1
2.5 TABLET ORAL EVERY 4 HOURS PRN
Status: DISCONTINUED | OUTPATIENT
Start: 2024-09-01 | End: 2024-09-02 | Stop reason: HOSPADM

## 2024-09-01 RX ORDER — LOSARTAN POTASSIUM 25 MG/1
25 TABLET ORAL DAILY
Status: ON HOLD | COMMUNITY
End: 2024-09-02 | Stop reason: HOSPADM

## 2024-09-01 RX ORDER — HYDROXYZINE HYDROCHLORIDE 10 MG/1
10 TABLET, FILM COATED ORAL 3 TIMES DAILY PRN
Status: DISCONTINUED | OUTPATIENT
Start: 2024-09-01 | End: 2024-09-02 | Stop reason: HOSPADM

## 2024-09-01 RX ORDER — HALOPERIDOL 5 MG/ML
2.5 INJECTION INTRAMUSCULAR EVERY 4 HOURS PRN
Status: DISCONTINUED | OUTPATIENT
Start: 2024-09-01 | End: 2024-09-02 | Stop reason: HOSPADM

## 2024-09-01 RX ADMIN — Medication 3 MG: at 20:48

## 2024-09-01 ASSESSMENT — SLEEP AND FATIGUE QUESTIONNAIRES
SLEEP PATTERN: INSOMNIA
DO YOU USE A SLEEP AID: NO
AVERAGE NUMBER OF SLEEP HOURS: 5
DO YOU HAVE DIFFICULTY SLEEPING: YES

## 2024-09-01 ASSESSMENT — LIFESTYLE VARIABLES
HOW MANY STANDARD DRINKS CONTAINING ALCOHOL DO YOU HAVE ON A TYPICAL DAY: PATIENT DOES NOT DRINK
HOW OFTEN DO YOU HAVE A DRINK CONTAINING ALCOHOL: NEVER

## 2024-09-01 ASSESSMENT — PAIN - FUNCTIONAL ASSESSMENT
PAIN_FUNCTIONAL_ASSESSMENT: NONE - DENIES PAIN
PAIN_FUNCTIONAL_ASSESSMENT: NONE - DENIES PAIN

## 2024-09-01 NOTE — BSMART NOTE
Comprehensive Assessment Form Part 1    Section I - Disposition    Primary Diagnosis: Bipolar Disorder  Secondary Diagnosis: PTSD    The Medical Doctor to Psychiatrist conference was not completed.  The Medical Doctor is in agreement with Psychiatrist disposition because of (reason) patient is requesting voluntary admission.  The plan is admit to inpatient psych once medically cleared.  The on-call Psychiatrist consulted was Dr. CROW.  The admitting Psychiatrist will be Dr. CROW.  The admitting Diagnosis is Bipolar Disorder.  The Payor source is [facility] Medicare and Medicaid.  This writer reviewed the Cecil Suicide Severity Rating Scale in nursing flowsheet and the risk level assigned is no risk.  Based on this assessment, the risk of suicide is none and the plan is admit to inpatient psych.    Section II - Integrated Summary  Summary:  Patient is a 74 year old female seen via telepsych.  She came to the ER complaining of issues related to her blood pressure, and she reported being under a lot of stress recently.  She presented with pressured speech and was noted to have flight of ideas.  A nursing note states \"will be talking about one thing and then change mid conversation to a different topic.\"  She stated she wanted to be admitted to [facility], and BSMART was consulted.  Patient's medical record indicates that she has a history of Bipolar Disorder, however patient was adamant in telling this clinician that this is not accurate and she \"doesn't want to be labeled as a mental health patient.\"  She reported her correct diagnosis is \"complex PTSD,\" and said she has a long history of trauma beginning in childhood.  She has been admitted to inpatient [facility]'s in the past, but has never been to [place].  She stated she wants to go there because \"I'm a Orthodox, a Anglican, and a Judaism.\"  Her last inpatient admission was at [facility] 6 years ago, around the time that one of her adult children committed suicide via

## 2024-09-01 NOTE — ED TRIAGE NOTES
Pt arrived via EMS c/o BP problems. States she has been having episodes of hypotension and hypertension over the past 2 days. Pt denies headache or vision changes, but states her head is \"in the clouds\".      Pt states she gets hypertensive because she's controlling. States she has been dealing with changes in physicians d/t multiple doctors retiring. Patient /83(93).     Pt experiencing flight of ideas. Denies thoughts of hurting self or others. States she would like to go to [facility] to just sit there, but won't because it's a crazy bin.

## 2024-09-01 NOTE — ED NOTES
Patient states multiple times she feels she needs to go to [facility] psychiatric unit. Reports she has a lot of stress currently and feels like she is needing support. Denies SI/HI. Patient is noted to have flighty ideas. Will be talking about one thing and then change mid conversation to a different topic.

## 2024-09-01 NOTE — BSMART NOTE
BSMART assessment completed, and suicide risk level noted to be no risk. Primary Nurse Lizzie and Physician Dr. Li notified. Concerns not observed.    1:1 sitter not needed.

## 2024-09-01 NOTE — BH NOTE
TRANSFER - IN REPORT:    Verbal report received from Lizzie Mon RN on Elizabeth Chinchilla  being received from [facility] for change in condition.      Report consisted of patient's Situation, Background, Assessment and   Recommendations(SBAR).     Information from the following report(s) ED SBAR, MAR, and Recent Results was reviewed with the receiving nurse.    Opportunity for questions and clarification was provided.      Assessment completed upon patient's arrival to unit and care assumed.

## 2024-09-01 NOTE — BSMART NOTE
BSMART Note    Patient has been accepted to [facility] room 732 bed 2 by Dr. Greta Stuot.  The number for nurse to nurse report is [phone].    Mana Loco MA

## 2024-09-01 NOTE — ED NOTES
Multiple attempts made to call report on this patient and nurse unavailable. Left number and nurse to call back when finished with current patient care.

## 2024-09-02 VITALS
HEIGHT: 61 IN | OXYGEN SATURATION: 96 % | SYSTOLIC BLOOD PRESSURE: 143 MMHG | DIASTOLIC BLOOD PRESSURE: 81 MMHG | WEIGHT: 135 LBS | TEMPERATURE: 97.8 F | RESPIRATION RATE: 16 BRPM | BODY MASS INDEX: 25.49 KG/M2 | HEART RATE: 63 BPM

## 2024-09-02 PROBLEM — F31.9 BIPOLAR DISORDER (HCC): Status: ACTIVE | Noted: 2024-09-02

## 2024-09-02 LAB
CHOLEST SERPL-MCNC: 217 MG/DL
EST. AVERAGE GLUCOSE BLD GHB EST-MCNC: 111 MG/DL
GLUCOSE BLD STRIP.AUTO-MCNC: 106 MG/DL (ref 65–117)
GLUCOSE BLD STRIP.AUTO-MCNC: 130 MG/DL (ref 65–117)
HBA1C MFR BLD: 5.5 % (ref 4–5.6)
HDLC SERPL-MCNC: 97 MG/DL
HDLC SERPL: 2.2 (ref 0–5)
LDLC SERPL CALC-MCNC: 104 MG/DL (ref 0–100)
SERVICE CMNT-IMP: ABNORMAL
SERVICE CMNT-IMP: NORMAL
TRIGL SERPL-MCNC: 80 MG/DL
VLDLC SERPL CALC-MCNC: 16 MG/DL

## 2024-09-02 PROCEDURE — 83036 HEMOGLOBIN GLYCOSYLATED A1C: CPT

## 2024-09-02 PROCEDURE — 82962 GLUCOSE BLOOD TEST: CPT

## 2024-09-02 PROCEDURE — 6370000000 HC RX 637 (ALT 250 FOR IP): Performed by: PSYCHIATRY & NEUROLOGY

## 2024-09-02 PROCEDURE — 80061 LIPID PANEL: CPT

## 2024-09-02 PROCEDURE — 36415 COLL VENOUS BLD VENIPUNCTURE: CPT

## 2024-09-02 PROCEDURE — 6370000000 HC RX 637 (ALT 250 FOR IP): Performed by: PHYSICIAN ASSISTANT

## 2024-09-02 RX ORDER — LOSARTAN POTASSIUM 50 MG/1
50 TABLET ORAL DAILY
Qty: 30 TABLET | Refills: 0 | Status: SHIPPED | OUTPATIENT
Start: 2024-09-03 | End: 2024-09-04

## 2024-09-02 RX ORDER — METOPROLOL SUCCINATE 100 MG/1
100 TABLET, EXTENDED RELEASE ORAL DAILY
Status: ON HOLD | COMMUNITY
End: 2024-09-02 | Stop reason: HOSPADM

## 2024-09-02 RX ORDER — LOSARTAN POTASSIUM 50 MG/1
25 TABLET ORAL DAILY
Status: DISCONTINUED | OUTPATIENT
Start: 2024-09-02 | End: 2024-09-02

## 2024-09-02 RX ORDER — LOSARTAN POTASSIUM 50 MG/1
25 TABLET ORAL ONCE
Status: COMPLETED | OUTPATIENT
Start: 2024-09-02 | End: 2024-09-02

## 2024-09-02 RX ORDER — METOPROLOL SUCCINATE 50 MG/1
100 TABLET, EXTENDED RELEASE ORAL DAILY
Status: DISCONTINUED | OUTPATIENT
Start: 2024-09-02 | End: 2024-09-02 | Stop reason: HOSPADM

## 2024-09-02 RX ORDER — LOSARTAN POTASSIUM 50 MG/1
50 TABLET ORAL DAILY
Status: DISCONTINUED | OUTPATIENT
Start: 2024-09-03 | End: 2024-09-02 | Stop reason: HOSPADM

## 2024-09-02 RX ADMIN — LOSARTAN POTASSIUM 25 MG: 50 TABLET, FILM COATED ORAL at 09:51

## 2024-09-02 RX ADMIN — ACETAMINOPHEN 650 MG: 325 TABLET ORAL at 11:09

## 2024-09-02 RX ADMIN — HYDROXYZINE HYDROCHLORIDE 10 MG: 10 TABLET ORAL at 08:03

## 2024-09-02 RX ADMIN — ACETAMINOPHEN 650 MG: 325 TABLET ORAL at 01:43

## 2024-09-02 RX ADMIN — LOSARTAN POTASSIUM 25 MG: 50 TABLET, FILM COATED ORAL at 08:04

## 2024-09-02 RX ADMIN — HYDROXYZINE HYDROCHLORIDE 10 MG: 10 TABLET ORAL at 01:42

## 2024-09-02 ASSESSMENT — PAIN DESCRIPTION - LOCATION
LOCATION: HEAD
LOCATION: GENERALIZED

## 2024-09-02 ASSESSMENT — PAIN DESCRIPTION - DESCRIPTORS: DESCRIPTORS: ACHING

## 2024-09-02 ASSESSMENT — PAIN SCALES - GENERAL: PAINLEVEL_OUTOF10: 4

## 2024-09-02 ASSESSMENT — PAIN DESCRIPTION - ORIENTATION: ORIENTATION: ANTERIOR

## 2024-09-02 NOTE — H&P
History and Physical    Date of Service:  9/2/2024  Primary Care Provider: Johana Mo PA-C  Source of information: Patient, Chart Review    Chief Complaint: No chief complaint on file.      History of Presenting Illness:   Elizabeth Chinchilla is a 74 y.o. female with a PMHx of Hepatitis C (s/p antiviral therapy), PUD, Prediabetes, reported seizures, HTN, Bipolar disorder, PTSD, fibromyalgia and tobacco use.     She presents to [facility] for management of Bipolar disorder. She presented to OSH with complaints of cesilia fog, flight of ideas and pressured speech. She reported feeling overwhelmed and that she was not receiving enough professional support. Hospitalist saw today (9/2) for H&P.     The patient has no acute medical complaints at the time of exam. She expressed frustration with only being able to receive primary care via telemedicine. She does endorse taking both losartan and metoprolol. I explained to the patient that the metoprolol was making her heart rate low and we should not continue this but rather increase her losartan to which she was amenable.     She reports having a history of prediabetes with an A1C of 6% this winter. A1C this admission is 5.5%, she reports taking an herbal supplement to manage this. She did request to speak with diabetes educator prior to discharge.      REVIEW OF SYSTEMS:  A comprehensive review of systems was negative except for that written in the History of Present Illness.     Past Medical History:   Diagnosis Date    Anxiety     Bipolar 2 disorder (HCC) 09/2018    Blurred vision     COVID-19 vaccine series completed     Deep vein thrombosis (HCC) For years    Leg hurt    Fibromyalgia     Hemorrhoids     Hepatitis C virus infection cured after antiviral drug therapy     History of bleeding peptic ulcer 2016 & 2017    History of smoking     Hypertension     Long history    Psoriasis 2/10/2022    PTSD (post-traumatic stress disorder)     Seizures (HCC) 2024    Fears  
[facility] BEHAVIORAL HEALTH  INITIAL PSYCHIATRIC INTERVIEW:    Name: Elizabeth Chinchilla  MR#: [id number]  ACCOUNT#: [id number]  : 1950  ADMIT DATE: 2024    CHIEF COMPLAINT:    HISTORY OF PRESENTING COMPLAINT:  This is a 74 y.o. female who is currently admitted to the acute psychiatric floor at [facility] on a voluntary basis. The pt reports that she originally came to the hospital for help with her blood pressure. The pt has not been struggling with suicidal ideation, but has identified family stressors as a major trigger for worsening mental health, as she reports she's been disinherited and her children do not want a relationship with her. However, she is engaged in outpatient treatment, sees her psychiatrist twice a week and therapist once a week as well as peer support 3 times a week.     PAST PSYCHIATRIC HISTORY: Hx of bipolar disorder and PTSD. She does have a hx of inpatient psychiatric hospitalizations, most recently 6 years ago. The pt sees Marybeth Riddle NP for outpatient psychiatry with [facility]. She sees a therapist, Mana with [facility].     SUBSTANCE ABUSE HISTORY:  Hx of etoh use disorder, goes to AA, not currently drinking, reports 6 years of sobriety     PSYCHOSOCIAL HISTORY: The pt is single, lives alone. She has two living adult children who do not live locally and are not involved in her care. Source of income is social security.     FAMILY HISTORY:   Son  by suicide 6 years ago     PAST MEDICAL HISTORY:   Past Medical History:   Diagnosis Date    Anxiety     Bipolar 2 disorder (HCC) 2018    Blurred vision     COVID-19 vaccine series completed     Deep vein thrombosis (HCC) For years    Leg hurt    Fibromyalgia     Hemorrhoids     Hepatitis C virus infection cured after antiviral drug therapy     History of bleeding peptic ulcer  & 2017    History of smoking     Hypertension     Long history    Psoriasis 2/10/2022    PTSD (post-traumatic stress 
Constitutional: (-) fever  Eyes/ENT: (-) blurry vision, (-) epistaxis  Cardiovascular: (-) chest pain, (-) syncope  Respiratory: (-) cough, (-) shortness of breath  Gastrointestinal: (-) vomiting, (-) diarrhea  : (+) L flank pain, (-) dysuria, (-) hematuria  Musculoskeletal: (-) neck pain, (-) back pain, (-) joint pain  Integumentary: (-) rash, (-) edema  Neurological: (-) headache, (-) altered mental status  Allergic/Immunologic: (-) pruritus

## 2024-09-02 NOTE — PLAN OF CARE
Problem: Safety - Adult  Goal: Free from fall injury  Outcome: Progressing     Pt received resting in bed with eyes closed. Breathing is even and unlabored. Walkways are free and clear from clutter.   
PT is labile but redirectable, no behavior issues.  Denies S/I, H/I, A/H and VH.  Med/meal compliant.    Problem: Safety - Adult  Goal: Free from fall injury  9/2/2024 1040 by May Mullins RN  Outcome: Progressing  9/2/2024 0048 by Maribel Alvares LPN  Outcome: Progressing     Problem: Behavior  Goal: Pt/Family maintain appropriate behavior and adhere to behavioral management agreement, if implemented  Description: INTERVENTIONS:  1. Assess patient/family's coping skills and  non-compliant behavior (including use of illegal substances)  2. Notify security of behavior or suspected illegal substances which indicate the need for search of the family and/or belongings  3. Encourage verbalization of thoughts and concerns in a socially appropriate manner  4. Utilize positive, consistent limit setting strategies supporting safety of patient, staff and others  5. Encourage participation in the decision making process about the behavioral management agreement  6. If a visitor's behavior poses a threat to safety call refer to organization policy.  7. Initiate consult with , Psychosocial CNS, Spiritual Care as appropriate  Outcome: Progressing     
  1755- Perfect serve sent to Hospitalist to request H&P. Dr Aurea Killian is the on provider.   1759- perfect serve read by provider.  Pt denies SI, HI, AVH. Pt reports struggling with the suicide of her son Alphonso and has been hospitalized three times prior due to her grief.     4 Eyes Skin Assessment     NAME:  Elizabeth Chinchilla  YOB: 1950  MEDICAL RECORD NUMBER:  [id number]    The patient is being assessed for  Admission    I agree that at least one RN has performed a thorough Head to Toe Skin Assessment on the patient. ALL assessment sites listed below have been assessed.      Areas assessed by both nurses:    Head, Face, Ears, Shoulders, Back, Chest, Arms, Elbows, Hands, Sacrum. Buttock, Coccyx, Ischium, and Legs. Feet and Heels        Does the Patient have a Wound? No noted wound(s)       Saqib Prevention initiated by RN: Yes  Wound Care Orders initiated by RN: No    Pressure Injury (Stage 3,4, Unstageable, DTI, NWPT, and Complex wounds) if present, place Wound referral order by RN under : No    New Ostomies, if present place, Ostomy referral order under : No     Nurse 1 eSignature: Electronically signed by Sierra DAMICO RN on 9/1/24 at 6:01 PM EDT    **SHARE this note so that the co-signing nurse can place an eSignature**    Nurse 2 eSignature: Electronically signed by Sally CESPEDES RN on 9/1/24 at 6:01 PM EDT    Pt refuses to work on safety plan and OQ survey at this time. She states it is too upsetting for her.

## 2024-09-02 NOTE — DISCHARGE SUMMARY
DISCHARGE SUMMARY  Some parts of the discharge summary are from the initial Psychiatric interview that was done on admission by the admitting psychiatrist.     Name: Elizabeth Chinchilla  MR#: [id number]  Account#: [id number]  : 1950  Date of Admission: 2024    Date of Discharge: 2024     TYPE OF DISCHARGE:   REGULAR     INITIAL PSYCHIATRIC INTERVIEW:   CHIEF COMPLAINT:     HISTORY OF PRESENTING COMPLAINT:  This is a 74 y.o. female who is currently admitted to the acute psychiatric floor at [facility] on a voluntary basis. The pt reports that she originally came to the hospital for help with her blood pressure. The pt has not been struggling with suicidal ideation, but has identified family stressors as a major trigger for worsening mental health, as she reports she's been disinherited and her children do not want a relationship with her. However, she is engaged in outpatient treatment, sees her psychiatrist twice a week and therapist once a week as well as peer support 3 times a week.      PAST PSYCHIATRIC HISTORY: Hx of bipolar disorder and PTSD. She does have a hx of inpatient psychiatric hospitalizations, most recently 6 years ago. The pt sees Marybeth Riddle NP for outpatient psychiatry with [facility]. She sees a therapist, Mana with [facility].      SUBSTANCE ABUSE HISTORY:  Hx of etoh use disorder, goes to AA, not currently drinking, reports 6 years of sobriety      PSYCHOSOCIAL HISTORY: The pt is single, lives alone. She has two living adult children who do not live locally and are not involved in her care. Source of income is social security.      FAMILY HISTORY:   Son  by suicide 6 years ago      PAST MEDICAL HISTORY:   Past Medical History        Past Medical History:   Diagnosis Date    Anxiety      Bipolar 2 disorder (HCC) 2018    Blurred vision      COVID-19 vaccine series completed      Deep vein thrombosis (HCC) For years     Leg hurt    Fibromyalgia

## 2024-09-02 NOTE — PROGRESS NOTES
Spiritual Health Assessment/Progress Note  [facility]    Behavioral Health,  ,  , Initial Encounter    Name: Elizabeth Chinchilla MRN: [id number]    Age: 74 y.o.     Sex: female   Language: English   Restoration: Druze   Bipolar disorder (HCC)     Date: 9/2/2024            Total Time Calculated: 30 min              Spiritual Assessment began in [facility] 7W ACUTE BEHA [facility]        Referral/Consult From: Nurse   Encounter Overview/Reason: Behavioral Health  Service Provided For: Patient    Nora, Belief, Meaning:   Patient identifies as spiritual and is connected with a nora tradition or spiritual practice  Family/Friends No family/friends present      Importance and Influence:  Patient has spiritual/personal beliefs that influence decisions regarding their health  Family/Friends no family/friends present    Community:  Patient expresses feelings of isolation: disconnected from family/friends, feeling there is no one to turn to for help, and feeling no one understands  Family/Friends Other: NA    Assessment and Plan of Care:     Patient Interventions include: Facilitated expression of thoughts and feelings, Explored spiritual coping/struggle/distress and theological reflection, and Affirmed coping skills/support systems  Family/Friends Interventions include: Other: NA    Patient Plan of Care: Spiritual Care available upon further referral  Family/Friends Plan of Care: Other: NA    Electronically signed by Chaplain Danny Resident on 9/2/2024 at 10:33 AM

## 2024-09-03 NOTE — BH NOTE
Behavioral Health Transition Record    Patient Name: Elizabeth Chinchilla  YOB: 1950   Medical Record Number: [id number]  Date of Admission: 9/1/2024  5:37 PM   Date of Discharge: 9/2/24     Attending Provider: No att. providers found   Discharging Provider: Lona Khan NP   To contact this individual call [phone]  and ask the  to page.  If unavailable, ask to be transferred to Behavioral Health Provider on call.  A Behavioral Health Provider will be available on call 24/7 and during holidays.    Primary Care Provider: Johana Mo PA-C    Allergies   Allergen Reactions    Tizanidine Other (See Comments)       Reason for Admission: HISTORY OF PRESENTING COMPLAINT:  This is a 74 y.o. female who is currently admitted to the acute psychiatric floor at [facility] on a voluntary basis. The pt reports that she originally came to the hospital for help with her blood pressure. The pt has not been struggling with suicidal ideation, but has identified family stressors as a major trigger for worsening mental health, as she reports she's been disinherited and her children do not want a relationship with her. However, she is engaged in outpatient treatment, sees her psychiatrist twice a week and therapist once a week as well as peer support 3 times a week.      PAST PSYCHIATRIC HISTORY: Hx of bipolar disorder and PTSD. She does have a hx of inpatient psychiatric hospitalizations, most recently 6 years ago. The pt sees Marybeth Riddle NP for outpatient psychiatry with [facility]. She sees a therapist, Mana with [facility].     Admission Diagnosis: Bipolar disorder ([facility]) [F31.9]    * No surgery found *    Results for orders placed or performed during the hospital encounter of 09/01/24   Lipid Panel   Result Value Ref Range    Cholesterol, Total 217 (H) <200 MG/DL    Triglycerides 80 <150 MG/DL    HDL 97 MG/DL    LDL Cholesterol 104 (H) 0 - 100 MG/DL    VLDL Cholesterol Calculated

## 2024-09-04 ENCOUNTER — HOSPITAL ENCOUNTER (EMERGENCY)
Facility: HOSPITAL | Age: 74
Discharge: HOME | End: 2024-09-04
Attending: EMERGENCY MEDICINE
Payer: MEDICARE

## 2024-09-04 VITALS
OXYGEN SATURATION: 98 % | BODY MASS INDEX: 25.49 KG/M2 | DIASTOLIC BLOOD PRESSURE: 65 MMHG | HEIGHT: 61 IN | RESPIRATION RATE: 16 BRPM | SYSTOLIC BLOOD PRESSURE: 135 MMHG | WEIGHT: 135 LBS | TEMPERATURE: 98.4 F | HEART RATE: 60 BPM

## 2024-09-04 DIAGNOSIS — I10 HYPERTENSION, UNSPECIFIED TYPE: Primary | ICD-10-CM

## 2024-09-04 PROCEDURE — 99283 EMERGENCY DEPT VISIT LOW MDM: CPT

## 2024-09-04 RX ORDER — LOSARTAN POTASSIUM 50 MG/1
50 TABLET ORAL DAILY
Qty: 30 TABLET | Refills: 0 | Status: SHIPPED | OUTPATIENT
Start: 2024-09-04

## 2024-09-04 ASSESSMENT — ENCOUNTER SYMPTOMS
SORE THROAT: 0
COUGH: 0
VOMITING: 0

## 2024-09-04 ASSESSMENT — PAIN - FUNCTIONAL ASSESSMENT: PAIN_FUNCTIONAL_ASSESSMENT: NONE - DENIES PAIN

## 2024-09-04 NOTE — ED PROVIDER NOTES
vaccine series completed     Deep vein thrombosis (HCC) For years    Leg hurt    Fibromyalgia     Hemorrhoids     Hepatitis C virus infection cured after antiviral drug therapy     History of bleeding peptic ulcer 2016 & 2017    History of smoking     Hypertension     Long history    Psoriasis 2/10/2022    PTSD (post-traumatic stress disorder)     Seizures (HCC) 2024    Fears  falling from bed    Stress     Trauma Forever    Type 2 diabetes mellitus without complication (HCC) Jan2024    Varicosities     For years         SURGICAL HISTORY       Past Surgical History:   Procedure Laterality Date    BREAST REDUCTION SURGERY  08/30/2020    CHOLECYSTECTOMY      COLONOSCOPY      COLONOSCOPY N/A 1/15/2024    COLONOSCOPY performed by John Mirza MD at [facility] ENDOSCOPY    GASTRIC BYPASS SURGERY           CURRENT MEDICATIONS       Current Discharge Medication List        CONTINUE these medications which have NOT CHANGED    Details   divalproex (DEPAKOTE) 250 MG DR tablet 1 tablet 2 times daily      QUEtiapine (SEROQUEL) 50 MG tablet Take 1 tablet by mouth daily      QUEtiapine (SEROQUEL XR) 50 MG extended release tablet Take 2 tablets by mouth daily             ALLERGIES     Tizanidine    FAMILY HISTORY       Family History   Problem Relation Age of Onset    No Known Problems Mother     Heart Surgery Father     Hypertension Father     Anesth Problems Neg Hx           SOCIAL HISTORY       Social History     Socioeconomic History    Marital status: Single   Tobacco Use    Smoking status: Former     Current packs/day: 0.00     Average packs/day: 0.3 packs/day for 15.0 years (3.8 ttl pk-yrs)     Types: Cigarettes     Quit date: 8/1/2021     Years since quitting: 3.0    Smokeless tobacco: Never   Substance and Sexual Activity    Alcohol use: No    Drug use: Not Currently     Types: Marijuana (Weed)    Sexual activity: Not Currently     Comment: None     Social Determinants of Health     Food Insecurity: No Food Insecurity

## 2024-09-04 NOTE — ED TRIAGE NOTES
Pt BIBEMS for CC of high blood pressure.     EMS reports pt's pressure was in the 160's systolic.     Pt's BP in ED is 135/65    Pt is asymptomatic and requesting a medication refill.

## 2024-09-04 NOTE — ED NOTES
I have reviewed discharge instructions with the patient.  Opportunity for questions and clarification was provided.  The patient verbalized understanding.  Patient discharged out of the ED via ambulatory with no difficulty and in stable condition.

## 2024-09-06 LAB
., LABCORP: NORMAL
CYTOLOGIST CVX/VAG CYTO: NORMAL
CYTOLOGY CVX/VAG DOC CYTO: NORMAL
CYTOLOGY CVX/VAG DOC THIN PREP: NORMAL
DX ICD CODE: NORMAL
Lab: NORMAL
Lab: NORMAL
OTHER STN SPEC: NORMAL
STAT OF ADQ CVX/VAG CYTO-IMP: NORMAL

## 2024-09-13 ENCOUNTER — HOSPITAL ENCOUNTER (EMERGENCY)
Facility: HOSPITAL | Age: 74
Discharge: HOME | End: 2024-09-13
Attending: STUDENT IN AN ORGANIZED HEALTH CARE EDUCATION/TRAINING PROGRAM
Payer: MEDICARE

## 2024-09-13 ENCOUNTER — TELEPHONE (OUTPATIENT)
Facility: CLINIC | Age: 74
End: 2024-09-13

## 2024-09-13 VITALS
TEMPERATURE: 98.1 F | BODY MASS INDEX: 24.55 KG/M2 | DIASTOLIC BLOOD PRESSURE: 82 MMHG | RESPIRATION RATE: 12 BRPM | WEIGHT: 130 LBS | SYSTOLIC BLOOD PRESSURE: 151 MMHG | HEIGHT: 61 IN | HEART RATE: 76 BPM | OXYGEN SATURATION: 99 %

## 2024-09-13 DIAGNOSIS — G62.9 NEUROPATHY: Primary | ICD-10-CM

## 2024-09-13 DIAGNOSIS — F41.1 ANXIETY STATE: ICD-10-CM

## 2024-09-13 DIAGNOSIS — M25.531 RIGHT WRIST PAIN: ICD-10-CM

## 2024-09-13 LAB
ALBUMIN SERPL-MCNC: 4 G/DL (ref 3.5–5)
ALBUMIN/GLOB SERPL: 0.9 (ref 1.1–2.2)
ALP SERPL-CCNC: 78 U/L (ref 45–117)
ALT SERPL-CCNC: 30 U/L (ref 12–78)
ANION GAP SERPL CALC-SCNC: 6 MMOL/L (ref 2–12)
APPEARANCE UR: CLEAR
AST SERPL-CCNC: 43 U/L (ref 15–37)
BACTERIA URNS QL MICRO: NEGATIVE /HPF
BASOPHILS # BLD: 0 K/UL (ref 0–0.1)
BASOPHILS NFR BLD: 1 % (ref 0–1)
BILIRUB SERPL-MCNC: 0.5 MG/DL (ref 0.2–1)
BILIRUB UR QL: NEGATIVE
BUN SERPL-MCNC: 14 MG/DL (ref 6–20)
BUN/CREAT SERPL: 19 (ref 12–20)
CALCIUM SERPL-MCNC: 9.4 MG/DL (ref 8.5–10.1)
CHLORIDE SERPL-SCNC: 105 MMOL/L (ref 97–108)
CO2 SERPL-SCNC: 25 MMOL/L (ref 21–32)
COLOR UR: ABNORMAL
CREAT SERPL-MCNC: 0.74 MG/DL (ref 0.55–1.02)
DIFFERENTIAL METHOD BLD: ABNORMAL
EOSINOPHIL # BLD: 0.1 K/UL (ref 0–0.4)
EOSINOPHIL NFR BLD: 3 % (ref 0–7)
EPITH CASTS URNS QL MICRO: ABNORMAL /LPF
ERYTHROCYTE [DISTWIDTH] IN BLOOD BY AUTOMATED COUNT: 13.4 % (ref 11.5–14.5)
GLOBULIN SER CALC-MCNC: 4.3 G/DL (ref 2–4)
GLUCOSE SERPL-MCNC: 104 MG/DL (ref 65–100)
GLUCOSE UR STRIP.AUTO-MCNC: NEGATIVE MG/DL
HCT VFR BLD AUTO: 38.9 % (ref 35–47)
HGB BLD-MCNC: 13.1 G/DL (ref 11.5–16)
HGB UR QL STRIP: NEGATIVE
HYALINE CASTS URNS QL MICRO: ABNORMAL /LPF (ref 0–2)
IMM GRANULOCYTES # BLD AUTO: 0 K/UL (ref 0–0.04)
IMM GRANULOCYTES NFR BLD AUTO: 0 % (ref 0–0.5)
KETONES UR QL STRIP.AUTO: NEGATIVE MG/DL
LEUKOCYTE ESTERASE UR QL STRIP.AUTO: ABNORMAL
LYMPHOCYTES # BLD: 1.9 K/UL (ref 0.8–3.5)
LYMPHOCYTES NFR BLD: 50 % (ref 12–49)
MCH RBC QN AUTO: 30 PG (ref 26–34)
MCHC RBC AUTO-ENTMCNC: 33.7 G/DL (ref 30–36.5)
MCV RBC AUTO: 89 FL (ref 80–99)
MONOCYTES # BLD: 0.3 K/UL (ref 0–1)
MONOCYTES NFR BLD: 9 % (ref 5–13)
NEUTS SEG # BLD: 1.4 K/UL (ref 1.8–8)
NEUTS SEG NFR BLD: 37 % (ref 32–75)
NITRITE UR QL STRIP.AUTO: NEGATIVE
NRBC # BLD: 0 K/UL (ref 0–0.01)
NRBC BLD-RTO: 0 PER 100 WBC
PH UR STRIP: 6.5 (ref 5–8)
PLATELET # BLD AUTO: 232 K/UL (ref 150–400)
PMV BLD AUTO: 9.7 FL (ref 8.9–12.9)
POTASSIUM SERPL-SCNC: 4.4 MMOL/L (ref 3.5–5.1)
PROT SERPL-MCNC: 8.3 G/DL (ref 6.4–8.2)
PROT UR STRIP-MCNC: NEGATIVE MG/DL
RBC # BLD AUTO: 4.37 M/UL (ref 3.8–5.2)
RBC #/AREA URNS HPF: ABNORMAL /HPF (ref 0–5)
SODIUM SERPL-SCNC: 136 MMOL/L (ref 136–145)
SP GR UR REFRACTOMETRY: 1.01 (ref 1–1.03)
URINE CULTURE IF INDICATED: ABNORMAL
UROBILINOGEN UR QL STRIP.AUTO: 0.2 EU/DL (ref 0.2–1)
WBC # BLD AUTO: 3.8 K/UL (ref 3.6–11)
WBC URNS QL MICRO: ABNORMAL /HPF (ref 0–4)

## 2024-09-13 PROCEDURE — 81001 URINALYSIS AUTO W/SCOPE: CPT

## 2024-09-13 PROCEDURE — 80053 COMPREHEN METABOLIC PANEL: CPT

## 2024-09-13 PROCEDURE — 85025 COMPLETE CBC W/AUTO DIFF WBC: CPT

## 2024-09-13 PROCEDURE — 99283 EMERGENCY DEPT VISIT LOW MDM: CPT

## 2024-09-13 PROCEDURE — 36415 COLL VENOUS BLD VENIPUNCTURE: CPT

## 2024-09-13 ASSESSMENT — PAIN - FUNCTIONAL ASSESSMENT: PAIN_FUNCTIONAL_ASSESSMENT: 0-10

## 2024-09-13 ASSESSMENT — PAIN SCALES - GENERAL: PAINLEVEL_OUTOF10: 6

## 2024-09-13 ASSESSMENT — PAIN DESCRIPTION - LOCATION: LOCATION: ARM

## 2024-09-15 NOTE — ED PROVIDER NOTES
Skin:  Negative for color change, pallor, rash and wound.   Neurological:  Negative for speech difficulty and headaches.   Hematological:  Does not bruise/bleed easily.   Psychiatric/Behavioral:  Positive for decreased concentration and dysphoric mood. Negative for suicidal ideas. The patient is nervous/anxious and is hyperactive.            PAST MEDICAL HISTORY     Past Medical History:   Diagnosis Date    Anxiety     Bipolar 2 disorder (HCC) 09/2018    Blurred vision     COVID-19 vaccine series completed     Deep vein thrombosis (HCC) For years    Leg hurt    Fibromyalgia     Hemorrhoids     Hepatitis C virus infection cured after antiviral drug therapy     History of bleeding peptic ulcer 2016 & 2017    History of smoking     Hypertension     Long history    Psoriasis 2/10/2022    PTSD (post-traumatic stress disorder)     Seizures (HCC) 2024    Fears  falling from bed    Stress     Trauma Forever    Type 2 diabetes mellitus without complication (HCC) Jan2024    Varicosities     For years         SURGICAL HISTORY       Past Surgical History:   Procedure Laterality Date    BREAST REDUCTION SURGERY  08/30/2020    CHOLECYSTECTOMY      COLONOSCOPY      COLONOSCOPY N/A 1/15/2024    COLONOSCOPY performed by John [facility]za MD at [facility] ENDOSCOPY    GASTRIC BYPASS SURGERY           CURRENT MEDICATIONS       Discharge Medication List as of 9/1/2024  4:26 PM        CONTINUE these medications which have NOT CHANGED    Details   amLODIPine (NORVASC) 10 MG tablet Take 1 tablet by mouth daily, Disp-90 tablet, R-0Normal      metoprolol succinate (TOPROL XL) 200 MG extended release tablet Take 1 tablet by mouth daily, Disp-90 tablet, R-0Normal      methocarbamol (ROBAXIN) 500 MG tablet TAKE ONE TABLET BY MOUTH FOUR TIMES A DAY, Disp-40 tablet, R-1Normal      gabapentin (NEURONTIN) 800 MG tablet TAKE 1 TABLET BY MOUTH 4 TIMES DAILY MAX DAILY AMOUNT: 3,200 MG, Disp-360 tablet, R-0Normal      pantoprazole (PROTONIX) 40 MG tablet

## 2024-09-16 ASSESSMENT — ENCOUNTER SYMPTOMS
COLOR CHANGE: 0
NAUSEA: 0
SINUS PRESSURE: 0
EYE REDNESS: 0
ABDOMINAL DISTENTION: 0
COUGH: 0
SINUS PAIN: 0
VOMITING: 0
SHORTNESS OF BREATH: 0
ABDOMINAL PAIN: 0
EYE PAIN: 0

## 2024-09-20 LAB
ALBUMIN SERPL-MCNC: 3.7 G/DL (ref 3.5–5)
ALBUMIN/GLOB SERPL: 1.2 (ref 1.1–2.2)
ALP SERPL-CCNC: 71 U/L (ref 45–117)
ALT SERPL-CCNC: 27 U/L (ref 12–78)
ANION GAP SERPL CALC-SCNC: 2 MMOL/L (ref 2–12)
AST SERPL-CCNC: 33 U/L (ref 15–37)
BILIRUB SERPL-MCNC: 0.4 MG/DL (ref 0.2–1)
BUN SERPL-MCNC: 12 MG/DL (ref 6–20)
BUN/CREAT SERPL: 21 (ref 12–20)
CALCIUM SERPL-MCNC: 9.3 MG/DL (ref 8.5–10.1)
CHLORIDE SERPL-SCNC: 100 MMOL/L (ref 97–108)
CHOLEST SERPL-MCNC: 206 MG/DL
CO2 SERPL-SCNC: 30 MMOL/L (ref 21–32)
CREAT SERPL-MCNC: 0.58 MG/DL (ref 0.55–1.02)
EST. AVERAGE GLUCOSE BLD GHB EST-MCNC: 108 MG/DL
GLOBULIN SER CALC-MCNC: 3.1 G/DL (ref 2–4)
GLUCOSE SERPL-MCNC: 102 MG/DL (ref 65–100)
HBA1C MFR BLD: 5.4 % (ref 4–5.6)
HDLC SERPL-MCNC: 97 MG/DL
HDLC SERPL: 2.1 (ref 0–5)
LDLC SERPL CALC-MCNC: 94.8 MG/DL (ref 0–100)
POTASSIUM SERPL-SCNC: 5 MMOL/L (ref 3.5–5.1)
PROT SERPL-MCNC: 6.8 G/DL (ref 6.4–8.2)
SODIUM SERPL-SCNC: 132 MMOL/L (ref 136–145)
TRIGL SERPL-MCNC: 71 MG/DL
VLDLC SERPL CALC-MCNC: 14.2 MG/DL

## 2024-10-17 ENCOUNTER — HOSPITAL ENCOUNTER (OUTPATIENT)
Facility: HOSPITAL | Age: 74
Discharge: HOME | End: 2024-10-20
Payer: COMMERCIAL

## 2024-10-17 DIAGNOSIS — M81.0 AGE-RELATED OSTEOPOROSIS WITHOUT CURRENT PATHOLOGICAL FRACTURE: ICD-10-CM

## 2024-10-17 PROCEDURE — 77080 DXA BONE DENSITY AXIAL: CPT

## 2024-12-09 RX ORDER — PANTOPRAZOLE SODIUM 40 MG/1
40 TABLET, DELAYED RELEASE ORAL
Qty: 60 TABLET | Refills: 4 | Status: SHIPPED | OUTPATIENT
Start: 2024-12-09

## 2025-01-10 ENCOUNTER — TELEPHONE (OUTPATIENT)
Age: 75
End: 2025-01-10

## 2025-01-10 NOTE — TELEPHONE ENCOUNTER
Gaurang,    Your strep culture returned negative for infection. Continue with the supportive cares we discussed at your appointment.     Have a great day,  Dylon Orozco PA-C on 2/18/2020 at 11:03 AM   I called and spoke with the patient. I told her I see that she has not been seen since 12/2023. I asked why she is asking for a script for Dicyclomine? She stated, \"I have IBS. I am taking Linzess as needed. I am experiencing abdominal pain today and it usually happens once a week. It really has me down today. I reached out to my NP to request a refill but have not heard back.\" I told her I will route this to him but I am not sure if he will fill it since you have not been seen in over a year. She asked about scheduling a virtual appointment to see him and I told her she can, just call the office. I told her I will route the call to Dr. Bonilla. She said, \"I do currently have some of the medication on hand.\" She told me she will call the office and schedule an appointment. I told her to let them know you want to schedule a virtual visit. She acknowledged understanding and thanked me for the call.

## 2025-01-14 ENCOUNTER — TELEMEDICINE (OUTPATIENT)
Age: 75
End: 2025-01-14

## 2025-01-14 VITALS — BODY MASS INDEX: 28.32 KG/M2 | HEIGHT: 61 IN | WEIGHT: 150 LBS

## 2025-01-14 DIAGNOSIS — K91.2 POSTSURGICAL MALABSORPTION: Primary | ICD-10-CM

## 2025-01-14 RX ORDER — GABAPENTIN 800 MG/1
800 TABLET ORAL 4 TIMES DAILY
COMMUNITY
Start: 2024-12-17

## 2025-01-14 RX ORDER — DICYCLOMINE HCL 20 MG
20 TABLET ORAL 4 TIMES DAILY PRN
Qty: 40 TABLET | Refills: 2 | Status: SHIPPED | OUTPATIENT
Start: 2025-01-14

## 2025-01-14 RX ORDER — MIRTAZAPINE 15 MG/1
7.5 TABLET, FILM COATED ORAL NIGHTLY
COMMUNITY
Start: 2024-12-27

## 2025-01-14 RX ORDER — METOPROLOL SUCCINATE 200 MG/1
200 TABLET, EXTENDED RELEASE ORAL DAILY
COMMUNITY
Start: 2024-12-17

## 2025-01-14 RX ORDER — AMLODIPINE BESYLATE 2.5 MG/1
2.5 TABLET ORAL 2 TIMES DAILY
COMMUNITY
Start: 2025-01-06

## 2025-01-14 RX ORDER — ESCITALOPRAM OXALATE 10 MG/1
10 TABLET ORAL DAILY
COMMUNITY
Start: 2025-01-08

## 2025-01-14 ASSESSMENT — PATIENT HEALTH QUESTIONNAIRE - PHQ9
SUM OF ALL RESPONSES TO PHQ QUESTIONS 1-9: 0
1. LITTLE INTEREST OR PLEASURE IN DOING THINGS: NOT AT ALL
2. FEELING DOWN, DEPRESSED OR HOPELESS: NOT AT ALL
SUM OF ALL RESPONSES TO PHQ QUESTIONS 1-9: 0
SUM OF ALL RESPONSES TO PHQ9 QUESTIONS 1 & 2: 0
SUM OF ALL RESPONSES TO PHQ QUESTIONS 1-9: 0
SUM OF ALL RESPONSES TO PHQ QUESTIONS 1-9: 0

## 2025-01-14 ASSESSMENT — PAIN SCALES - GENERAL: PAINLEVEL_OUTOF10: 4

## 2025-01-14 ASSESSMENT — PAIN DESCRIPTION - LOCATION: LOCATION: GENERALIZED

## 2025-01-14 NOTE — PROGRESS NOTES
Elizabeth Chinchilla, was evaluated through a synchronous (real-time) audio-video encounter. The patient (or guardian if applicable) is aware that this is a billable service, which includes applicable co-pays. This Virtual Visit was conducted with patient's (and/or legal guardian's) consent. Patient identification was verified, and a caregiver was present when appropriate.   The patient was located at Home: [address]  [address]  [place] [address]  Provider was located at Facility (Appt Dept): [address]  [address]  [place] [address]  Confirm you are appropriately licensed, registered, or certified to deliver care in the state where the patient is located as indicated above. If you are not or unsure, please re-schedule the visit: Yes, I confirm.        Total time spent for this encounter:  8 minutes    --Javier Bonilla MD on 1/14/2025 at 9:26 AM    An electronic signature was used to authenticate this note.     Subjective:       Elizabeth Chinchilla presents 1 year following evaluation of abdominal pain; she has a history of gastric bypass with perforated marginal ulcer 5 years after her bariatric procedure related to tobacco abuse.  Since her last office visit, she has stopped smoking but continues to vape.  She underwent an endoscopy which did not not reveal any active ulcers.  She has gained some weight.  Crampy abdominal pain is rare, improved with Bentyl.  She denies hematemesis or melena.  Recent colonoscopy without significant findings.     Objective:      Ht 1.549 m (5' 1\")   Wt 68 kg (150 lb)   BMI 28.34 kg/m²     General:  alert, appears older than stated age, and cooperative   Abdomen: deferred   Incision:   deferred       Assessment:      Marginal ulcer status post gastric bypass; most recent endoscopy without persistent ulcer.      Plan:     1. Continue current medications.  Refill for Bentyl placed.  2.  High-protein bariatric diet.  3.  Nutritional labs added to PCP labs due to be

## 2025-01-14 NOTE — PROGRESS NOTES
Identified patient with two patient identifiers (name and ). Reviewed chart in preparation for visit and have obtained necessary documentation.    Elizabeth Chinchilla is a 74 y.o. female  Chief Complaint   Patient presents with    Follow-up     13 YEARS S/P LAPAROSCOPIC GASTRIC BYPASS    Medication Refill     Ht 1.549 m (5' 1\")   Wt 68 kg (150 lb)   BMI 28.34 kg/m²     1. Have you been to the ER, urgent care clinic since your last visit?  Hospitalized since your last visit?no    2. Have you seen or consulted any other health care providers outside of the [facility] since your last visit?  Include any pap smears or colon screening. no

## 2025-03-10 ENCOUNTER — TELEPHONE (OUTPATIENT)
Age: 75
End: 2025-03-10

## 2025-03-10 RX ORDER — ONDANSETRON 4 MG/1
4 TABLET, FILM COATED ORAL 3 TIMES DAILY PRN
Qty: 15 TABLET | Refills: 0 | Status: SHIPPED | OUTPATIENT
Start: 2025-03-10

## 2025-03-10 NOTE — TELEPHONE ENCOUNTER
I called back and she is requesting a refill on her Zofran, she said she has nausea but no vomiting. She then said she wanted an appointment to see Dr Bonilla, she said she has some abdominal pain and is not sure if it is gas or something else. She is passing gas, and moving her bowels, she then said maybe her pain is the gas, she then said she has had this pain for sometime and needs to find out the cause and wants to be seen by Dr Bonilla. I transferred her to the  to make her appointment and I told her I will forward this call to Dr Bonilla so he can prescribe the Zofran she is requesting, Pt in agreement.

## 2025-03-10 NOTE — TELEPHONE ENCOUNTER
Patient states she is having some severe nausea and is requesting some medication. She is also wondering if she should have a cat scan to check for new hernias. Patient is a gastric bypass patient.

## 2025-03-12 ENCOUNTER — TELEMEDICINE (OUTPATIENT)
Age: 75
End: 2025-03-12
Payer: MEDICARE

## 2025-03-12 ENCOUNTER — TELEPHONE (OUTPATIENT)
Age: 75
End: 2025-03-12

## 2025-03-12 VITALS
WEIGHT: 155 LBS | DIASTOLIC BLOOD PRESSURE: 74 MMHG | SYSTOLIC BLOOD PRESSURE: 137 MMHG | BODY MASS INDEX: 29.27 KG/M2 | HEIGHT: 61 IN

## 2025-03-12 DIAGNOSIS — R10.31 RIGHT LOWER QUADRANT ABDOMINAL PAIN: Primary | ICD-10-CM

## 2025-03-12 PROCEDURE — 3075F SYST BP GE 130 - 139MM HG: CPT | Performed by: NURSE PRACTITIONER

## 2025-03-12 PROCEDURE — 1159F MED LIST DOCD IN RCRD: CPT | Performed by: NURSE PRACTITIONER

## 2025-03-12 PROCEDURE — 99212 OFFICE O/P EST SF 10 MIN: CPT | Performed by: NURSE PRACTITIONER

## 2025-03-12 PROCEDURE — 3078F DIAST BP <80 MM HG: CPT | Performed by: NURSE PRACTITIONER

## 2025-03-12 PROCEDURE — 1123F ACP DISCUSS/DSCN MKR DOCD: CPT | Performed by: NURSE PRACTITIONER

## 2025-03-12 ASSESSMENT — PATIENT HEALTH QUESTIONNAIRE - PHQ9
SUM OF ALL RESPONSES TO PHQ QUESTIONS 1-9: 2
SUM OF ALL RESPONSES TO PHQ QUESTIONS 1-9: 2
5. POOR APPETITE OR OVEREATING: NEARLY EVERY DAY
10. IF YOU CHECKED OFF ANY PROBLEMS, HOW DIFFICULT HAVE THESE PROBLEMS MADE IT FOR YOU TO DO YOUR WORK, TAKE CARE OF THINGS AT HOME, OR GET ALONG WITH OTHER PEOPLE: EXTREMELY DIFFICULT
7. TROUBLE CONCENTRATING ON THINGS, SUCH AS READING THE NEWSPAPER OR WATCHING TELEVISION: NOT AT ALL
SUM OF ALL RESPONSES TO PHQ QUESTIONS 1-9: 12
SUM OF ALL RESPONSES TO PHQ QUESTIONS 1-9: 2
4. FEELING TIRED OR HAVING LITTLE ENERGY: NOT AT ALL
6. FEELING BAD ABOUT YOURSELF - OR THAT YOU ARE A FAILURE OR HAVE LET YOURSELF OR YOUR FAMILY DOWN: NEARLY EVERY DAY
SUM OF ALL RESPONSES TO PHQ QUESTIONS 1-9: 12
2. FEELING DOWN, DEPRESSED OR HOPELESS: SEVERAL DAYS
9. THOUGHTS THAT YOU WOULD BE BETTER OFF DEAD, OR OF HURTING YOURSELF: NOT AT ALL
1. LITTLE INTEREST OR PLEASURE IN DOING THINGS: NEARLY EVERY DAY
SUM OF ALL RESPONSES TO PHQ QUESTIONS 1-9: 2
3. TROUBLE FALLING OR STAYING ASLEEP: NOT AT ALL
2. FEELING DOWN, DEPRESSED OR HOPELESS: NEARLY EVERY DAY
SUM OF ALL RESPONSES TO PHQ QUESTIONS 1-9: 12
8. MOVING OR SPEAKING SO SLOWLY THAT OTHER PEOPLE COULD HAVE NOTICED. OR THE OPPOSITE, BEING SO FIGETY OR RESTLESS THAT YOU HAVE BEEN MOVING AROUND A LOT MORE THAN USUAL: NOT AT ALL
1. LITTLE INTEREST OR PLEASURE IN DOING THINGS: SEVERAL DAYS
SUM OF ALL RESPONSES TO PHQ QUESTIONS 1-9: 12

## 2025-03-12 NOTE — PROGRESS NOTES
Identified pt with two pt identifiers (name and ). Reviewed chart in preparation for visit and have obtained necessary documentation.     Elizabeth Chinchilla is a 74 y.o. female  No chief complaint on file.    /74   Ht 1.549 m (5' 1\")   Wt 70.3 kg (155 lb)   BMI 29.29 kg/m²      1. Have you been to the ER, urgent care clinic since your last visit?  Hospitalized since your last visit?no     2. Have you seen or consulted any other health care providers outside of the [facility] since your last visit?  Include any pap smears or colon screening. no

## 2025-03-12 NOTE — TELEPHONE ENCOUNTER
Attempted to contact patient to confirm appt today with Gerri Murillo NP. Left message.     Patient spoke with nurse on 3/10 and requested an appt with Dr. Bonilla.     If patient calls back, please let her know she is seeing Gerri today and if she wants to see Dr. Bonilla instead, please reschedule to next available.

## 2025-03-18 ENCOUNTER — TELEPHONE (OUTPATIENT)
Age: 75
End: 2025-03-18

## 2025-03-18 NOTE — TELEPHONE ENCOUNTER
Pt wants to know if she can get something for her anxiety for her upcoming CT Scan since she needs an IV and she's afraid of needles

## 2025-03-18 NOTE — TELEPHONE ENCOUNTER
Returned call to patient regarding previous phone encounter.  Two patient identifiers used. Pt expressed concern about IV needles that would be used during scheduled CT appt 4/4/25 stated she is \"not real happy about needles\". Pt requested a medication to calm her, stated probably only needs one tablet. Confirmed pt's preferred pharmacy is [facility].    Informed pt request will be forwarded to provider and will follow up. Pt verbalized understanding and thanked for the call.

## 2025-03-18 NOTE — TELEPHONE ENCOUNTER
Called pt and informed that per NP request will be discussed w/ Dr. Bonilla next week when he is in office. Pt verbalized understanding and thanked for the call.

## 2025-03-26 ENCOUNTER — TELEPHONE (OUTPATIENT)
Age: 75
End: 2025-03-26

## 2025-03-26 DIAGNOSIS — R10.31 RIGHT LOWER QUADRANT ABDOMINAL PAIN: ICD-10-CM

## 2025-03-26 RX ORDER — LORAZEPAM 0.5 MG/1
0.5 TABLET ORAL ONCE
Qty: 1 TABLET | Refills: 0 | Status: SHIPPED | OUTPATIENT
Start: 2025-03-26 | End: 2025-03-26

## 2025-03-26 NOTE — TELEPHONE ENCOUNTER
Called and LVM to pharmacy for verbal confirmation prescription for ativan has been received by them.

## 2025-03-26 NOTE — PROGRESS NOTES
Sent to [facility].   Will cancel [facility] prescription.

## 2025-04-21 RX ORDER — ONDANSETRON 4 MG/1
TABLET, FILM COATED ORAL
Qty: 15 TABLET | Refills: 0 | Status: SHIPPED | OUTPATIENT
Start: 2025-04-21

## 2025-06-16 ENCOUNTER — TELEPHONE (OUTPATIENT)
Age: 75
End: 2025-06-16

## 2025-06-16 NOTE — TELEPHONE ENCOUNTER
Patient called back to request a refill on her pantoprazole sodium 40 mg and check on the status of her dicyclomine 20 mg. Patient requested prescriptions be sent to [facility]. Advised nurse would return call.

## 2025-06-16 NOTE — TELEPHONE ENCOUNTER
Patient identified with two patient identifiers.  Patient confirmed messages received.  Patient asked if she had every completed CT ordered in march. Patient states she has not, \"maybe I will do it next month\".  Patient ask what are her current symptoms.  Per patient achy gut, bitter taste in mouth, no appetite, and forcing her self to drink fluids such as water, protein shake, and bone broth.  No current C/O nausea and vomiting.  Patient states bone broth is the only thing that soothe her something. Patient requesting refill on dicyclomine 20 mg and Pantoprazole 40 mg. Patient informed I will message provider for review and follow up.  Patient expressed understanding.

## 2025-06-16 NOTE — TELEPHONE ENCOUNTER
Patient returned call about prescriptions advised she will more than likely receive a call following day.

## 2025-06-17 RX ORDER — DICYCLOMINE HCL 20 MG
20 TABLET ORAL 4 TIMES DAILY PRN
Qty: 40 TABLET | Refills: 2 | Status: SHIPPED | OUTPATIENT
Start: 2025-06-17

## 2025-06-17 RX ORDER — PANTOPRAZOLE SODIUM 40 MG/1
40 TABLET, DELAYED RELEASE ORAL 2 TIMES DAILY
Qty: 60 TABLET | Refills: 1 | Status: SHIPPED | OUTPATIENT
Start: 2025-06-17

## 2025-06-17 NOTE — TELEPHONE ENCOUNTER
Left message for patient to return call.    Refills request completed by provider.  Patient needs to schedule CT and or office follow up, prior to any further refills.      Patient returned call identified with two patient identifiers.  Patient informed of information above. She expressed understanding states she will have her aide help with scheduling test today and follow up to scheduled office visit.  She agreed to return call if any other questions or concerns.

## 2025-06-24 ENCOUNTER — OFFICE VISIT (OUTPATIENT)
Age: 75
End: 2025-06-24
Payer: MEDICARE

## 2025-06-24 VITALS
RESPIRATION RATE: 20 BRPM | HEART RATE: 55 BPM | HEIGHT: 61 IN | DIASTOLIC BLOOD PRESSURE: 80 MMHG | SYSTOLIC BLOOD PRESSURE: 136 MMHG | TEMPERATURE: 98 F | OXYGEN SATURATION: 94 % | BODY MASS INDEX: 28.89 KG/M2 | WEIGHT: 153 LBS

## 2025-06-24 DIAGNOSIS — Z98.84 STATUS FOLLOWING GASTRIC BYPASS FOR WEIGHT LOSS: ICD-10-CM

## 2025-06-24 DIAGNOSIS — R11.0 NAUSEA: Primary | ICD-10-CM

## 2025-06-24 PROCEDURE — 1125F AMNT PAIN NOTED PAIN PRSNT: CPT | Performed by: SURGERY

## 2025-06-24 PROCEDURE — 99213 OFFICE O/P EST LOW 20 MIN: CPT | Performed by: SURGERY

## 2025-06-24 PROCEDURE — 3075F SYST BP GE 130 - 139MM HG: CPT | Performed by: SURGERY

## 2025-06-24 PROCEDURE — 3079F DIAST BP 80-89 MM HG: CPT | Performed by: SURGERY

## 2025-06-24 PROCEDURE — 1123F ACP DISCUSS/DSCN MKR DOCD: CPT | Performed by: SURGERY

## 2025-06-24 RX ORDER — SUCRALFATE 1 G/1
1 TABLET ORAL 4 TIMES DAILY
Qty: 120 TABLET | Refills: 3 | Status: SHIPPED | OUTPATIENT
Start: 2025-06-24

## 2025-06-24 RX ORDER — ONDANSETRON 4 MG/1
4 TABLET, FILM COATED ORAL EVERY 8 HOURS PRN
Qty: 15 TABLET | Refills: 1 | Status: SHIPPED | OUTPATIENT
Start: 2025-06-24

## 2025-06-24 RX ORDER — QUETIAPINE FUMARATE 50 MG/1
50 TABLET, EXTENDED RELEASE ORAL NIGHTLY
COMMUNITY

## 2025-06-24 ASSESSMENT — PATIENT HEALTH QUESTIONNAIRE - PHQ9
SUM OF ALL RESPONSES TO PHQ QUESTIONS 1-9: 2
SUM OF ALL RESPONSES TO PHQ QUESTIONS 1-9: 2
2. FEELING DOWN, DEPRESSED OR HOPELESS: SEVERAL DAYS
1. LITTLE INTEREST OR PLEASURE IN DOING THINGS: SEVERAL DAYS
SUM OF ALL RESPONSES TO PHQ QUESTIONS 1-9: 2
SUM OF ALL RESPONSES TO PHQ QUESTIONS 1-9: 2

## 2025-06-30 ENCOUNTER — TELEPHONE (OUTPATIENT)
Age: 75
End: 2025-06-30

## 2025-06-30 RX ORDER — SUCRALFATE ORAL 1 G/10ML
1 SUSPENSION ORAL 4 TIMES DAILY
Qty: 1200 ML | Refills: 1 | Status: SHIPPED | OUTPATIENT
Start: 2025-06-30

## 2025-06-30 RX ORDER — IOPAMIDOL 755 MG/ML
100 INJECTION, SOLUTION INTRAVASCULAR
Status: DISCONTINUED | OUTPATIENT
Start: 2025-06-30 | End: 2025-07-05 | Stop reason: HOSPADM

## 2025-06-30 NOTE — TELEPHONE ENCOUNTER
I called the patient back and she wanted to know if the carafate could be switched to liquid form, I told her her insurance probably would not cover liquid form and she can get a shot glass with water and let the pill dissolve it would make a slurry and she could drink it. Pt said she is trying to stop smoking and it has been very stressful for her, she said she needs a lot of support but she has stopped smoking before and she can do it. Pt in agreement.

## 2025-06-30 NOTE — TELEPHONE ENCOUNTER
I called Elida back from [facility] and I told her I discussed with patient to dissolve the Carafate pill in water. I told her I did not refuse to do a prior auth as I did not have a prior auth to do. I told Elida I will ask provider to send in liquid form and if prior auth is needed we would obtain. Elida in agreement.

## 2025-06-30 NOTE — TELEPHONE ENCOUNTER
Elida at [facility] called and stated that patient was told we would not completed the PA for Carafate. Advised I would have nurse give her a call back. [phone]

## 2025-06-30 NOTE — TELEPHONE ENCOUNTER
Patient called to speak with nurse regarding her having Ct scan tomorrow and discuss medications. Patient requested liquid form of medication versus pill. (Sucralfate)

## 2025-07-01 ENCOUNTER — HOSPITAL ENCOUNTER (OUTPATIENT)
Facility: HOSPITAL | Age: 75
Discharge: HOME | End: 2025-07-04
Payer: MEDICARE

## 2025-07-01 DIAGNOSIS — R10.31 RIGHT LOWER QUADRANT ABDOMINAL PAIN: ICD-10-CM

## 2025-07-01 PROCEDURE — 82565 ASSAY OF CREATININE: CPT

## 2025-07-01 PROCEDURE — 74176 CT ABD & PELVIS W/O CONTRAST: CPT

## 2025-07-02 LAB — CREAT BLD-MCNC: 2.7 MG/DL (ref 0.6–1.3)

## 2025-07-08 ENCOUNTER — TELEMEDICINE (OUTPATIENT)
Age: 75
End: 2025-07-08

## 2025-07-08 DIAGNOSIS — R10.11 RUQ ABDOMINAL PAIN: Primary | ICD-10-CM

## 2025-07-08 ASSESSMENT — PATIENT HEALTH QUESTIONNAIRE - PHQ9
SUM OF ALL RESPONSES TO PHQ QUESTIONS 1-9: 1
SUM OF ALL RESPONSES TO PHQ QUESTIONS 1-9: 1
2. FEELING DOWN, DEPRESSED OR HOPELESS: SEVERAL DAYS
1. LITTLE INTEREST OR PLEASURE IN DOING THINGS: NOT AT ALL
SUM OF ALL RESPONSES TO PHQ QUESTIONS 1-9: 1
SUM OF ALL RESPONSES TO PHQ QUESTIONS 1-9: 1

## 2025-07-11 PROBLEM — R10.11 RUQ ABDOMINAL PAIN: Status: ACTIVE | Noted: 2025-07-11

## 2025-07-11 RX ORDER — ONDANSETRON 4 MG/1
4 TABLET, FILM COATED ORAL EVERY 8 HOURS PRN
Qty: 15 TABLET | Refills: 1 | Status: SHIPPED | OUTPATIENT
Start: 2025-07-11

## 2025-07-22 RX ORDER — ONDANSETRON 4 MG/1
4 TABLET, FILM COATED ORAL EVERY 8 HOURS PRN
Qty: 15 TABLET | Refills: 1 | Status: SHIPPED | OUTPATIENT
Start: 2025-07-22

## 2025-07-30 RX ORDER — PANTOPRAZOLE SODIUM 40 MG/1
TABLET, DELAYED RELEASE ORAL
Qty: 60 TABLET | Refills: 1 | Status: SHIPPED | OUTPATIENT
Start: 2025-07-30

## (undated) DEVICE — GLOVE ORTHO 8   MSG9480

## (undated) DEVICE — KIT DRP FOR RIO ROBOTIC ARM ASST SYS

## (undated) DEVICE — SUTURE VCRL + SZ 1-0 L36IN ABSRB UD CTX 1/2 CIR TAPR PNT VCP977H

## (undated) DEVICE — BANDAGE COMPR W6INXL10YD ST M E WHITE/BEIGE

## (undated) DEVICE — TAPE,CLOTH/SILK,CURAD,3"X10YD,LF,40/CS: Brand: CURAD

## (undated) DEVICE — SYR LR LCK 1ML GRAD NSAF 30ML --

## (undated) DEVICE — ZIMMER® STERILE DISPOSABLE TOURNIQUET CUFF WITH PROTECTIVE SLEEVE AND PLC, DUAL PORT, SINGLE BLADDER, 34 IN. (86 CM)

## (undated) DEVICE — TOTAL JOINT-SFMC: Brand: MEDLINE INDUSTRIES, INC.

## (undated) DEVICE — KIT INT FIX FEM TIB CKPT MAKOPLASTY

## (undated) DEVICE — SUTURE MCRYL SZ 3-0 L27IN ABSRB UD L24MM PS-1 3/8 CIR PRIM Y936H

## (undated) DEVICE — PIN BNE 4X110MM STRL -- 2/PK MAKO

## (undated) DEVICE — SUTURE VCRL SZ 2-0 L36IN ABSRB UD L36MM CT-1 1/2 CIR J945H

## (undated) DEVICE — PAD NON-ADHERENT 3X4 STRL LF --

## (undated) DEVICE — 4-PORT MANIFOLD: Brand: NEPTUNE 2

## (undated) DEVICE — PIN BNE FIX 4X140MM STRL -- 1EA=2PK MAKO

## (undated) DEVICE — GLOVE SURG SZ 8 L12IN FNGR THK79MIL GRN LTX FREE

## (undated) DEVICE — DRAPE,EXTREMITY,89X128,STERILE: Brand: MEDLINE

## (undated) DEVICE — KIT TRK KNEE PROC VIZADISC

## (undated) DEVICE — HOOD: Brand: FLYTE

## (undated) DEVICE — SUTURE STRATAFIX SPRL MCRYL + SZ 3-0 L24IN ABSRB UD PS-2 SXMP1B108

## (undated) DEVICE — INTENDED FOR TISSUE SEPARATION, AND OTHER PROCEDURES THAT REQUIRE A SHARP SURGICAL BLADE TO PUNCTURE OR CUT.: Brand: BARD-PARKER ® CARBON RIB-BACK BLADES

## (undated) DEVICE — MAGNETIC INSTR DRAPE 20X16: Brand: MEDLINE INDUSTRIES, INC.

## (undated) DEVICE — GLOVE ORTHO 7 1/2   MSG9475

## (undated) DEVICE — 3M™ TEGADERM™ TRANSPARENT FILM DRESSING FRAME STYLE, 1624W, 2-3/8 IN X 2-3/4 IN (6 CM X 7 CM), 100/CT 4CT/CASE: Brand: 3M™ TEGADERM™

## (undated) DEVICE — 1010 S-DRAPE TOWEL DRAPE 10/BX: Brand: STERI-DRAPE™

## (undated) DEVICE — SUTURE STRATAFIX SPRL PDS + SZ 2-0 L9IN ABSRB VLT CT-1 SXPP1B456

## (undated) DEVICE — PENCIL SMK EVAC L10FT DIA95MM TBNG NONSTICK W ADPT TO 22MM

## (undated) DEVICE — PADDING CST 6IN STERILE --

## (undated) DEVICE — SUTURE STRATAFIX SPRL SZ 1 L14IN ABSRB VLT L48CM CTX 1/2 SXPD2B405

## (undated) DEVICE — SPONGE GZ W4XL4IN COT 12 PLY TYP VII WVN C FLD DSGN

## (undated) DEVICE — WRAP LEG DEMAYO STRL -- MAKO

## (undated) DEVICE — BLADE SURG SAW STD S STL OSC W/ SERR EDGE DISP

## (undated) DEVICE — STRYKER PERFORMANCE SERIES SAGITTAL BLADE: Brand: STRYKER PERFORMANCE SERIES

## (undated) DEVICE — PREP KIT PEEL PTCH POVIDONE IOD

## (undated) DEVICE — GLOVE SURG SZ 85 L12IN FNGR THK79MIL GRN LTX FREE

## (undated) DEVICE — SOLUTION IRRIG 3000ML 0.9% SOD CHL USP UROMATIC PLAS CONT

## (undated) DEVICE — CEMENT MIXING SYSTEM WITH FEMORAL BREAKWAY NOZZLE: Brand: REVOLUTION

## (undated) DEVICE — Device: Brand: JELCO

## (undated) DEVICE — DRESSING FOAM W4XL10IN AG SIL ADH ANTIMIC POSTOP OPTIFOAM

## (undated) DEVICE — COVER LT HNDL PLAS RIG 1 PER PK